# Patient Record
Sex: MALE | Race: WHITE | NOT HISPANIC OR LATINO | Employment: OTHER | URBAN - METROPOLITAN AREA
[De-identification: names, ages, dates, MRNs, and addresses within clinical notes are randomized per-mention and may not be internally consistent; named-entity substitution may affect disease eponyms.]

---

## 2020-02-17 ENCOUNTER — OFFICE VISIT - HEALTHEAST (OUTPATIENT)
Dept: CARDIOLOGY | Facility: CLINIC | Age: 65
End: 2020-02-17

## 2020-02-17 DIAGNOSIS — R07.9 ACUTE CHEST PAIN: ICD-10-CM

## 2020-02-17 ASSESSMENT — MIFFLIN-ST. JEOR: SCORE: 1693.45

## 2020-02-25 ENCOUNTER — HOSPITAL ENCOUNTER (OUTPATIENT)
Dept: CARDIOLOGY | Facility: CLINIC | Age: 65
Discharge: HOME | End: 2020-02-25
Attending: INTERNAL MEDICINE

## 2020-02-25 DIAGNOSIS — R07.9 ACUTE CHEST PAIN: ICD-10-CM

## 2020-02-25 LAB
CV STRESS CURRENT BP HE: NORMAL
CV STRESS CURRENT HR HE: 102
CV STRESS CURRENT HR HE: 109
CV STRESS CURRENT HR HE: 111
CV STRESS CURRENT HR HE: 113
CV STRESS CURRENT HR HE: 115
CV STRESS CURRENT HR HE: 115
CV STRESS CURRENT HR HE: 117
CV STRESS CURRENT HR HE: 117
CV STRESS CURRENT HR HE: 118
CV STRESS CURRENT HR HE: 119
CV STRESS CURRENT HR HE: 122
CV STRESS CURRENT HR HE: 124
CV STRESS CURRENT HR HE: 125
CV STRESS CURRENT HR HE: 125
CV STRESS CURRENT HR HE: 128
CV STRESS CURRENT HR HE: 130
CV STRESS CURRENT HR HE: 132
CV STRESS CURRENT HR HE: 133
CV STRESS CURRENT HR HE: 138
CV STRESS CURRENT HR HE: 145
CV STRESS CURRENT HR HE: 147
CV STRESS CURRENT HR HE: 157
CV STRESS CURRENT HR HE: 94
CV STRESS DEVIATION TIME HE: NORMAL
CV STRESS ECHO PERCENT HR HE: NORMAL
CV STRESS EXERCISE STAGE HE: NORMAL
CV STRESS FINAL RESTING BP HE: NORMAL
CV STRESS FINAL RESTING HR HE: 113
CV STRESS MAX HR HE: 158
CV STRESS MAX TREADMILL GRADE HE: 14
CV STRESS MAX TREADMILL SPEED HE: 3.4
CV STRESS PEAK DIA BP HE: NORMAL
CV STRESS PEAK SYS BP HE: NORMAL
CV STRESS PHASE HE: NORMAL
CV STRESS PROTOCOL HE: NORMAL
CV STRESS RESTING PT POSITION HE: NORMAL
CV STRESS RESTING PT POSITION HE: NORMAL
CV STRESS ST DEVIATION AMOUNT HE: NORMAL
CV STRESS ST DEVIATION ELEVATION HE: NORMAL
CV STRESS ST EVELATION AMOUNT HE: NORMAL
CV STRESS TEST TYPE HE: NORMAL
CV STRESS TOTAL STAGE TIME MIN 1 HE: NORMAL
RATE PRESSURE PRODUCT: NORMAL
STRESS ECHO BASELINE DIASTOLIC HE: 80
STRESS ECHO BASELINE HR: 88
STRESS ECHO BASELINE SYSTOLIC BP: 126
STRESS ECHO CALCULATED PERCENT HR: 102 %
STRESS ECHO LAST STRESS DIASTOLIC BP: 82
STRESS ECHO LAST STRESS HR: 157
STRESS ECHO LAST STRESS SYSTOLIC BP: 180
STRESS ECHO POST ESTIMATED WORKLOAD: 7.9
STRESS ECHO POST EXERCISE DUR MIN: 6
STRESS ECHO POST EXERCISE DUR SEC: 30
STRESS ECHO TARGET HR: 155

## 2020-02-26 ENCOUNTER — COMMUNICATION - HEALTHEAST (OUTPATIENT)
Dept: CARDIOLOGY | Facility: CLINIC | Age: 65
End: 2020-02-26

## 2020-02-26 DIAGNOSIS — R94.39 ABNORMAL STRESS TEST: ICD-10-CM

## 2020-02-26 DIAGNOSIS — R07.9 ACUTE CHEST PAIN: ICD-10-CM

## 2020-03-03 ENCOUNTER — HOSPITAL ENCOUNTER (OUTPATIENT)
Dept: CT IMAGING | Facility: CLINIC | Age: 65
Discharge: HOME | End: 2020-03-03
Attending: INTERNAL MEDICINE

## 2020-03-03 DIAGNOSIS — R07.9 ACUTE CHEST PAIN: ICD-10-CM

## 2020-03-03 DIAGNOSIS — R94.39 ABNORMAL STRESS TEST: ICD-10-CM

## 2020-03-03 LAB
BSA FOR ECHO PROCEDURE: 0 M2
CV CALCIUM SCORE AGATSTON LM: 0
CV CALCIUM SCORING AGATSON LAD: 0
CV CALCIUM SCORING AGATSTON CX: 66
CV CALCIUM SCORING AGATSTON RCA: 251
CV CALCIUM SCORING AGATSTON TOTAL: 317
LEFT VENTRICLE HEART RATE: 71 BPM

## 2020-03-05 ENCOUNTER — COMMUNICATION - HEALTHEAST (OUTPATIENT)
Dept: CARDIOLOGY | Facility: CLINIC | Age: 65
End: 2020-03-05

## 2020-03-05 ENCOUNTER — SURGERY - HEALTHEAST (OUTPATIENT)
Dept: CARDIOLOGY | Facility: CLINIC | Age: 65
End: 2020-03-05

## 2020-03-05 ENCOUNTER — AMBULATORY - HEALTHEAST (OUTPATIENT)
Dept: CARDIOLOGY | Facility: CLINIC | Age: 65
End: 2020-03-05

## 2020-03-05 DIAGNOSIS — I25.110 CORONARY ARTERY DISEASE INVOLVING NATIVE CORONARY ARTERY OF NATIVE HEART WITH UNSTABLE ANGINA PECTORIS (H): ICD-10-CM

## 2020-03-05 DIAGNOSIS — R93.89 ABNORMAL COMPUTED TOMOGRAPHY ANGIOGRAPHY (CTA): ICD-10-CM

## 2020-03-06 ENCOUNTER — COMMUNICATION - HEALTHEAST (OUTPATIENT)
Dept: CARDIOLOGY | Facility: CLINIC | Age: 65
End: 2020-03-06

## 2020-03-06 ENCOUNTER — SURGERY - HEALTHEAST (OUTPATIENT)
Dept: CARDIOLOGY | Facility: CLINIC | Age: 65
End: 2020-03-06

## 2020-03-06 DIAGNOSIS — I25.110 CORONARY ARTERY DISEASE INVOLVING NATIVE CORONARY ARTERY OF NATIVE HEART WITH UNSTABLE ANGINA PECTORIS (H): ICD-10-CM

## 2020-03-06 ASSESSMENT — MIFFLIN-ST. JEOR: SCORE: 1675.3

## 2020-03-07 ASSESSMENT — MIFFLIN-ST. JEOR: SCORE: 1668.04

## 2020-03-20 ENCOUNTER — OFFICE VISIT - HEALTHEAST (OUTPATIENT)
Dept: CARDIOLOGY | Facility: CLINIC | Age: 65
End: 2020-03-20

## 2020-03-20 DIAGNOSIS — E78.5 DYSLIPIDEMIA: ICD-10-CM

## 2020-03-20 DIAGNOSIS — I10 ESSENTIAL HYPERTENSION, BENIGN: ICD-10-CM

## 2020-03-20 DIAGNOSIS — I25.118 CORONARY ARTERY DISEASE OF NATIVE ARTERY OF NATIVE HEART WITH STABLE ANGINA PECTORIS (H): ICD-10-CM

## 2020-03-20 RX ORDER — ROSUVASTATIN CALCIUM 20 MG/1
20 TABLET, COATED ORAL DAILY
Status: ON HOLD | COMMUNITY
Start: 2020-03-17 | End: 2024-01-25

## 2020-03-20 RX ORDER — GABAPENTIN 100 MG/1
100 CAPSULE ORAL 3 TIMES DAILY PRN
Status: SHIPPED | COMMUNITY
Start: 2020-03-17 | End: 2024-02-16

## 2020-04-21 ENCOUNTER — OFFICE VISIT - HEALTHEAST (OUTPATIENT)
Dept: CARDIOLOGY | Facility: CLINIC | Age: 65
End: 2020-04-21

## 2020-04-21 ENCOUNTER — COMMUNICATION - HEALTHEAST (OUTPATIENT)
Dept: CARDIOLOGY | Facility: CLINIC | Age: 65
End: 2020-04-21

## 2020-04-21 DIAGNOSIS — I25.10 CAD (CORONARY ARTERY DISEASE): ICD-10-CM

## 2020-04-21 RX ORDER — CLOPIDOGREL BISULFATE 75 MG/1
75 TABLET ORAL DAILY
Qty: 90 TABLET | Refills: 3 | Status: ON HOLD | OUTPATIENT
Start: 2020-04-21 | End: 2021-07-16

## 2020-06-30 ENCOUNTER — OFFICE VISIT - HEALTHEAST (OUTPATIENT)
Dept: CARDIOLOGY | Facility: CLINIC | Age: 65
End: 2020-06-30

## 2020-06-30 DIAGNOSIS — I25.10 CAD (CORONARY ARTERY DISEASE): ICD-10-CM

## 2020-07-06 ENCOUNTER — COMMUNICATION - HEALTHEAST (OUTPATIENT)
Dept: CARDIOLOGY | Facility: CLINIC | Age: 65
End: 2020-07-06

## 2020-07-06 DIAGNOSIS — I10 ESSENTIAL HYPERTENSION, BENIGN: ICD-10-CM

## 2020-07-06 DIAGNOSIS — I25.118 CORONARY ARTERY DISEASE OF NATIVE ARTERY OF NATIVE HEART WITH STABLE ANGINA PECTORIS (H): ICD-10-CM

## 2020-07-06 DIAGNOSIS — R60.0 LOWER EXTREMITY EDEMA: ICD-10-CM

## 2020-07-17 ENCOUNTER — COMMUNICATION - HEALTHEAST (OUTPATIENT)
Dept: TELEHEALTH | Facility: CLINIC | Age: 65
End: 2020-07-17

## 2020-07-17 ENCOUNTER — HOSPITAL ENCOUNTER (OUTPATIENT)
Dept: CARDIOLOGY | Facility: HOSPITAL | Age: 65
Discharge: HOME | End: 2020-07-17
Attending: INTERNAL MEDICINE

## 2020-07-17 DIAGNOSIS — I10 ESSENTIAL HYPERTENSION, BENIGN: ICD-10-CM

## 2020-07-17 DIAGNOSIS — I25.118 CORONARY ARTERY DISEASE OF NATIVE ARTERY OF NATIVE HEART WITH STABLE ANGINA PECTORIS (H): ICD-10-CM

## 2020-07-17 DIAGNOSIS — R60.0 LOWER EXTREMITY EDEMA: ICD-10-CM

## 2020-07-17 LAB
AORTIC VALVE MEAN VELOCITY: 106 CM/S
ASCENDING AORTA: 3.1 CM
AV DIMENSIONLESS INDEX VTI: 0.8
AV MEAN GRADIENT: 5 MMHG
AV PEAK GRADIENT: 8.2 MMHG
AV VALVE AREA: 3.2 CM2
AV VELOCITY RATIO: 0.8
BSA FOR ECHO PROCEDURE: 2.09 M2
CV BLOOD PRESSURE: NORMAL MMHG
CV ECHO HEIGHT: 68 IN
CV ECHO WEIGHT: 202 LBS
DOP CALC AO PEAK VEL: 143 CM/S
DOP CALC AO VTI: 30.9 CM
DOP CALC LVOT AREA: 3.8 CM2
DOP CALC LVOT DIAMETER: 2.2 CM
DOP CALC LVOT PEAK VEL: 118 CM/S
DOP CALC LVOT STROKE VOLUME: 99.5 CM3
DOP CALCLVOT PEAK VEL VTI: 26.2 CM
EJECTION FRACTION: 50 % (ref 55–75)
FRACTIONAL SHORTENING: 33.6 % (ref 28–44)
INTERVENTRICULAR SEPTUM IN END DIASTOLE: 0.88 CM (ref 0.6–1)
IVS/PW RATIO: 1
LA AREA 1: 17.7 CM2
LA AREA 2: 20.3 CM2
LEFT ATRIUM LENGTH: 5 CM
LEFT ATRIUM SIZE: 3.1 CM
LEFT ATRIUM VOLUME INDEX: 29.2 ML/M2
LEFT ATRIUM VOLUME: 61.1 ML
LEFT VENTRICLE DIASTOLIC VOLUME INDEX: 46.7 CM3/M2 (ref 34–74)
LEFT VENTRICLE DIASTOLIC VOLUME: 97.7 CM3 (ref 62–150)
LEFT VENTRICLE MASS INDEX: 65 G/M2
LEFT VENTRICLE SYSTOLIC VOLUME INDEX: 23.2 CM3/M2 (ref 11–31)
LEFT VENTRICLE SYSTOLIC VOLUME: 48.5 CM3 (ref 21–61)
LEFT VENTRICULAR INTERNAL DIMENSION IN DIASTOLE: 4.64 CM (ref 4.2–5.8)
LEFT VENTRICULAR INTERNAL DIMENSION IN SYSTOLE: 3.08 CM (ref 2.5–4)
LEFT VENTRICULAR MASS: 135.8 G
LEFT VENTRICULAR OUTFLOW TRACT MEAN GRADIENT: 3 MMHG
LEFT VENTRICULAR OUTFLOW TRACT MEAN VELOCITY: 84.5 CM/S
LEFT VENTRICULAR OUTFLOW TRACT PEAK GRADIENT: 6 MMHG
LEFT VENTRICULAR POSTERIOR WALL IN END DIASTOLE: 0.88 CM (ref 0.6–1)
LV STROKE VOLUME INDEX: 47.6 ML/M2
MITRAL VALVE E/A RATIO: 0.8
MV AVERAGE E/E' RATIO: 6.3 CM/S
MV DECELERATION TIME: 261 MS
MV E'TISSUE VEL-LAT: 9.28 CM/S
MV E'TISSUE VEL-MED: 7.83 CM/S
MV LATERAL E/E' RATIO: 5.8
MV MEDIAL E/E' RATIO: 6.8
MV PEAK A VELOCITY: 65.5 CM/S
MV PEAK E VELOCITY: 53.5 CM/S
NUC REST DIASTOLIC VOLUME INDEX: 3232 LBS
NUC REST SYSTOLIC VOLUME INDEX: 68 IN
TRICUSPID VALVE ANULAR PLANE SYSTOLIC EXCURSION: 2.3 CM

## 2020-07-17 ASSESSMENT — MIFFLIN-ST. JEOR: SCORE: 1670.77

## 2020-09-23 ENCOUNTER — COMMUNICATION - HEALTHEAST (OUTPATIENT)
Dept: CARDIOLOGY | Facility: CLINIC | Age: 65
End: 2020-09-23

## 2020-09-23 DIAGNOSIS — I25.110 CORONARY ARTERY DISEASE INVOLVING NATIVE CORONARY ARTERY OF NATIVE HEART WITH UNSTABLE ANGINA PECTORIS (H): ICD-10-CM

## 2020-09-23 RX ORDER — METOPROLOL SUCCINATE 50 MG/1
50 TABLET, EXTENDED RELEASE ORAL DAILY
Qty: 90 TABLET | Refills: 1 | Status: SHIPPED | OUTPATIENT
Start: 2020-09-23

## 2020-10-26 ENCOUNTER — COMMUNICATION - HEALTHEAST (OUTPATIENT)
Dept: CARDIOLOGY | Facility: CLINIC | Age: 65
End: 2020-10-26

## 2020-10-26 DIAGNOSIS — I25.10 CAD (CORONARY ARTERY DISEASE): ICD-10-CM

## 2021-02-19 ENCOUNTER — COMMUNICATION - HEALTHEAST (OUTPATIENT)
Dept: CARDIOLOGY | Facility: CLINIC | Age: 66
End: 2021-02-19

## 2021-02-19 DIAGNOSIS — R07.9 ACUTE CHEST PAIN: ICD-10-CM

## 2021-02-22 RX ORDER — NITROGLYCERIN 0.4 MG/1
TABLET SUBLINGUAL
Qty: 2 BOTTLE | Refills: 1 | Status: SHIPPED | OUTPATIENT
Start: 2021-02-22 | End: 2024-03-12

## 2021-04-23 ENCOUNTER — COMMUNICATION - HEALTHEAST (OUTPATIENT)
Dept: CARDIOLOGY | Facility: CLINIC | Age: 66
End: 2021-04-23

## 2021-04-23 DIAGNOSIS — I25.10 CAD (CORONARY ARTERY DISEASE): ICD-10-CM

## 2021-04-30 ENCOUNTER — COMMUNICATION - HEALTHEAST (OUTPATIENT)
Dept: CARDIOLOGY | Facility: CLINIC | Age: 66
End: 2021-04-30

## 2021-05-03 RX ORDER — NITROGLYCERIN 40 MG/1
PATCH TRANSDERMAL
Qty: 90 PATCH | Refills: 0 | Status: SHIPPED | OUTPATIENT
Start: 2021-05-03 | End: 2022-02-01

## 2021-05-07 ENCOUNTER — COMMUNICATION - HEALTHEAST (OUTPATIENT)
Dept: ADMINISTRATIVE | Facility: CLINIC | Age: 66
End: 2021-05-07

## 2021-05-30 ENCOUNTER — RECORDS - HEALTHEAST (OUTPATIENT)
Dept: ADMINISTRATIVE | Facility: CLINIC | Age: 66
End: 2021-05-30

## 2021-06-04 VITALS — HEIGHT: 68 IN | BODY MASS INDEX: 30.52 KG/M2 | WEIGHT: 201.4 LBS

## 2021-06-04 VITALS
HEIGHT: 68 IN | DIASTOLIC BLOOD PRESSURE: 76 MMHG | RESPIRATION RATE: 16 BRPM | HEART RATE: 78 BPM | BODY MASS INDEX: 31.37 KG/M2 | SYSTOLIC BLOOD PRESSURE: 128 MMHG | WEIGHT: 207 LBS

## 2021-06-04 VITALS
WEIGHT: 202 LBS | BODY MASS INDEX: 30.71 KG/M2 | SYSTOLIC BLOOD PRESSURE: 120 MMHG | HEART RATE: 74 BPM | OXYGEN SATURATION: 98 % | DIASTOLIC BLOOD PRESSURE: 71 MMHG

## 2021-06-04 VITALS — WEIGHT: 202 LBS | HEIGHT: 68 IN | BODY MASS INDEX: 30.62 KG/M2

## 2021-06-06 NOTE — TELEPHONE ENCOUNTER
Bertrand was contacted today and advised of his stress echo results, and the recommended CTA w/ Calcium score test. He is agreement to proceed. Orders are placed. sk/RN

## 2021-06-06 NOTE — TELEPHONE ENCOUNTER
----- Message from Ana Choi MD sent at 3/4/2020  9:49 AM CST -----  Baljit Cheatham;  Can you contact Kitty Philip and update him regarding the test results? Can you also have him scheduled for a coronary angiogram +/- PCI?    Thanks;  ~ Ana

## 2021-06-06 NOTE — TELEPHONE ENCOUNTER
----- Message from Ana Choi MD sent at 2/25/2020  3:11 PM CST -----  Baljit Cheatham;  Can you contact Kitty Philip and update him regarding the test results? Can you please have him get a CTA coronary with calcium score?    Thanks;  ~ Ana

## 2021-06-06 NOTE — PROGRESS NOTES
Bertrand Palacios  [address]  [place] [address]  [phone] (home)     Procedure cardiologist:  Dr. Tony Juárez or Dr. Leanne Chong  PCP:  Mayela Rayo MD  H&P completed by:  Dr. Ana Choi  Admit date 3/6/2020  Arrival time:  0930  Anticoagulation: None  CPAP: No  Previous PCI: yes LAD  Bypass Grafts: No  Renal Issues: No  Diabetic?: No  Device?: No  Type:  n/a  Angiogram Teaching    Reason for Visit:   Telephone call to discuss pre-procedure education in preparation for: Coronary Angiogram with possible Percutaneous Coronary Intervention    Procedure Prep:  Primary Cardiologist note dated: Ana Choi MD 2/17/2020  EKG results obtained, dated: 2/28/2020  Hemogram results obtained: 2/28/2020  Basic Metabolic Panel results obtained: 2/28/2020  Lipid Profile results obtained: 11/19/2019    Patient Education  Explained indications for diagnostic evaluation, including abnormal CTA w/ calcium score, which correlates with his symptoms. He was provided with education including preparation instructions, pre-medication recommendations and expectations for the day of procedure. Patient states understanding of procedure and agrees to proceed.    Pre-procedure instructions  Patient instructed to be NPO after midnight.  Patient instructed to shower the evening before or the morning of the procedure.  Patient instructed to arrange for transportation home following procedure from a responsible family member of friend. No driving for at least 24 hours.  Patient instructed to have a responsible adult with them for 24 hours post-procedure.  Post-procedure follow up process.  Conscious sedation discussed.    Pre-procedure medication instructions  Patient instructed on antiplatelet medication.  Continue medications as scheduled, with a small amount of water on the day of the procedure unless indicated.  Patient instructed to take 324 mg of Aspirin am of procedure: Yes  Other medication: instructed to only take the  Aspirin, Lisinopril and Omeprazole the  a.m. of the procedure.  No other medications, vitamins of supplements the day of the procedure.     *PATIENTS RECORDS AVAILABLE IN [facility] UNLESS OTHERWISE INDICATED*      Patient Active Problem List   Diagnosis     Acute chest pain     Coronary artery disease involving native coronary artery of native heart with unstable angina pectoris (H)     Essential hypertension, benign       Current Outpatient Medications   Medication Sig Dispense Refill     aspirin 81 MG EC tablet Take 81 mg by mouth daily.       lisinopril (PRINIVIL,ZESTRIL) 2.5 MG tablet Take 2.5 mg by mouth daily.       nitroglycerin (NITROSTAT) 0.4 MG SL tablet Place 0.4 mg under the tongue every 5 (five) minutes as needed for chest pain.       omeprazole (PRILOSEC) 20 MG capsule Take 20 mg by mouth daily before breakfast.       simvastatin (ZOCOR) 20 MG tablet Take 20 mg by mouth at bedtime.       No current facility-administered medications for this visit.        No Known Allergies     Bertrand has a history of STEMI w/ PCI intervention to LAD ~ 5 years ago. He recently underwent CTA w/ Calcium scoring for recent return of symptoms. This now shows imaging of Right Ostial lesion suspicious for high grade lesion prompting angiography.  He is a , and his daughter Stephanie will be here with him for the procedure. He was provided education and expectations for the day of procedure over the phone and emailed written documents for review. They are aware that he will be required to stay overnight should he undergo intervention.     Chaparrita Mathur RN, BSN, CACP

## 2021-06-06 NOTE — TELEPHONE ENCOUNTER
RN cannot approve Refill Request    RN can NOT refill this medication med is not covered by policy/route to provider. Last office visit: Visit date not found Last Physical: Visit date not found Last MTM visit: Visit date not found Last visit same specialty: 2/17/2020 Ana Choi MD.  Next visit within 3 mo: Visit date not found  Next physical within 3 mo: Visit date not found      Lisa Jeffery, [facility] Triage/Med Refill 3/7/2020    Requested Prescriptions   Pending Prescriptions Disp Refills     metoprolol succinate (TOPROL-XL) 50 MG 24 hr tablet [Pharmacy Med Name: METOPROLOL ER SUCCINATE 50MG TABS] 90 tablet 0     Sig: TAKE 1 TABLET(50 MG) BY MOUTH DAILY       There is no refill protocol information for this order        nitroglycerin (NITRODUR) 0.2 mg/hr [Pharmacy Med Name: NITROGLYCERIN 0.2 MG/HR PATCH 30'S] 90 patch 0     Sig: APPLY 1 PATCH EXTERNALLY TO THE SKIN DAILY       There is no refill protocol information for this order

## 2021-06-06 NOTE — TELEPHONE ENCOUNTER
Bertrand was contacted with test results and recommendations for Coronary Angiogram w/ possible PCI Case Request placed. sk/RN

## 2021-06-07 NOTE — TELEPHONE ENCOUNTER
----- Message from Ana Choi MD sent at 4/21/2020  8:52 AM CDT -----  Regarding: Plavix loading  Hi Chaparrita;  I'm switching Mr. Palacios from ticagrelor to clopidogrel.due to dyspnea. I have sent in the new clopidogrel Rx for 75 mg daily, but he will also need a loading dose of 600 mg prior to starting the maintenance dose of 75 mg. Can you please contact him and let him know that I would like him to take 600 mg of clopidogrel (one time only) tomorrow (4/22/20) and that he should start taking 75 mg daily the next day (4/23/20).    Thanks;  ~ Ana

## 2021-06-07 NOTE — PROGRESS NOTES
Review of Systems - History obtained from chart review  General ROS: negative  Psychological ROS: negative  Ophthalmic ROS: negative  ENT ROS: negative  Allergy and Immunology ROS: negative  Hematological and Lymphatic ROS: negative  Endocrine ROS: negative  Respiratory ROS: positive for - cough and shortness of breath  Cardiovascular ROS: positive for - chest pain, dyspnea on exertion, shortness of breath and chest pain, above lt breast, under armpit, by shoulder blade  Gastrointestinal ROS: negative  Genito-Urinary ROS: negative  Musculoskeletal ROS: positive for - joint pain, rt hip   Neurological ROS: positive for- daytime sleepiness  Dermatological ROS: negative

## 2021-06-07 NOTE — TELEPHONE ENCOUNTER
"Bertrand was contacted today with instructions for the \"one time loading dose\" of Clopidogrel 600mg (8tabs) to begin tomorrow with this dose, then follow with 75mg (onetab) daily.   He verbalized understanding and denies questions or concerns at this time. sk/RN  "

## 2021-06-08 ENCOUNTER — OFFICE VISIT - HEALTHEAST (OUTPATIENT)
Dept: CARDIOLOGY | Facility: CLINIC | Age: 66
End: 2021-06-08

## 2021-06-08 DIAGNOSIS — R06.09 DYSPNEA ON EXERTION: ICD-10-CM

## 2021-06-08 ASSESSMENT — MIFFLIN-ST. JEOR: SCORE: 1711.13

## 2021-06-09 NOTE — TELEPHONE ENCOUNTER
"Dr. Choi,     Please review update from patient below> Any new recommendations? -ejb    --------------------------------------------------------  Return call to patient. He is calling to report onset of bilateral LE edema over \"the last couple of days\". Patient notes that the swelling is worse in the right leg than the left. Swelling worsens throughout the day. He does not weigh himself daily as he does not have a scale. Patient denies shortness of breath or other concerning symptoms. Legs are not red, but does report they feel \"tight\". Denies injury.  Patient does admit to some dietary indiscretion over the holiday and may have \"over indulged\" in higher sodium foods.    ----- Message from Katarzyna Meier sent at 7/6/2020  8:54 AM CDT -----      Caller: Patient    Primary cardiologist: Dr. Choi    Detailed reason for call: Patient stated that he has sudden swelling in his ankles and calfs. Please advise    Best phone number: [phone]  Best time to contact: today  Ok to leave a detailed message? yes  Device? No        "

## 2021-06-09 NOTE — TELEPHONE ENCOUNTER
----- Message -----  From: Ana Choi MD  Sent: 7/6/2020  12:07 PM CDT  To: Latrice Herbert RN, LADY Menjivar, can you arrange for Mr. Tabor to have an echo? Also, can you go over HF dietary restrictions with him (less than 2 g sodium and less than 2L fluids in 24 hours )?     Chaparrita can you follow-up with him on Thursday to see how he's doing from a volume standpoint?    Thanks guys;  ~ Ana      Called patient and reviewed response and recommendations per Dr. Choi. Patient verbalized understanding and agreement with plan.   Order for echo placed.   Message sent to scheduling to contact patient to arrange. -michelle

## 2021-06-09 NOTE — TELEPHONE ENCOUNTER
Bertrand was contacted today to check in with him today regarding his fluid status.   He reports that he has purchased a new bathroom scale, but has not instituted daily morning weight tracking at this time. He was encouraged to begin with this, and instructed to do this each morning after using the bathroom, and before dressing, so that we have a baseline measurement.   A weight tracking booklet will be mailed to him for his use.   He states that he has been drinking too much fluid with the heat, so will try to monitor this better with the goal of 2 liters daily.   He states his ankle swelling is improved and currently are back to normal.   He feels overall well.   Encouraged to monitor his sodium intake daily, to make better choices by eliminating the salt shaker, and by also eliminating cured and processed deli meats. Navigate towards fresh and items that are not canned or containing a lot of sodium in the processing.  Become a label reader to best achieve this goal.  He will work on this, but to date has not been good at looking closely at these things.     FYI to Dr. Jimbo moore/RN

## 2021-06-09 NOTE — PROGRESS NOTES
Vitals - Patient Reported  Systolic (Patient Reported): 109  Diastolic (Patient Reported): 57  Weight (Patient Reported): 200 lb (90.7 kg)  Pulse (Patient Reported): 77    Review of Systems - History obtained from the patient  General ROS: negative  Psychological ROS: negative  Ophthalmic ROS: negative  ENT ROS: negative  Hematological and Lymphatic ROS: positive for - easy bleeding and easy bruising  Respiratory ROS: negative  Cardiovascular ROS: negative   Gastrointestinal ROS: positive for - heartburn  Genito-Urinary ROS: negative  Musculoskeletal ROS: negative  Neurological ROS: positive for - daytime sleepiness  Dermatological ROS: negative

## 2021-06-16 PROBLEM — I25.118 CORONARY ARTERY DISEASE OF NATIVE ARTERY OF NATIVE HEART WITH STABLE ANGINA PECTORIS (H): Status: ACTIVE | Noted: 2020-02-29

## 2021-06-16 PROBLEM — I10 ESSENTIAL HYPERTENSION, BENIGN: Status: ACTIVE | Noted: 2020-02-29

## 2021-06-16 PROBLEM — E78.5 DYSLIPIDEMIA: Status: ACTIVE | Noted: 2020-03-20

## 2021-06-21 NOTE — LETTER
Letter by Ana Choi MD at      Author: Ana Choi MD Service: -- Author Type: --    Filed:  Encounter Date: 4/30/2021 Status: (Other)         Bertrand LISBETH Palacios  [address] [place] [address]      April 30, 2021      Dear Bertrand,    This letter is to remind you that you are due for your follow up appointment with Dr. Ana Choi  . To help ensure you are in the best health possible, a regular follow-up with your cardiologist is essential.     Please call our Patient Scheduling Line at [phone] to schedule your appointment at your earliest convenience.  If you have recently scheduled an appointment, please disregard this letter.    We look forward to seeing you again. As always, we are available at the number  above for any questions or concerns you may have.      Sincerely,     The Physicians and Staff of [facility]

## 2021-06-21 NOTE — LETTER
Letter by Ana Choi MD at      Author: Ana Choi MD Service: -- Author Type: --    Filed:  Encounter Date: 5/7/2021 Status: (Other)         Bertrand Palacios  [address] [place] [address]      May 7, 2021      Dear Bertrand,    This letter is to remind you that you will be due for your follow up appointment with Dr. Ana Choi in May, 2021 . To help ensure you are in the best health possible, a regular follow-up with your cardiologist is essential.     Please call our Patient Scheduling Line at [phone] to schedule your appointment at your earliest convenience.  If you have recently scheduled an appointment, please disregard this letter.    We look forward to seeing you again. As always, we are available at the number  above for any questions or concerns you may have.      Sincerely,     The Physicians and Staff of [facility]

## 2021-06-28 NOTE — PROGRESS NOTES
"Progress Notes by Antonella Edmonds CNP at 3/20/2020  9:50 AM     Author: Antonella Edmonds CNP Service: -- Author Type: Nurse Practitioner    Filed: 3/20/2020 10:05 AM Encounter Date: 3/20/2020 Status: Signed    : Antonella Edmonds CNP (Nurse Practitioner)           The patient has been notified of following:     \"This telephone visit will be conducted via a call between you and your physician/provider. We have found that certain health care needs can be provided without the need for a physical exam.  This service lets us provide the care you need with a phone conversation.  If a prescription is necessary we can send it directly to your pharmacy.  If lab work is needed we can place an order for that and you can then stop by our lab to have the test done at a later time. If during the course of the call the physician/provider feels a telephone visit is not appropriate, you will not be charged for this service.\" Verbal consent has been obtained for this service by care team member:         HEART CARE PHONE ENCOUNTER        The patient has chosen to have the visit conducted as a telephone visit, to reduce risk of exposure given the current status of Coronavirus in our community. This telephone visit is being conducted via a call between the patient and physician/provider. Health care needs are being provided without a physical exam.     Assessment/Recommendations   1.  Coronary artery disease: He was hospitalized February 28 to February 29 with chest pain.  Acute coronary syndrome was ruled out.  He had CTA on March 3 which was abnormal.  PCI on March 6, 2020 with drug-eluting stent to proximal LAD and drug-eluting stent to mid LAD.  Dual antiplatelet therapy is being used with aspirin indefinitely and ticagrelor for 1 year.       Risk factor modification and lifestyle management topics were discussed including managing comorbidities, heart healthy diet and exercise.  He is interested in " cardiac rehab but there are no new patients being scheduled until the end of April.  He was encouraged to start walking 5 to 10 minutes daily and increase as tolerated.    2.  Dyslipidemia: Bertrand Palacios is now on high intensity statin therapy with rosuvastatin 20 mg daily.  This was changed from simvastatin.  His LDL is 83.  We discussed a diet low in saturated fat, weight loss, and exercise along with medication for better control of cholesterol.     3.  Hypertension: He states that his blood pressure is typically well controlled.  He does not check blood pressures at home.      Follow Up Plan: Follow up with Dr. Choi on April 21.  I have reviewed the note as documented.  This accurately captures the substance of my conversation with the patient.    Total time of call between patient and provider was 21 minutes   Start Time:9:25   Stop Time:9:46       History of Present Illness/Subjective    Bertrand Palacios is a 64 y.o. male who is being evaluated via a billable telephone visit.  He has a history of coronary artery disease, MI, dyslipidemia, and hypertension.  He was hospitalized February 28 to February 29 with chest pain.  Acute coronary syndrome was ruled out.  He had CTA on March 3 which was abnormal.  PCI on March 6, 2020 with drug-eluting stent to proximal LAD and drug-eluting stent to mid LAD.  Dual antiplatelet therapy is being used with aspirin indefinitely and ticagrelor for 1 year.  Stress echo in February 2020 showed ejection fraction of 55 to 60%.    He initially had some numbness in his fingers and bruising of his right radial puncture site.  He states that both of these symptoms are improving.  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  He has occasional pressure in his chest.  He states that this is less intense than before PCI.  He has a nitro patch which helps.  His primary care provider earlier this week prescribed isosorbide but he had  significant lightheadedness and headache after taking 1 dose.  He stopped taking isosorbide and went back to using Nitropatch.  He notes new, mild shortness of breath since PCI.  This is most likely related to Brilinta.  He states that shortness of breath is not limiting his activity.     He quit smoking about 10 years ago.  He denies any alcohol use for the past 30 years.  He stays active in his woodworking shop at home and is frequently up and down the stairs at home.  He has not started walking.      I have reviewed and updated the patient's Past Medical History, Social History, Family History and Medication List.     Physical Examination not performed given phone encounter Review of Systems                                                Medical History  Surgical History Family History Social History   Past Medical History:   Diagnosis Date   ? Cancer (H)     bladder   ? Coronary artery disease     S/P PCI in 2006 Per H&P   ? Hiatal hernia     Per H&P   ? History of ASCVD     per H&P   ? Hypertension     Past Surgical History:   Procedure Laterality Date   ? BLADDER SURGERY  2008    tumor    ? CARDIAC CATHETERIZATION      2006 Per H&P   ? CV CORONARY ANGIOGRAM N/A 3/6/2020    Procedure: Coronary Angiogram;  Surgeon: Tony Juárez MD;  Location: [facility] Lab;  Service: Cardiology    Family History   Problem Relation Age of Onset   ? Stroke Mother    ? Heart disease Mother    ? Cancer Father    ? No Medical Problems Sister    ? Other Brother 77   ? No Medical Problems Brother    ? No Medical Problems Sister     Social History     Socioeconomic History   ? Marital status:      Spouse name: Not on file   ? Number of children: Not on file   ? Years of education: Not on file   ? Highest education level: Not on file   Occupational History   ? 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 Financial resource strain: Not on file   ? Food insecurity     Worry:  Not on file     Inability: Not on file   ? Transportation needs     Medical: Not on file     Non-medical: Not on file   Tobacco Use   ? Smoking status: Former Smoker     Last attempt to quit: 2008     Years since quittin.3   ? Smokeless tobacco: Never Used   Substance and Sexual Activity   ? Alcohol use: Never     Frequency: Never   ? Drug use: Not Currently   ? Sexual activity: Not Currently   Lifestyle   ? Physical activity     Days per week: Not on file     Minutes per session: Not on file   ? Stress: Not on file   Relationships   ? Social connections     Talks on phone: Not on file     Gets together: Not on file     Attends Baptism service: Not on file     Active member of club or organization: Not on file     Attends meetings of clubs or organizations: Not on file     Relationship status: Not on file   ? Intimate partner violence     Fear of current or ex partner: Not on file     Emotionally abused: Not on file     Physically abused: Not on file     Forced sexual activity: Not on file   Other Topics Concern   ? Not on file   Social History Narrative   ? Not on file          Medications  Allergies   Current Outpatient Medications   Medication Sig Dispense Refill   ? aspirin 81 MG EC tablet Take 81 mg by mouth daily.     ? gabapentin (NEURONTIN) 100 MG capsule TK 1 C PO TID PRN     ? lisinopril (PRINIVIL,ZESTRIL) 2.5 MG tablet Take 2.5 mg by mouth daily.     ? metoprolol succinate (TOPROL-XL) 50 MG 24 hr tablet Take 1 tablet (50 mg total) by mouth daily. 30 tablet 6   ? nitroglycerin (NITRODUR) 0.2 mg/hr SHAJI 1 PA EXT TO THE SKIN D     ? nitroglycerin (NITROSTAT) 0.4 MG SL tablet Place 0.4 mg under the tongue every 5 (five) minutes as needed for chest pain.     ? omeprazole (PRILOSEC) 20 MG capsule Take 20 mg by mouth daily before breakfast.     ? rosuvastatin (CRESTOR) 20 MG tablet TK 1 T PO HS     ? ticagrelor (BRILINTA) 90 mg Tab Take 1 tablet (90 mg total) by mouth 2 (two) times a day. For 12  months 60 tablet 11     No current facility-administered medications for this visit.     Allergies   Allergen Reactions   ? Isosorbide Headache         Lab Results    Chemistry/lipid CBC Cardiac Enzymes/BNP/TSH/INR   Lab Results   Component Value Date    CHOL 149 03/07/2020    HDL 45 03/07/2020    LDLCALC 83 03/07/2020    TRIG 106 03/07/2020    CREATININE 0.77 03/07/2020    BUN 15 03/07/2020    K 4.4 03/07/2020     03/07/2020     (H) 03/07/2020    CO2 19 (L) 03/07/2020    Lab Results   Component Value Date    WBC 8.7 03/06/2020    HGB 17.1 03/06/2020    HCT 51.9 03/06/2020    MCV 87 03/06/2020     03/06/2020    Lab Results   Component Value Date    TROPONINI 0.02 02/29/2020        Antonella Edmonds

## 2021-06-28 NOTE — PROGRESS NOTES
Progress Notes by Ana Choi MD at 2/17/2020  1:50 PM     Author: Ana Choi MD Service: -- Author Type: Physician    Filed: 2/17/2020  2:13 PM Encounter Date: 2/17/2020 Status: Signed    : Ana Choi MD (Physician)             [facility] Note    Thank you, Dr. Vogel, for asking the [facility] Heart Care team to see Bertrand Palacios to evaluate chest pain.    Assessment:    Bertrand Palacios is a 64 y.o. old male with a PMHx significant for HLP (on simvastatin), CAD s/p PCI and multiple stents (on ASA, ACE-I, statin) who presents to Heart Care clinic for follow-up visit s/p initial ED presentation for chest pain. While in the ED, ECG was unremarkable for acute ischemic changes and Troponin I was negative.     ECG: Personally reviewed. normal EKG, normal sinus rhythm, unchanged from previous tracings.        Plan:  - stress echocardiogram    ______________________________________________________________________    Subjective:  CC: Today, denies any current, palpitations, lightheadedness/dizziness, dyspnea. He describes the pain that prompted her ED visit as 2/10, intermittent since Wednesday (2/12/20), with radiation to the left shoulder, arm and neck. He denies any associated symptoms and reports recurrence since his ED visit. He believes that the discomfort is exacerbated by activity, but was not relieved by sublingual nitro. He denies symptoms of orthopnea, PND, fluid retention/edema.       ______________________________________________________________________      Review of Systems:   A complete 10 systems ROS was reviewed  And is negative except what is listed in the HPI.       Problem List:  Chest pain    Medical History:  CAD s/p PCI    Surgical History:  CAD s/p PCI    Social History:  Social History     Socioeconomic History   ? Marital status:      Spouse name: Not on file   ? Number of children: Not on file   ? Years of education: Not  on file   ? Highest education level: Not on file   Occupational History   ? Not on file   Social Needs   ? Financial resource strain: Not on file   ? Food insecurity:     Worry: Not on file     Inability: Not on file   ? Transportation needs:     Medical: Not on file     Non-medical: Not on file   Tobacco Use   ? Smoking status: Not on file   Substance and Sexual Activity   ? Alcohol use: Not on file   ? Drug use: Not on file   ? Sexual activity: Not on file   Lifestyle   ? Physical activity:     Days per week: Not on file     Minutes per session: Not on file   ? Stress: Not on file   Relationships   ? Social connections:     Talks on phone: Not on file     Gets together: Not on file     Attends Mu-ism service: Not on file     Active member of club or organization: Not on file     Attends meetings of clubs or organizations: Not on file     Relationship status: Not on file   ? Intimate partner violence:     Fear of current or ex partner: Not on file     Emotionally abused: Not on file     Physically abused: Not on file     Forced sexual activity: Not on file   Other Topics Concern   ? Not on file   Social History Narrative   ? Not on file           Family History:  No significant family history of cardiovascular disease     Allergies:  No Known Allergies    Medications:  Current Outpatient Medications   Medication Sig Dispense Refill   ? aspirin 81 MG EC tablet Take 81 mg by mouth daily.     ? lisinopril (PRINIVIL,ZESTRIL) 2.5 MG tablet Take 2.5 mg by mouth daily.     ? nitroglycerin (NITROSTAT) 0.4 MG SL tablet Place 0.4 mg under the tongue every 5 (five) minutes as needed for chest pain.     ? omeprazole (PRILOSEC) 20 MG capsule Take 20 mg by mouth daily before breakfast.     ? simvastatin (ZOCOR) 20 MG tablet Take 20 mg by mouth at bedtime.       No current facility-administered medications for this visit.        Objective:   Vital signs:  /76 (Patient Site: Left Arm, Patient Position: Sitting, Cuff Size:  "Adult Regular)   Pulse 78   Resp 16   Ht 5' 8\" (1.727 m)   Wt 207 lb (93.9 kg)   BMI 31.47 kg/m        Physical Exam:    GENERAL APPEARANCE: Alert, cooperative and in no acute distress.   HEENT: No scleral icterus. Oral mucuos membranes pink and moist.   NECK: no JVD. No Hepatojugular reflux. Thyroid not visualized. No lymphadenopathy   CHEST: clear to auscultation   CARDIOVASCULAR: S1, S2 without murmur ,clicks or rubs. Brachial, radial and posterior tibial pulses are intact and symetric. No carotid bruits noted. No edema  ABDOMEN: Nontender. BS+. No bruits.   SKIN: No Xanthelasma   Musculoskeletal: No cyanosis, clubbing or swelling.      Lab Results:  LIPIDS:  No results found for: CHOL  No results found for: HDL  No results found for: LDLCALC  No results found for: TRIG  No components found for: CHOLHDL    BMP:  Lab Results   Component Value Date    CREATININE 0.82 02/13/2020    BUN 15 02/13/2020     02/13/2020    K 4.0 02/13/2020     (H) 02/13/2020    CO2 28 02/13/2020         Mela Choi MD., S  [facility] HE[facility]           "

## 2021-06-29 ENCOUNTER — HOSPITAL ENCOUNTER (OUTPATIENT)
Dept: CARDIOLOGY | Facility: HOSPITAL | Age: 66
Discharge: HOME | End: 2021-06-29
Attending: INTERNAL MEDICINE
Payer: COMMERCIAL

## 2021-06-29 DIAGNOSIS — R06.09 DYSPNEA ON EXERTION: ICD-10-CM

## 2021-06-29 LAB
CV STRESS CURRENT BP HE: NORMAL
CV STRESS CURRENT HR HE: 105
CV STRESS CURRENT HR HE: 109
CV STRESS CURRENT HR HE: 110
CV STRESS CURRENT HR HE: 113
CV STRESS CURRENT HR HE: 118
CV STRESS CURRENT HR HE: 121
CV STRESS CURRENT HR HE: 130
CV STRESS CURRENT HR HE: 77
CV STRESS CURRENT HR HE: 82
CV STRESS CURRENT HR HE: 91
CV STRESS CURRENT HR HE: 92
CV STRESS CURRENT HR HE: 93
CV STRESS CURRENT HR HE: 93
CV STRESS CURRENT HR HE: 94
CV STRESS CURRENT HR HE: 94
CV STRESS CURRENT HR HE: 95
CV STRESS CURRENT HR HE: 98
CV STRESS CURRENT HR HE: 99
CV STRESS DEVIATION TIME HE: NORMAL
CV STRESS ECHO PERCENT HR HE: NORMAL
CV STRESS EXERCISE STAGE HE: NORMAL
CV STRESS FINAL RESTING BP HE: NORMAL
CV STRESS FINAL RESTING HR HE: 94
CV STRESS MAX HR HE: 133
CV STRESS MAX TREADMILL GRADE HE: 12
CV STRESS MAX TREADMILL SPEED HE: 2.5
CV STRESS PEAK DIA BP HE: NORMAL
CV STRESS PEAK SYS BP HE: NORMAL
CV STRESS PHASE HE: NORMAL
CV STRESS PROTOCOL HE: NORMAL
CV STRESS RESTING PT POSITION HE: NORMAL
CV STRESS RESTING PT POSITION HE: NORMAL
CV STRESS ST DEVIATION AMOUNT HE: NORMAL
CV STRESS ST DEVIATION ELEVATION HE: NORMAL
CV STRESS ST EVELATION AMOUNT HE: NORMAL
CV STRESS TEST TYPE HE: NORMAL
CV STRESS TOTAL STAGE TIME MIN 1 HE: NORMAL
RATE PRESSURE PRODUCT: NORMAL
STRESS ECHO BASELINE DIASTOLIC HE: 78
STRESS ECHO BASELINE HR: 75
STRESS ECHO BASELINE SYSTOLIC BP: 118
STRESS ECHO CALCULATED PERCENT HR: 87 %
STRESS ECHO LAST STRESS DIASTOLIC BP: 82
STRESS ECHO LAST STRESS HR: 130
STRESS ECHO LAST STRESS SYSTOLIC BP: 138
STRESS ECHO POST ESTIMATED WORKLOAD: 6.8
STRESS ECHO POST EXERCISE DUR MIN: 5
STRESS ECHO POST EXERCISE DUR SEC: 0
STRESS ECHO TARGET HR: 153

## 2021-06-29 NOTE — PROGRESS NOTES
"Progress Notes by Ana Choi MD at 4/21/2020  8:30 AM     Author: Ana Choi MD Service: -- Author Type: Physician    Filed: 4/21/2020  8:32 AM Encounter Date: 4/21/2020 Status: Signed    : Ana Choi MD (Physician)       The patient has been notified of following:     \"This telephone visit will be conducted via a call between you and your physician/provider. We have found that certain health care needs can be provided without the need for a physical exam.  This service lets us provide the care you need with a phone conversation.  If a prescription is necessary we can send it directly to your pharmacy.  If lab work is needed we can place an order for that and you can then stop by our lab to have the test done at a later time. If during the course of the call the physician/provider feels a telephone visit is not appropriate, you will not be charged for this service.\" Verbal consent has been obtained for this service by care team member:         HEART CARE PHONE ENCOUNTER        The patient has chosen to have the visit conducted as a telephone visit, to reduce risk of exposure given the current status of Coronavirus in our community. This telephone visit is being conducted via a call between the patient and physician/provider. Health care needs are being provided without a physical exam.         Assessment/Recommendations     1. CAD  Assessment / Plan    S/p multiple stents with the most recent being PCI to the LAD on 3/6/20    Patient reports infrequent episodes of shooting chest discomforts that are brief and self-limiting. He describes then as \"light\" and not related to activity or exertion     Continue ASA, lisinopril, metoprolol succinate, rosuvastatin     Today he reports significant dyspnea since starting ticagrelor --> will switch to clopidogrel       2. HLP  Assessment / Plan    Continue rosuvastatin    Follow Up Plan: Follow up in 2 months  I have " reviewed the note as documented.  This accurately captures the substance of my conversation with the patient.    Start Time: 8:15 am  Stop Time: 8:30 am      History of Present Illness/Subjective    Mr. Bertrand Palacios is a 64 y.o. male who is being evaluated via a billable telephone visit.      Mr. Bertrand Palacios is a 64 y.o. male with a PMHx significant for  HLP (on simvastatin), CAD s/p PCI (most recent PCI to the LAD on 3/6/20) and multiple stents (on ASA, ACE-I, statin) who presents to Heart Care clinic for follow-up visit.    Today, Mr. Palacios reports infrequent twinges or light shooting pains in his chest that are brief with no exertional component an does not limit his ADLs. He also reports significant dyspnea (without other HF symptoms such as orthopnea and fluid retention) since starting ticagrelor and requests switching to clopidogrel which he had no issues in the past     I have reviewed and updated the patient's Past Medical History, Social History, Family History and Medication List.    ECG: Personally reviewed. normal EKG, normal sinus rhythm, unchanged from previous tracings.    Coronary angiogram:    Prox LAD lesion is 80% stenosed.    Mid LAD lesion is 75% stenosed.    Prox Cx lesion is 35% stenosed.    RPDA lesion is 80% stenosed.    A drug eluting .    A STENT SYNERGY DRUG ELUTING 3.33V45MH [id number] drug eluting stent was successfully placed into proximal LAD.    A STENT SYNERGY DRUG ELUTING 2.83K19MV [id number] drug eluting stent was successfully placed into mid LAD.       Review of Systems:  A complete 10 systems ROS was reviewed  And is negative except what is listed in the HPI.         Medical History  Surgical History Family History Social History   Past Medical History:   Diagnosis Date   ? Cancer (H)     bladder   ? Coronary artery disease     S/P PCI in 2006 Per H&P   ? Hiatal hernia     Per H&P   ? History of ASCVD     per H&P   ? Hypertension     Past Surgical History:    Procedure Laterality Date   ? BLADDER SURGERY  2008    tumor    ? CARDIAC CATHETERIZATION      2006 Per H&P   ? CV CORONARY ANGIOGRAM N/A 3/6/2020    Procedure: Coronary Angiogram;  Surgeon: Tony Juárez MD;  Location: [facility] Cath Lab;  Service: Cardiology    no family history of premature coronary artery disease Social History     Socioeconomic History   ? Marital status:      Spouse name: Not on file   ? Number of children: Not on file   ? Years of education: Not on file   ? Highest education level: Not on file   Occupational History   ? 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 Financial resource strain: Not on file   ? Food insecurity     Worry: Not on file     Inability: Not on file   ? Transportation needs     Medical: Not on file     Non-medical: Not on file   Tobacco Use   ? Smoking status: Former Smoker     Last attempt to quit: 2008     Years since quittin.4   ? Smokeless tobacco: Never Used   Substance and Sexual Activity   ? Alcohol use: Never     Frequency: Never   ? Drug use: Not Currently   ? Sexual activity: Not Currently   Lifestyle   ? Physical activity     Days per week: Not on file     Minutes per session: Not on file   ? Stress: Not on file   Relationships   ? Social connections     Talks on phone: Not on file     Gets together: Not on file     Attends Restorationist service: Not on file     Active member of club or organization: Not on file     Attends meetings of clubs or organizations: Not on file     Relationship status: Not on file   ? Intimate partner violence     Fear of current or ex partner: Not on file     Emotionally abused: Not on file     Physically abused: Not on file     Forced sexual activity: Not on file   Other Topics Concern   ? Not on file   Social History Narrative   ? Not on file           Lab Results    Chemistry/lipid CBC Cardiac Enzymes/BNP/TSH/INR   Lab Results   Component Value Date    CHOL 149  2020    HDL 45 2020    LDLCALC 83 2020    TRIG 106 2020    CREATININE 0.77 2020    BUN 15 2020    K 4.4 2020     2020     (H) 2020    CO2 19 (L) 2020    Lab Results   Component Value Date    WBC 8.7 2020    HGB 17.1 2020    HCT 51.9 2020    MCV 87 2020     2020    Lab Results   Component Value Date    TROPONINI 0.02 2020     Lab Results   Component Value Date    TROPONINI 0.02 2020          Weight:    Wt Readings from Last 3 Encounters:   20 201 lb 6.4 oz (91.4 kg)   20 203 lb 6.4 oz (92.3 kg)   20 207 lb (93.9 kg)       Allergies  Allergies   Allergen Reactions   ? Isosorbide Headache         Surgical History  Past Surgical History:   Procedure Laterality Date   ? BLADDER SURGERY  2008    tumor    ? CARDIAC CATHETERIZATION      2006 Per H&P   ? CV CORONARY ANGIOGRAM N/A 3/6/2020    Procedure: Coronary Angiogram;  Surgeon: Tony Juárez MD;  Location: [facility] Cath Lab;  Service: Cardiology       Social History  Tobacco:   Social History     Tobacco Use   Smoking Status Former Smoker   ? Last attempt to quit: 2008   ? Years since quittin.4   Smokeless Tobacco Never Used    Alcohol:   Social History     Substance and Sexual Activity   Alcohol Use Never   ? Frequency: Never    Illicit Drugs:   Social History     Substance and Sexual Activity   Drug Use Not Currently       Family History  Family History   Problem Relation Age of Onset   ? Stroke Mother    ? Heart disease Mother    ? Cancer Father    ? No Medical Problems Sister    ? Other Brother 77   ? No Medical Problems Brother    ? No Medical Problems Sister           Mela Choi on 2020      cc: Mayela Rayo MD,

## 2021-06-29 NOTE — PROGRESS NOTES
"Progress Notes by Ana Choi MD at 6/30/2020  2:10 PM     Author: Ana Choi MD Service: -- Author Type: Physician    Filed: 6/30/2020  1:23 PM Encounter Date: 6/30/2020 Status: Signed    : Ana Choi MD (Physician)       The patient has been notified of following:     \"This telephone visit will be conducted via a call between you and your physician/provider. We have found that certain health care needs can be provided without the need for a physical exam.  This service lets us provide the care you need with a phone conversation.  If a prescription is necessary we can send it directly to your pharmacy.  If lab work is needed we can place an order for that and you can then stop by our lab to have the test done at a later time. If during the course of the call the physician/provider feels a telephone visit is not appropriate, you will not be charged for this service.\" Verbal consent has been obtained for this service by care team member:         HEART CARE PHONE ENCOUNTER        The patient has chosen to have the visit conducted as a telephone visit, to reduce risk of exposure given the current status of Coronavirus in our community. This telephone visit is being conducted via a call between the patient and physician/provider. Health care needs are being provided without a physical exam.       Assessment/Recommendations     1. CAD  Assessment / Plan    S/p multiple stents with the most recent being PCI to the LAD on 3/6/20    Patient reports infrequent brief episodes of shooting chest discomforts that are brief and self-limiting. He describes then as \"light\" and not related to activity or exertion that may be related to his hiatal hernia (pateint does not feel that it's related his heart)    Continue ASA, lisinopril, metoprolol succinate, rosuvastatin and clopidogrel     2. HLP  Assessment / Plan    Continue rosuvastatin    Follow Up Plan: Follow up in 6 " months  I have reviewed the note as documented.  This accurately captures the substance of my conversation with the patient.     Start Time: 1:15 pm  Stop Time: 1:22 pm       History of Present Illness/Subjective    Mr. Bertrand Palacios is a 64 y.o. male who is being evaluated via a billable telephone visit.       Mr. Bertrand Palacios is a 64 y.o. male with a PMHx significant for  HLP (on simvastatin), CAD s/p PCI (most recent PCI to the LAD on 3/6/20) and multiple stents (on ASA, ACE-I, statin) who presents to Heart Care clinic for follow-up visit.     Today, Mr. Palacios reports infrequent twinges or light shooting pains in his chest that are brief with no exertional component an does not limit his ADLs. He reports that his dyspnea has resolved since stopping ticagrelor and starting clopidogrel. He denies HF symptoms of orthopnea, PND or fluid retention/edema.      I have reviewed and updated the patient's Past Medical History, Social History, Family History and Medication List.     ECG: Personally reviewed. normal EKG, normal sinus rhythm, unchanged from previous tracings.     Coronary angiogram:    Prox LAD lesion is 80% stenosed.    Mid LAD lesion is 75% stenosed.    Prox Cx lesion is 35% stenosed.    RPDA lesion is 80% stenosed.    A drug eluting .    A STENT SYNERGY DRUG ELUTING 3.87G11MX [id number] drug eluting stent was successfully placed into proximal LAD.    A STENT SYNERGY DRUG ELUTING 2.74W95SG [id number] drug eluting stent was successfully placed into mid LAD.       Review of Systems:  A complete 10 systems ROS was reviewed  And is negative except what is listed in the HPI.         Medical History  Surgical History Family History Social History   Past Medical History:   Diagnosis Date   ? Cancer (H)     bladder   ? Coronary artery disease     S/P PCI in 2006 Per H&P   ? Hiatal hernia     Per H&P   ? History of ASCVD     per H&P   ? Hypertension     Past Surgical History:   Procedure Laterality Date    ? BLADDER SURGERY  2008    tumor    ? CARDIAC CATHETERIZATION      2006 Per H&P   ? CV CORONARY ANGIOGRAM N/A 3/6/2020    Procedure: Coronary Angiogram;  Surgeon: Tony Juárez MD;  Location: [facility] Cath Lab;  Service: Cardiology    no family history of premature coronary artery disease Social History     Socioeconomic History   ? Marital status:      Spouse name: Not on file   ? Number of children: Not on file   ? Years of education: Not on file   ? Highest education level: Not on file   Occupational History   ? 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 Financial resource strain: Not on file   ? Food insecurity     Worry: Not on file     Inability: Not on file   ? Transportation needs     Medical: Not on file     Non-medical: Not on file   Tobacco Use   ? Smoking status: Former Smoker     Last attempt to quit: 2008     Years since quittin.6   ? Smokeless tobacco: Never Used   Substance and Sexual Activity   ? Alcohol use: Never     Frequency: Never   ? Drug use: Not Currently   ? Sexual activity: Not Currently   Lifestyle   ? Physical activity     Days per week: Not on file     Minutes per session: Not on file   ? Stress: Not on file   Relationships   ? Social connections     Talks on phone: Not on file     Gets together: Not on file     Attends Yazdanism service: Not on file     Active member of club or organization: Not on file     Attends meetings of clubs or organizations: Not on file     Relationship status: Not on file   ? Intimate partner violence     Fear of current or ex partner: Not on file     Emotionally abused: Not on file     Physically abused: Not on file     Forced sexual activity: Not on file   Other Topics Concern   ? Not on file   Social History Narrative   ? Not on file           Lab Results    Chemistry/lipid CBC Cardiac Enzymes/BNP/TSH/INR   Lab Results   Component Value Date    CHOL 149 2020    HDL 45  2020    LDLCALC 83 2020    TRIG 106 2020    CREATININE 0.77 2020    BUN 15 2020    K 4.4 2020     2020     (H) 2020    CO2 19 (L) 2020    Lab Results   Component Value Date    WBC 8.7 2020    HGB 17.1 2020    HCT 51.9 2020    MCV 87 2020     2020    Lab Results   Component Value Date    TROPONINI 0.02 2020     Lab Results   Component Value Date    TROPONINI 0.02 2020          Weight:    Wt Readings from Last 3 Encounters:   20 202 lb (91.6 kg)   20 201 lb 6.4 oz (91.4 kg)   20 203 lb 6.4 oz (92.3 kg)       Allergies  Allergies   Allergen Reactions   ? Isosorbide Headache         Surgical History  Past Surgical History:   Procedure Laterality Date   ? BLADDER SURGERY  2008    tumor    ? CARDIAC CATHETERIZATION      2006 Per H&P   ? CV CORONARY ANGIOGRAM N/A 3/6/2020    Procedure: Coronary Angiogram;  Surgeon: Tony Juárez MD;  Location: [facility] Cath Lab;  Service: Cardiology       Social History  Tobacco:   Social History     Tobacco Use   Smoking Status Former Smoker   ? Last attempt to quit: 2008   ? Years since quittin.6   Smokeless Tobacco Never Used    Alcohol:   Social History     Substance and Sexual Activity   Alcohol Use Never   ? Frequency: Never    Illicit Drugs:   Social History     Substance and Sexual Activity   Drug Use Not Currently       Family History  Family History   Problem Relation Age of Onset   ? Stroke Mother    ? Heart disease Mother    ? Cancer Father    ? No Medical Problems Sister    ? Other Brother 77   ? No Medical Problems Brother    ? No Medical Problems Sister           Mela Choi on 2020      cc: Mayela Rayo MD,

## 2021-06-30 ENCOUNTER — TELEPHONE (OUTPATIENT)
Facility: CLINIC | Age: 66
End: 2021-06-30

## 2021-06-30 ENCOUNTER — COMMUNICATION - HEALTHEAST (OUTPATIENT)
Dept: CARDIOLOGY | Facility: CLINIC | Age: 66
End: 2021-06-30

## 2021-06-30 DIAGNOSIS — I25.118 CORONARY ARTERY DISEASE OF NATIVE ARTERY OF NATIVE HEART WITH STABLE ANGINA PECTORIS (H): ICD-10-CM

## 2021-06-30 DIAGNOSIS — I25.118 CORONARY ARTERY DISEASE OF NATIVE ARTERY OF NATIVE HEART WITH STABLE ANGINA PECTORIS (H): Primary | ICD-10-CM

## 2021-07-04 NOTE — PROGRESS NOTES
Progress Notes by Ana Choi MD at 6/8/2021  1:10 PM     Author: Ana Choi MD Service: -- Author Type: Physician    Filed: 6/8/2021  1:54 PM Encounter Date: 6/8/2021 Status: Signed    : Ana Choi MD (Physician)         HEART CARE NOTE          Assessment/Recommendations     1. CAD  Assessment / Plan    S/p multiple stents with the most recent being PCI to the LAD on 3/6/20    Patient endorses dyspnea with moderate exertion (chopping wood; walking up an down stairs) which is new --> given CAD hx will proceed with stress echo    Continue ASA, lisinopril, metoprolol succinate, rosuvastatin and clopidogrel     2. HLP  Assessment / Plan    Continue rosuvastatin    History of Present Illness/Subjective      Mr. Bertrand Palacios is a 64 y.o. male with a PMHx significant for  HLP (on simvastatin), CAD s/p PCI (most recent PCI to the LAD on 3/6/20) and multiple stents (on ASA, ACE-I, statin) who presents to [facility] clinic for follow-up visit.     Today, Mr. Palacios endorses progressive dyspnea in exertion which is new over the last year or so. He denies chest pain, palpitations, orthopnea, PND, presyncope/syncope, lightheadedness, dizziness. Management plan as detailed above     ECG: Personally reviewed. normal EKG, normal sinus rhythm, unchanged from previous tracings.     Coronary angiogram:    Prox LAD lesion is 80% stenosed.    Mid LAD lesion is 75% stenosed.    Prox Cx lesion is 35% stenosed.    RPDA lesion is 80% stenosed.    A drug eluting .    A STENT SYNERGY DRUG ELUTING [id number] drug eluting stent was successfully placed into proximal LAD.    A STENT SYNERGY DRUG ELUTING 2.23X16OF [id number] drug eluting stent was successfully placed into mid LAD.  Echo:  1. The left ventricle is normal in size. Left ventricular systolic performance is at the lower limits of normal. The ejection fraction is estimated to be 50%.    2. There is moderate  distal lateral and distal anterior hypokinesis.  3. No significant valvular heart disease is identified on this study.   4. Normal right ventricular size and systolic performance.         Physical Examination Review of Systems   Vitals:    06/08/21 1319   BP: 110/68   Pulse: 80   Resp: 16     Body mass index is 32.23 kg/m .  Wt Readings from Last 3 Encounters:   06/08/21 212 lb (96.2 kg)   07/17/20 (P) 202 lb (91.6 kg)   04/21/20 202 lb (91.6 kg)     General Appearance:   no distress, normal body habitus   ENT/Mouth: membranes moist, no oral lesions or bleeding gums.      EYES:  no scleral icterus, normal conjunctivae   Neck: no carotid bruits or thyromegaly   Chest/Lungs:   lungs are clear to auscultation, no rales or wheezing, equal chest wall expansion    Cardiovascular:   Regular. Normal first and second heart sounds with no murmurs, rubs, or gallops; the carotid, radial and posterior tibial pulses are intact, no JVD or LE edema bilaterally    Abdomen:  no organomegaly, masses, bruits, or tenderness; bowel sounds are present   Extremities: no cyanosis or clubbing   Skin: no xanthelasma, warm.    Neurologic: normal gait, normal  bilateral, no tremors     Psychiatric: alert and oriented x3, calm     A complete 10 systems ROS was reviewed  And is negative except what is listed in the HPI.          Medical History  Surgical History Family History Social History   Past Medical History:   Diagnosis Date   ? Cancer (H)     bladder   ? Coronary artery disease     S/P PCI in 2006 Per H&P   ? Hiatal hernia     Per H&P   ? History of ASCVD     per H&P   ? Hypertension     Past Surgical History:   Procedure Laterality Date   ? BLADDER SURGERY  2008    tumor    ? CARDIAC CATHETERIZATION      2006 Per H&P   ? CV CORONARY ANGIOGRAM N/A 3/6/2020    Procedure: Coronary Angiogram;  Surgeon: Tony Juárez MD;  Location: [facility];  Service: Cardiology    no family history of premature coronary artery disease  Social History     Socioeconomic History   ? Marital status:      Spouse name: Not on file   ? Number of children: Not on file   ? Years of education: Not on file   ? Highest education level: Not on file   Occupational History   ? 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 Financial resource strain: Not on file   ? Food insecurity     Worry: Not on file     Inability: Not on file   ? Transportation needs     Medical: Not on file     Non-medical: Not on file   Tobacco Use   ? Smoking status: Former Smoker     Quit date: 2008     Years since quittin.5   ? Smokeless tobacco: Never Used   Substance and Sexual Activity   ? Alcohol use: Never     Frequency: Never   ? Drug use: Not Currently   ? Sexual activity: Not Currently   Lifestyle   ? Physical activity     Days per week: Not on file     Minutes per session: Not on file   ? Stress: Not on file   Relationships   ? Social connections     Talks on phone: Not on file     Gets together: Not on file     Attends Taoism service: Not on file     Active member of club or organization: Not on file     Attends meetings of clubs or organizations: Not on file     Relationship status: Not on file   ? Intimate partner violence     Fear of current or ex partner: Not on file     Emotionally abused: Not on file     Physically abused: Not on file     Forced sexual activity: Not on file   Other Topics Concern   ? Not on file   Social History Narrative   ? Not on file           Lab Results    Chemistry/lipid CBC Cardiac Enzymes/BNP/TSH/INR   Lab Results   Component Value Date    CHOL 149 2020    HDL 45 2020    LDLCALC 83 2020    TRIG 106 2020    CREATININE 0.77 2020    BUN 15 2020    K 4.4 2020     2020     (H) 2020    CO2 19 (L) 2020    Lab Results   Component Value Date    WBC 8.7 2020    HGB 17.1 2020    HCT 51.9 2020    MCV 87  2020     2020    Lab Results   Component Value Date    TROPONINI 0.02 2020     Lab Results   Component Value Date    TROPONINI 0.02 2020          Weight:    Wt Readings from Last 3 Encounters:   21 212 lb (96.2 kg)   20 (P) 202 lb (91.6 kg)   20 202 lb (91.6 kg)       Allergies  Allergies   Allergen Reactions   ? Ticagrelor Shortness Of Breath   ? Isosorbide Headache         Surgical History  Past Surgical History:   Procedure Laterality Date   ? BLADDER SURGERY  2008    tumor    ? CARDIAC CATHETERIZATION      2006 Per H&P   ? CV CORONARY ANGIOGRAM N/A 3/6/2020    Procedure: Coronary Angiogram;  Surgeon: Tony Juárez MD;  Location: [facility];  Service: Cardiology       Social History  Tobacco:   Social History     Tobacco Use   Smoking Status Former Smoker   ? Quit date: 2008   ? Years since quittin.5   Smokeless Tobacco Never Used    Alcohol:   Social History     Substance and Sexual Activity   Alcohol Use Never   ? Frequency: Never    Illicit Drugs:   Social History     Substance and Sexual Activity   Drug Use Not Currently       Family History  Family History   Problem Relation Age of Onset   ? Stroke Mother    ? Heart disease Mother    ? Cancer Father    ? No Medical Problems Sister    ? Other Brother 77   ? No Medical Problems Brother    ? No Medical Problems Sister           Mela Choi on 2021      cc: Mayela Rayo MD,

## 2021-07-04 NOTE — TELEPHONE ENCOUNTER
Telephone Encounter by Chaparrita Mathur RN at 6/30/2021  9:16 AM     Author: Chaparrita Mathur RN Service: -- Author Type: Registered Nurse    Filed: 6/30/2021 11:03 AM Encounter Date: 6/30/2021 Status: Addendum    : Chaparrita Mathur RN (Registered Nurse)    Related Notes: Original Note by Chaparrita Mathur RN (Registered Nurse) filed at 6/30/2021  9:24 AM     Summary: Abnormal stress echo, Cor angio w/ poss PCI       Bertrand is contacted with results of his Stress Echo and recommendations as per Dr. TATO Fernandez, to proceed with Cor Angio w/ possible PCI to evaluate further. Case request placed.     Chaparrita Mathur RN BSN, CHFN

## 2021-07-04 NOTE — TELEPHONE ENCOUNTER
Telephone Encounter by Chaparrita Mathur RN at 6/30/2021  9:16 AM     Author: Chaparrita Mathur RN Service: -- Author Type: Registered Nurse    Filed: 6/30/2021  9:16 AM Encounter Date: 6/30/2021 Status: Signed    : Chaparrita Mathur RN (Registered Nurse)       ----- Message from Raffaele Fernandez MD sent at 6/30/2021  8:28 AM CDT -----  He has impaired exertional tolerance with ECG and echo evidence for ischemia. This is intermediate risk. Recommend coronary angiogram with possible PCI.  Please review recommendations for taking sublingual nitroglycerine.    NAI

## 2021-07-05 ENCOUNTER — COMMUNICATION - HEALTHEAST (OUTPATIENT)
Dept: CARDIOLOGY | Facility: CLINIC | Age: 66
End: 2021-07-05

## 2021-07-05 NOTE — TELEPHONE ENCOUNTER
Telephone Encounter by Chaparrita Mathur RN at 7/5/2021 12:48 PM     Author: Chaparrita Mathur RN Service: -- Author Type: Registered Nurse    Filed: 7/5/2021 12:53 PM Encounter Date: 7/5/2021 Status: Signed    : Chaparrita Mathur RN (Registered Nurse)       Mr. Palacios was scheduled for a Cor Angio w/ possible PCI on 7/15/21 with Dr. Juárez, per patient preference, as he performed his last procedure. This is not currently viewable on our snapboard, as the Epic conversion will take place prior to the orders being placed for the procedure.    FYI to Dr. Choi    Thank you,  Chaparrita Mathur RN BSN, CHFN

## 2021-07-05 NOTE — TELEPHONE ENCOUNTER
Telephone Encounter by Chaparrita Mathur RN at 7/5/2021 12:48 PM     Author: Chaparrita Mathur RN Service: -- Author Type: Registered Nurse    Filed: 7/5/2021 12:48 PM Encounter Date: 7/5/2021 Status: Signed    : Chaparrita Mathur RN (Registered Nurse)       ----- Message from Raffaele Fernandez MD sent at 6/30/2021  8:28 AM CDT -----  He has impaired exertional tolerance with ECG and echo evidence for ischemia. This is intermediate risk. Recommend coronary angiogram with possible PCI.  Please review recommendations for taking sublingual nitroglycerine.    NAI

## 2021-07-06 VITALS
RESPIRATION RATE: 16 BRPM | DIASTOLIC BLOOD PRESSURE: 68 MMHG | BODY MASS INDEX: 32.13 KG/M2 | HEART RATE: 80 BPM | SYSTOLIC BLOOD PRESSURE: 110 MMHG | WEIGHT: 212 LBS | HEIGHT: 68 IN

## 2021-07-09 DIAGNOSIS — Z11.59 ENCOUNTER FOR SCREENING FOR OTHER VIRAL DISEASES: Primary | ICD-10-CM

## 2021-07-12 ENCOUNTER — RECORDS - HEALTHEAST (OUTPATIENT)
Dept: CARDIOLOGY | Facility: CLINIC | Age: 66
End: 2021-07-12

## 2021-07-12 DIAGNOSIS — Z11.59 ENCOUNTER FOR SCREENING FOR OTHER VIRAL DISEASES: ICD-10-CM

## 2021-07-12 DIAGNOSIS — I25.118 CORONARY ARTERY DISEASE OF NATIVE ARTERY OF NATIVE HEART WITH STABLE ANGINA PECTORIS (H): Primary | ICD-10-CM

## 2021-07-12 PROCEDURE — U0005 INFEC AGEN DETEC AMPLI PROBE: HCPCS

## 2021-07-12 PROCEDURE — U0003 INFECTIOUS AGENT DETECTION BY NUCLEIC ACID (DNA OR RNA); SEVERE ACUTE RESPIRATORY SYNDROME CORONAVIRUS 2 (SARS-COV-2) (CORONAVIRUS DISEASE [COVID-19]), AMPLIFIED PROBE TECHNIQUE, MAKING USE OF HIGH THROUGHPUT TECHNOLOGIES AS DESCRIBED BY CMS-2020-01-R: HCPCS

## 2021-07-12 RX ORDER — SODIUM CHLORIDE 9 MG/ML
INJECTION, SOLUTION INTRAVENOUS CONTINUOUS
Status: CANCELLED | OUTPATIENT
Start: 2021-07-15

## 2021-07-12 RX ORDER — LIDOCAINE 40 MG/G
CREAM TOPICAL
Status: CANCELLED | OUTPATIENT
Start: 2021-07-12

## 2021-07-12 RX ORDER — ASPIRIN 81 MG/1
325 TABLET ORAL DAILY
Status: CANCELLED | OUTPATIENT
Start: 2021-07-12 | End: 2021-07-14

## 2021-07-13 LAB — SARS-COV-2 RNA RESP QL NAA+PROBE: NEGATIVE

## 2021-07-15 ENCOUNTER — HOSPITAL ENCOUNTER (OUTPATIENT)
Facility: HOSPITAL | Age: 66
Discharge: HOME | End: 2021-07-16
Attending: INTERNAL MEDICINE | Admitting: INTERNAL MEDICINE
Payer: COMMERCIAL

## 2021-07-15 DIAGNOSIS — Z98.890 STATUS POST CORONARY ANGIOGRAM: Primary | ICD-10-CM

## 2021-07-15 DIAGNOSIS — I25.118 CORONARY ARTERY DISEASE OF NATIVE ARTERY OF NATIVE HEART WITH STABLE ANGINA PECTORIS (H): ICD-10-CM

## 2021-07-15 LAB
ACT BLD: 211 SECONDS (ref 74–150)
ACT BLD: 255 SECONDS (ref 74–150)
ANION GAP SERPL CALCULATED.3IONS-SCNC: 9 MMOL/L (ref 5–18)
ATRIAL RATE - MUSE: 57 BPM
ATRIAL RATE - MUSE: 61 BPM
BUN SERPL-MCNC: 18 MG/DL (ref 8–22)
CALCIUM SERPL-MCNC: 9.4 MG/DL (ref 8.5–10.5)
CHLORIDE BLD-SCNC: 111 MMOL/L (ref 98–107)
CHOLEST SERPL-MCNC: 126 MG/DL
CO2 SERPL-SCNC: 23 MMOL/L (ref 22–31)
CREAT SERPL-MCNC: 0.83 MG/DL (ref 0.7–1.3)
DIASTOLIC BLOOD PRESSURE - MUSE: NORMAL MMHG
DIASTOLIC BLOOD PRESSURE - MUSE: NORMAL MMHG
ERYTHROCYTE [DISTWIDTH] IN BLOOD BY AUTOMATED COUNT: 13.6 % (ref 10–15)
FASTING STATUS PATIENT QL REPORTED: YES
GFR SERPL CREATININE-BSD FRML MDRD: >90 ML/MIN/1.73M2
GLUCOSE BLD-MCNC: 110 MG/DL (ref 70–125)
HCT VFR BLD AUTO: 50.6 % (ref 40–53)
HDLC SERPL-MCNC: 42 MG/DL
HGB BLD-MCNC: 16.9 G/DL (ref 13.3–17.7)
INTERPRETATION ECG - MUSE: NORMAL
INTERPRETATION ECG - MUSE: NORMAL
LDLC SERPL CALC-MCNC: 57 MG/DL
MCH RBC QN AUTO: 29.4 PG (ref 26.5–33)
MCHC RBC AUTO-ENTMCNC: 33.4 G/DL (ref 31.5–36.5)
MCV RBC AUTO: 88 FL (ref 78–100)
P AXIS - MUSE: 39 DEGREES
P AXIS - MUSE: 53 DEGREES
PLATELET # BLD AUTO: 201 10E3/UL (ref 150–450)
POTASSIUM BLD-SCNC: 4.5 MMOL/L (ref 3.5–5)
PR INTERVAL - MUSE: 186 MS
PR INTERVAL - MUSE: 190 MS
QRS DURATION - MUSE: 86 MS
QRS DURATION - MUSE: 88 MS
QT - MUSE: 396 MS
QT - MUSE: 416 MS
QTC - MUSE: 398 MS
QTC - MUSE: 404 MS
R AXIS - MUSE: -2 DEGREES
R AXIS - MUSE: 3 DEGREES
RBC # BLD AUTO: 5.75 10E6/UL (ref 4.4–5.9)
SODIUM SERPL-SCNC: 143 MMOL/L (ref 136–145)
SYSTOLIC BLOOD PRESSURE - MUSE: NORMAL MMHG
SYSTOLIC BLOOD PRESSURE - MUSE: NORMAL MMHG
T AXIS - MUSE: 16 DEGREES
T AXIS - MUSE: 32 DEGREES
TRIGL SERPL-MCNC: 136 MG/DL
VENTRICULAR RATE- MUSE: 57 BPM
VENTRICULAR RATE- MUSE: 61 BPM
WBC # BLD AUTO: 7.7 10E3/UL (ref 4–11)

## 2021-07-15 PROCEDURE — 255N000002 HC RX 255 OP 636: Performed by: INTERNAL MEDICINE

## 2021-07-15 PROCEDURE — 80061 LIPID PANEL: CPT | Performed by: INTERNAL MEDICINE

## 2021-07-15 PROCEDURE — C1874 STENT, COATED/COV W/DEL SYS: HCPCS | Performed by: INTERNAL MEDICINE

## 2021-07-15 PROCEDURE — C9601 PERC DRUG-EL COR STENT BRAN: HCPCS | Mod: RC

## 2021-07-15 PROCEDURE — 250N000009 HC RX 250: Performed by: INTERNAL MEDICINE

## 2021-07-15 PROCEDURE — 92929 PR PRQ TRLUML CORONARY BM STENT W/ANGIO ADDL ART/BRNCH: CPT | Mod: RC | Performed by: INTERNAL MEDICINE

## 2021-07-15 PROCEDURE — 250N000011 HC RX IP 250 OP 636: Performed by: INTERNAL MEDICINE

## 2021-07-15 PROCEDURE — 272N000001 HC OR GENERAL SUPPLY STERILE: Performed by: INTERNAL MEDICINE

## 2021-07-15 PROCEDURE — C1894 INTRO/SHEATH, NON-LASER: HCPCS | Performed by: INTERNAL MEDICINE

## 2021-07-15 PROCEDURE — 36592 COLLECT BLOOD FROM PICC: CPT | Performed by: INTERNAL MEDICINE

## 2021-07-15 PROCEDURE — 92928 PRQ TCAT PLMT NTRAC ST 1 LES: CPT | Mod: RC | Performed by: INTERNAL MEDICINE

## 2021-07-15 PROCEDURE — 93454 CORONARY ARTERY ANGIO S&I: CPT | Mod: 26 | Performed by: INTERNAL MEDICINE

## 2021-07-15 PROCEDURE — 250N000013 HC RX MED GY IP 250 OP 250 PS 637: Performed by: INTERNAL MEDICINE

## 2021-07-15 PROCEDURE — 80048 BASIC METABOLIC PNL TOTAL CA: CPT | Performed by: INTERNAL MEDICINE

## 2021-07-15 PROCEDURE — C1769 GUIDE WIRE: HCPCS | Performed by: INTERNAL MEDICINE

## 2021-07-15 PROCEDURE — 93005 ELECTROCARDIOGRAM TRACING: CPT

## 2021-07-15 PROCEDURE — C1887 CATHETER, GUIDING: HCPCS | Performed by: INTERNAL MEDICINE

## 2021-07-15 PROCEDURE — C1760 CLOSURE DEV, VASC: HCPCS | Performed by: INTERNAL MEDICINE

## 2021-07-15 PROCEDURE — 85347 COAGULATION TIME ACTIVATED: CPT | Mod: 91

## 2021-07-15 PROCEDURE — 99152 MOD SED SAME PHYS/QHP 5/>YRS: CPT | Performed by: INTERNAL MEDICINE

## 2021-07-15 PROCEDURE — C9601 PERC DRUG-EL COR STENT BRAN: HCPCS | Mod: RC | Performed by: INTERNAL MEDICINE

## 2021-07-15 PROCEDURE — 93454 CORONARY ARTERY ANGIO S&I: CPT | Performed by: INTERNAL MEDICINE

## 2021-07-15 PROCEDURE — C9600 PERC DRUG-EL COR STENT SING: HCPCS | Mod: RC | Performed by: INTERNAL MEDICINE

## 2021-07-15 PROCEDURE — 93010 ELECTROCARDIOGRAM REPORT: CPT | Performed by: INTERNAL MEDICINE

## 2021-07-15 PROCEDURE — 99153 MOD SED SAME PHYS/QHP EA: CPT | Performed by: INTERNAL MEDICINE

## 2021-07-15 PROCEDURE — 250N000013 HC RX MED GY IP 250 OP 250 PS 637: Performed by: NURSE PRACTITIONER

## 2021-07-15 PROCEDURE — 99213 OFFICE O/P EST LOW 20 MIN: CPT | Mod: 24 | Performed by: NURSE PRACTITIONER

## 2021-07-15 PROCEDURE — 85027 COMPLETE CBC AUTOMATED: CPT | Performed by: INTERNAL MEDICINE

## 2021-07-15 PROCEDURE — C1725 CATH, TRANSLUMIN NON-LASER: HCPCS | Performed by: INTERNAL MEDICINE

## 2021-07-15 DEVICE — STENT CORONARY DES SYNERGY XD MR US 3.00X20MM H7493941820300: Type: IMPLANTABLE DEVICE | Status: FUNCTIONAL

## 2021-07-15 DEVICE — STENT CORONARY DES SYNERGY XD MR US 2.25X12MM H7493941812220: Type: IMPLANTABLE DEVICE | Status: FUNCTIONAL

## 2021-07-15 RX ORDER — HYDRALAZINE HYDROCHLORIDE 20 MG/ML
10 INJECTION INTRAMUSCULAR; INTRAVENOUS EVERY 4 HOURS PRN
Status: DISCONTINUED | OUTPATIENT
Start: 2021-07-15 | End: 2021-07-16 | Stop reason: HOSPADM

## 2021-07-15 RX ORDER — NALOXONE HYDROCHLORIDE 0.4 MG/ML
0.4 INJECTION, SOLUTION INTRAMUSCULAR; INTRAVENOUS; SUBCUTANEOUS
Status: ACTIVE | OUTPATIENT
Start: 2021-07-15 | End: 2021-07-15

## 2021-07-15 RX ORDER — NALOXONE HYDROCHLORIDE 0.4 MG/ML
0.4 INJECTION, SOLUTION INTRAMUSCULAR; INTRAVENOUS; SUBCUTANEOUS
Status: DISCONTINUED | OUTPATIENT
Start: 2021-07-15 | End: 2021-07-16 | Stop reason: HOSPADM

## 2021-07-15 RX ORDER — NITROGLYCERIN 0.4 MG/1
0.4 TABLET SUBLINGUAL EVERY 5 MIN PRN
Status: DISCONTINUED | OUTPATIENT
Start: 2021-07-15 | End: 2021-07-16 | Stop reason: HOSPADM

## 2021-07-15 RX ORDER — NALOXONE HYDROCHLORIDE 0.4 MG/ML
0.2 INJECTION, SOLUTION INTRAMUSCULAR; INTRAVENOUS; SUBCUTANEOUS
Status: DISCONTINUED | OUTPATIENT
Start: 2021-07-15 | End: 2021-07-16 | Stop reason: HOSPADM

## 2021-07-15 RX ORDER — ONDANSETRON 2 MG/ML
4 INJECTION INTRAMUSCULAR; INTRAVENOUS EVERY 6 HOURS PRN
Status: DISCONTINUED | OUTPATIENT
Start: 2021-07-15 | End: 2021-07-16 | Stop reason: HOSPADM

## 2021-07-15 RX ORDER — ATROPINE SULFATE 0.1 MG/ML
0.5 INJECTION INTRAVENOUS
Status: ACTIVE | OUTPATIENT
Start: 2021-07-15 | End: 2021-07-15

## 2021-07-15 RX ORDER — DIAZEPAM 5 MG
5 TABLET ORAL ONCE
Status: COMPLETED | OUTPATIENT
Start: 2021-07-15 | End: 2021-07-15

## 2021-07-15 RX ORDER — NALOXONE HYDROCHLORIDE 0.4 MG/ML
0.2 INJECTION, SOLUTION INTRAMUSCULAR; INTRAVENOUS; SUBCUTANEOUS
Status: ACTIVE | OUTPATIENT
Start: 2021-07-15 | End: 2021-07-15

## 2021-07-15 RX ORDER — OXYCODONE HYDROCHLORIDE 5 MG/1
5 TABLET ORAL EVERY 4 HOURS PRN
Status: DISCONTINUED | OUTPATIENT
Start: 2021-07-15 | End: 2021-07-16 | Stop reason: HOSPADM

## 2021-07-15 RX ORDER — OXYCODONE HYDROCHLORIDE 5 MG/1
10 TABLET ORAL EVERY 4 HOURS PRN
Status: DISCONTINUED | OUTPATIENT
Start: 2021-07-15 | End: 2021-07-16 | Stop reason: HOSPADM

## 2021-07-15 RX ORDER — FENTANYL CITRATE 50 UG/ML
25 INJECTION, SOLUTION INTRAMUSCULAR; INTRAVENOUS
Status: DISCONTINUED | OUTPATIENT
Start: 2021-07-15 | End: 2021-07-16 | Stop reason: HOSPADM

## 2021-07-15 RX ORDER — FLUMAZENIL 0.1 MG/ML
0.2 INJECTION, SOLUTION INTRAVENOUS
Status: ACTIVE | OUTPATIENT
Start: 2021-07-15 | End: 2021-07-15

## 2021-07-15 RX ORDER — HEPARIN SODIUM 1000 [USP'U]/ML
INJECTION, SOLUTION INTRAVENOUS; SUBCUTANEOUS
Status: DISCONTINUED | OUTPATIENT
Start: 2021-07-15 | End: 2021-07-15 | Stop reason: HOSPADM

## 2021-07-15 RX ORDER — SODIUM CHLORIDE 9 MG/ML
INJECTION, SOLUTION INTRAVENOUS CONTINUOUS
Status: ACTIVE | OUTPATIENT
Start: 2021-07-15 | End: 2021-07-15

## 2021-07-15 RX ORDER — LIDOCAINE 40 MG/G
CREAM TOPICAL
Status: DISCONTINUED | OUTPATIENT
Start: 2021-07-15 | End: 2021-07-15 | Stop reason: HOSPADM

## 2021-07-15 RX ORDER — CLOPIDOGREL BISULFATE 75 MG/1
75 TABLET ORAL DAILY
Qty: 90 TABLET | Refills: 3 | Status: SHIPPED | OUTPATIENT
Start: 2021-07-16 | End: 2023-03-03

## 2021-07-15 RX ORDER — ASPIRIN 81 MG/1
81 TABLET ORAL DAILY
Status: DISCONTINUED | OUTPATIENT
Start: 2021-07-16 | End: 2021-07-16 | Stop reason: HOSPADM

## 2021-07-15 RX ORDER — SODIUM CHLORIDE 9 MG/ML
25 INJECTION, SOLUTION INTRAVENOUS CONTINUOUS
Status: DISCONTINUED | OUTPATIENT
Start: 2021-07-15 | End: 2021-07-15 | Stop reason: CLARIF

## 2021-07-15 RX ORDER — FENTANYL CITRATE 50 UG/ML
INJECTION, SOLUTION INTRAMUSCULAR; INTRAVENOUS
Status: DISCONTINUED | OUTPATIENT
Start: 2021-07-15 | End: 2021-07-15 | Stop reason: HOSPADM

## 2021-07-15 RX ORDER — SODIUM CHLORIDE 9 MG/ML
INJECTION, SOLUTION INTRAVENOUS CONTINUOUS
Status: DISCONTINUED | OUTPATIENT
Start: 2021-07-15 | End: 2021-07-15 | Stop reason: HOSPADM

## 2021-07-15 RX ORDER — ASPIRIN 81 MG/1
81 TABLET, CHEWABLE ORAL ONCE
Status: DISCONTINUED | OUTPATIENT
Start: 2021-07-15 | End: 2021-07-16 | Stop reason: HOSPADM

## 2021-07-15 RX ORDER — ONDANSETRON 4 MG/1
4 TABLET, ORALLY DISINTEGRATING ORAL EVERY 6 HOURS PRN
Status: DISCONTINUED | OUTPATIENT
Start: 2021-07-15 | End: 2021-07-16 | Stop reason: HOSPADM

## 2021-07-15 RX ORDER — CLOPIDOGREL BISULFATE 75 MG/1
75 TABLET ORAL DAILY
Status: DISCONTINUED | OUTPATIENT
Start: 2021-07-16 | End: 2021-07-16 | Stop reason: HOSPADM

## 2021-07-15 RX ORDER — ACETAMINOPHEN 325 MG/1
650 TABLET ORAL EVERY 4 HOURS PRN
Status: DISCONTINUED | OUTPATIENT
Start: 2021-07-15 | End: 2021-07-16 | Stop reason: HOSPADM

## 2021-07-15 RX ORDER — IODIXANOL 320 MG/ML
INJECTION, SOLUTION INTRAVASCULAR
Status: DISCONTINUED | OUTPATIENT
Start: 2021-07-15 | End: 2021-07-15 | Stop reason: HOSPADM

## 2021-07-15 RX ADMIN — ACETAMINOPHEN 650 MG: 325 TABLET ORAL at 20:20

## 2021-07-15 RX ADMIN — DIAZEPAM 5 MG: 5 TABLET ORAL at 08:26

## 2021-07-15 ASSESSMENT — MIFFLIN-ST. JEOR
SCORE: 1713.39
SCORE: 1720.2

## 2021-07-15 NOTE — Clinical Note
Stent deployed in the middle right coronary artery. Max pressure = 16 pernell. Total duration = 19 seconds. Balloon reinflated a second time: Max pressure = 22 pernell. Total duration = 22 seconds.

## 2021-07-15 NOTE — Clinical Note
Prepped: groin and right radial. Prepped with: ChloraPrep. The patient was draped. .Pre-procedure site marking:Insertion site not predetermined

## 2021-07-15 NOTE — PRE-PROCEDURE
GENERAL PRE-PROCEDURE:   Procedure:  Coronary angiogram with possible PCI  Date/Time:  7/15/2021 6:57 AM    Written consent obtained?: Yes    Risks and benefits: Risks, benefits and alternatives were discussed    Consent given by:  Patient  Patient states understanding of procedure being performed: Yes    Patient's understanding of procedure matches consent: Yes    Procedure consent matches procedure scheduled: Yes    Expected level of sedation:  Moderate  Appropriately NPO:  Yes  ASA Class:  Class 3- Severe systemic disease, definite functional limitations (CAD s/p PCI, HTN, Dyslipidemia)  Mallampati  :  Grade 1- soft palate, uvula, tonsillar pillars, and posterior pharyngeal wall visible  Lungs:  Lungs clear with good breath sounds bilaterally  Heart:  Normal heart sounds and rate  History & Physical reviewed:  Abbreviated history and physical done prior to moderate sedation  Statement of review:  I have reviewed the lab findings, diagnostic data, medications, and the plan for sedation

## 2021-07-15 NOTE — PROGRESS NOTES
Received patient from previous RN around 1430. Patient was kept comfortable during post-procedure stay.VSS. Denies pain. Femoral groin/Radial site remains dry & free from signs of bleeding. Appointments made & included in AVS. Post-op instructions regarding site care given to patient & daughter. Able to ask questions. Verbalized no concerns. Belongings sent with patient. Transferred to  in stable condition.

## 2021-07-15 NOTE — Clinical Note
Stent deployed in the posterior descending artery. Max pressure = 11 pernell. Total duration = 19 seconds. Balloon reinflated a second time: Max pressure = 12 pernell. Total duration = 20 seconds.

## 2021-07-15 NOTE — PLAN OF CARE
Problem: Bleeding (Cardiac Catheterization)  Goal: Absence of Bleeding  7/15/2021 1835 by Rozina Montoya RN  Outcome: Improving  7/15/2021 1832 by Rozina Montoya RN  Outcome: Improving  7/15/2021 1830 by Rozina Montoya RN  Outcome: Improving   Vs stable. Pt denies any chest  pain and SOB.   Left femoral angiogram site is dry, intact and no hematoma noted. Pt has been weakling in the room.

## 2021-07-15 NOTE — PHARMACY-ADMISSION MEDICATION HISTORY
Pharmacy Note - Admission Medication History   ______________________________________________________________________    Prior To Admission (PTA) med list completed and updated in EMR.       Prior to Admission Medications   Prescriptions Last Dose Informant Patient Reported? Taking?   aspirin 81 MG EC tablet 7/15/2021(4 tablets)  Yes Yes   Sig: [ASPIRIN 81 MG EC TABLET] Take 81 mg by mouth daily.   clopidogreL (PLAVIX) 75 mg tablet 7/15/2021  No Yes   Sig: [CLOPIDOGREL (PLAVIX) 75 MG TABLET] Take 1 tablet (75 mg total) by mouth daily.   gabapentin (NEURONTIN) 100 MG capsule More than a month  Yes No   Sig: [GABAPENTIN (NEURONTIN) 100 MG CAPSULE] Take 100 mg by mouth 3 (three) times a day as needed.    lisinopril (PRINIVIL,ZESTRIL) 2.5 MG tablet 7/15/2021  Yes Yes   Sig: [LISINOPRIL (PRINIVIL,ZESTRIL) 2.5 MG TABLET] Take 2.5 mg by mouth daily.   metoprolol succinate (TOPROL-XL) 50 MG 24 hr tablet 7/15/2021  No Yes   Sig: [METOPROLOL SUCCINATE (TOPROL-XL) 50 MG 24 HR TABLET] Take 1 tablet (50 mg total) by mouth daily.   nitroglycerin (NITRODUR) 0.2 mg/hr 7/15/2021  No Yes   Sig: [NITROGLYCERIN (NITRODUR) 0.2 MG/HR] PLACE 1 PATCH ON THE SKIN EVERY DAY AT 8AM   nitroglycerin (NITROSTAT) 0.4 MG SL tablet   No Yes   Sig: [NITROGLYCERIN (NITROSTAT) 0.4 MG SL TABLET] PLACE 1 TABLET UNDER THE TONGUE EVERY 5MIN AS NEEDED FOR CHEST PAIN. IF SYMPTOMS DO NOT RESOLVE IN 5MIN, REPEAT DOSE AND CALL 911   omeprazole (PRILOSEC) 20 MG capsule 7/15/2021  Yes Yes   Sig: Take 20 mg by mouth 2 times daily (before meals)    rosuvastatin (CRESTOR) 20 MG tablet 7/15/2021  Yes Yes   Sig: [ROSUVASTATIN (CRESTOR) 20 MG TABLET] TK 1 T PO HS      Facility-Administered Medications: None       Information source(s): Patient, Patient's pharmacy and Clinic records    Method of interview communication: in-person    Patient was asked about OTC/herbal products specifically.  PTA med list reflects this.    Based on the pharmacist's assessment, the PTA  med list information appears reliable    Allergies were reviewed, assessed, and updated with the patient.      Patient does not use any multi-dose medications prior to admission.     Thank you for the opportunity to participate in the care of this patient.      Francis Styles [facility]     7/15/2021     1:32 PM

## 2021-07-15 NOTE — DISCHARGE INSTRUCTIONS
Interventional Cardiology  Coronary Angiogram/Angioplasty/Stent/Atherectomy Discharge Instructions -   Femoral Approach    The instructions below are to help you understand how to take care of yourself. There is also information about when to call the doctor or emergency services    **Do not stop your aspirin or platelet inhibitor unless directed by your Cardiologist.  These medications help to prevent platelets in your blood from sticking together and forming a clot.  Examples of these medications are:  Ticagrelor (Brilinta), Clopidigrel (Plavix), Prasugrel (Effient)          For the first 72 hours after your procedure:    No driving for 24 hours    Do not lift more than 15 pounds.  If you usually lift 50 pounds or more daily, please contact your cardiologist    Avoid any hard work or tiring activities, this includes, yard work, jogging, biking, sexual activity    During the day get up and walk around every 2 hours    You may return to work after 72 hours if you are feeling well and your job does not involve heavy lifting          Groin Site / Wound Care / Bleeding      You may take off the dressing on your groin the day after your procedure    Keep the area dry and clean    It is ok to shower with regular soap.  Pat dry, do not rub    You do not need to use a bandage    No tub/pool or hot tub for 1 week    If your groin starts to bleed or begins to swell suddenly after leaving the hospital, lie flat and apply firm pressure above the site for 15 minutes.  If bleeding continues, call 9-1-1    Bruising around the groin area is normal.  It may take 2-3 weeks for this to go away.  It is normal for the bruised area to turn green and/or yellow as it is healing.  A small lump may also be present and may last 2-3 months.    Your leg may be sore or stiff for a few days.  You may take Tylenol or a pain medicine recommended by your doctor    If you have a fever over 100.4, that lasts more than one day - call your  cardiologist.      Our Cardiac Rehab staff may visit briefly with you while your in the hospital.  If they miss you, someone will contact you after you are home.  You are encouraged to enroll in an Outpatient Cardiac Rehab Program     ? No elective dental work for 6 weeks after having a stent.     ? No driving for 24 hours      Your Procedural Physician was: Dr. Tony Juárez  the phone number is: [phone]      [facility] [place] [facility]:  [phone]  If you are calling after hours, please listen to the entire voicemail, a live  will answer at the end of the message

## 2021-07-15 NOTE — Clinical Note
Stent deployed in the posterior descending artery. Max pressure = 10 pernell. Total duration = 23 seconds.

## 2021-07-15 NOTE — DISCHARGE SUMMARY
Physician Preliminary Discharge Summary    Primary Care Physician:  No primary care provider on file.    Discharge Provider: Tony Juárez MD     Admission Date: 7/15/2021.   Admission Diagnoses: Past Medical History:  No date: Cancer (H)      Comment:  bladder  No date: Coronary artery disease      Comment:  S/P PCI in 2006 Per H&P  No date: Hiatal hernia      Comment:  Per H&P  No date: History of ASCVD      Comment:  per H&P  No date: Hypertension  Discharge Date and Time: No discharge date for patient encounter.   Disposition: Home  Condition at Discharge: Stable  Code Status: Full Code      Discharge Diagnoses:  CAD    Consult/s: None  Significant Diagnostic Studies: Angiogram: see report.  Surgery: None  Treatments: See medication discharge sheet.    Discharge Medications: See discharge med list    Follow up appointment with Primary Care Physician: No primary care provider on file.  Follow up appointment with Specialist: Jimbo SANDOVAL    Diet: cardiac  Activity: Restricted as per instructions on sheath site care policy.  Restrictions: As per post heart cath protocol.  Wound / drain care: Routine wound care instructions.  Hospital Summary:   Outpatient diagnostic coronary angiogram assessment of patient with previous known history of coronary disease with recent stress echo study with evidence for ischemia.  Study performed through right femoral artery and subsequently closed with VIP Angio-Seal.  Attempt was made from right radial artery access with the radial artery seems somewhat contracted and access was unsuccessful.  Diagnostic angiography revealed to widely patent stents in the LAD.  No other significant left coronary lesion noted.  Right coronary had a long moderate lesion of the proximal segment severe lesion of the ostium of the PDA and a moderate severe mid PDA lesion.  All 3 sites were stented successfully.  Given the multiple stents involved patient will be observed overnight and anticipated  discharge Friday, July 16.  Patient is already on Plavix and this to be maintained for 1 year.    Recommend the patient be arrange for stress nuclear study in 6 months.    Vital Signs in last 24 hours:    Temp:  [98.6  F (37  C)] 98.6  F (37  C)  Pulse:  [63] 63  Resp:  [16] 16  BP: (140)/(75) 140/75  FiO2 (%):  [2 %] 2 %  SpO2:  [95 %] 95 %    Physical Exam:    Pertinent exam findings on day of discharge include sheath site stable at discharge.

## 2021-07-15 NOTE — Clinical Note
Stent deployed in the posterior descending artery. Max pressure = 10 pernell. Total duration = 35 seconds. Balloon reinflated a second time: Max pressure = 14 pernell. Total duration = 35 seconds.

## 2021-07-15 NOTE — H&P
H/P    Assessment/Recommendations   Assessment:      CAD- s/p multiple PCIs; most recent to pLAD and mLAD on 3/6/2020    HTN- controlled/stable    HLP- on statin therapy    Plan:    Coronary angiogram with possible PCI    Risks/benefits/expected outcomes were discussed with the patient who verbalizes understanding and wishes to proceed    Further recommendations per Dr. Juárez       History of Present Illness/Subjective    Mr. Bertrand Palacios is a 66 year old male with history of CAD with multiple PCIs; last to prox and mLAD in 3/2020, Hypertension and Hyperlipidemia who presents for coronary angiogram with possible PCI. He notes symptoms of decreased exercise tolerance and dyspnea on exertion which has been progressive over the past year. Recent stress test demonstrates abnormal findings which are listed below.     He denies chest pain, pressure, palpitations, dizziness, lightheadedness, syncope or near syncope. No cough, orthopnea, PND or significant LE edema.     ECHOCARDIOGRAM EXERCISE STRESS TEST:    Narrative & Impression    When compared to the previous study dated 2/25/2020,    The stress electrocardiogram was abnormal with 1 mm of horizontal ST segment depression in the II, V3, V4 and V5 leads, beginning at 4.26 minutes of stress, and returning to baseline after 1 minutes into the recovery phase.    The patient's exercise tolerance was moderately impaired.    Stress echocardiogram is positive for inducible ischemia.        Physical Examination Review of Systems   There were no vitals taken for this visit.  There is no height or weight on file to calculate BMI.  Wt Readings from Last 3 Encounters:   06/08/21 96.2 kg (212 lb)   04/21/20 91.6 kg (202 lb)   03/07/20 91.4 kg (201 lb 6.4 oz)     No intake or output data in the 24 hours ending 07/15/21 0706  General Appearance:   no distress, normal body habitus   ENT/Mouth: membranes moist, no oral lesions or bleeding gums.      EYES:  no scleral icterus, normal  conjunctivae   Neck: no carotid bruits or thyromegaly   Chest/Lungs:   lungs are clear to auscultation, no rales or wheezing, equal chest wall expansion    Cardiovascular:   Regular. Normal first and second heart sounds with no murmurs, rubs, or gallops; the carotid, radial and posterior tibial pulses are intact, Jugular venous pressure not elevated, no edema     Abdomen:  no organomegaly, masses, bruits, or tenderness; bowel sounds are present   Extremities: no cyanosis or clubbing   Skin: no xanthelasma, warm.    Neurologic: normal  bilateral, no tremors     Psychiatric: alert and oriented x3, calm     General: WNL  Eyes: WNL  Ears/Nose/Throat: WNL  Lungs: See HPI  Heart: See HPI  Stomach: WNL  Bladder: WNL  Muscle/Joints: WNL  Skin: Positive for easy bruising  Nervous System: WNL  Mental Health: WNL  Blood: WNL     Medical History  Surgical History Family History Social History   Past Medical History:   Diagnosis Date     Cancer (H)     bladder     Coronary artery disease     S/P PCI in 2006 Per H&P     Hiatal hernia     Per H&P     History of ASCVD     per H&P     Hypertension     Past Surgical History:   Procedure Laterality Date     BLADDER SURGERY  2008    tumor      CARDIAC CATHETERIZATION      2006 Per H&P     CV CORONARY ANGIOGRAM N/A 3/6/2020    Procedure: Coronary Angiogram;  Surgeon: Tony Juárez MD;  Location: [facility];  Service: Cardiology     IR MISCELLANEOUS PROCEDURE  11/9/2006    Family History   Problem Relation Age of Onset     Cerebrovascular Disease Mother      Heart Disease Mother      Cancer Father      No Known Problems Sister      Other - See Comments Brother 77.00     No Known Problems Brother      No Known Problems Sister     Social History     Socioeconomic History     Marital status:      Spouse name: Not on file     Number of children: Not on file     Years of education: Not on file     Highest education level: Not on file   Occupational History     Not on  file   Tobacco Use     Smoking status: Former Smoker     Quit date: 2008     Years since quittin.6     Smokeless tobacco: Never Used   Substance and Sexual Activity     Alcohol use: Never     Drug use: Not Currently     Sexual activity: Not Currently   Other Topics Concern     Not on file   Social History Narrative     Not on file     Social Determinants of Health     Financial Resource Strain:      Difficulty of Paying Living Expenses:    Food Insecurity:      Worried About Running Out of Food in the Last Year:      Ran Out of Food in the Last Year:    Transportation Needs:      Lack of Transportation (Medical):      Lack of Transportation (Non-Medical):    Physical Activity:      Days of Exercise per Week:      Minutes of Exercise per Session:    Stress:      Feeling of Stress :    Social Connections:      Frequency of Communication with Friends and Family:      Frequency of Social Gatherings with Friends and Family:      Attends Zoroastrianism Services:      Active Member of Clubs or Organizations:      Attends Club or Organization Meetings:      Marital Status:    Intimate Partner Violence:      Fear of Current or Ex-Partner:      Emotionally Abused:      Physically Abused:      Sexually Abused:           Medications  Allergies   No current outpatient medications on file.    Allergies   Allergen Reactions     Ticagrelor Shortness Of Breath     Isosorbide Headache         Lab Results    Chemistry/lipid CBC Cardiac Enzymes/BNP/TSH/INR   Lab Results   Component Value Date    CHOL 149 2020    HDL 45 2020    TRIG 106 2020    BUN 15 2020     2020    CO2 19 (L) 2020    Lab Results   Component Value Date    WBC 8.7 2020    HGB 17.1 2020    HCT 51.9 2020    MCV 87 2020     2020    Lab Results   Component Value Date    TROPONINI 0.02 2020

## 2021-07-16 VITALS
HEIGHT: 68 IN | OXYGEN SATURATION: 95 % | HEART RATE: 71 BPM | WEIGHT: 211.8 LBS | RESPIRATION RATE: 20 BRPM | BODY MASS INDEX: 32.1 KG/M2 | TEMPERATURE: 98.5 F | SYSTOLIC BLOOD PRESSURE: 136 MMHG | DIASTOLIC BLOOD PRESSURE: 70 MMHG

## 2021-07-16 LAB
ANION GAP SERPL CALCULATED.3IONS-SCNC: 6 MMOL/L (ref 5–18)
ATRIAL RATE - MUSE: 81 BPM
BUN SERPL-MCNC: 18 MG/DL (ref 8–22)
CALCIUM SERPL-MCNC: 8.7 MG/DL (ref 8.5–10.5)
CHLORIDE BLD-SCNC: 109 MMOL/L (ref 98–107)
CO2 SERPL-SCNC: 25 MMOL/L (ref 22–31)
CREAT SERPL-MCNC: 0.88 MG/DL (ref 0.7–1.3)
DIASTOLIC BLOOD PRESSURE - MUSE: NORMAL MMHG
GFR SERPL CREATININE-BSD FRML MDRD: 89 ML/MIN/1.73M2
GLUCOSE BLD-MCNC: 111 MG/DL (ref 70–125)
HGB BLD-MCNC: 15.5 G/DL (ref 13.3–17.7)
INTERPRETATION ECG - MUSE: NORMAL
P AXIS - MUSE: 65 DEGREES
POTASSIUM BLD-SCNC: 4.2 MMOL/L (ref 3.5–5)
PR INTERVAL - MUSE: 184 MS
QRS DURATION - MUSE: 86 MS
QT - MUSE: 360 MS
QTC - MUSE: 418 MS
R AXIS - MUSE: -10 DEGREES
SODIUM SERPL-SCNC: 140 MMOL/L (ref 136–145)
SYSTOLIC BLOOD PRESSURE - MUSE: NORMAL MMHG
T AXIS - MUSE: 46 DEGREES
TROPONIN I SERPL-MCNC: 0.05 NG/ML (ref 0–0.29)
VENTRICULAR RATE- MUSE: 81 BPM

## 2021-07-16 PROCEDURE — 99214 OFFICE O/P EST MOD 30 MIN: CPT | Performed by: INTERNAL MEDICINE

## 2021-07-16 PROCEDURE — 84484 ASSAY OF TROPONIN QUANT: CPT | Performed by: INTERNAL MEDICINE

## 2021-07-16 PROCEDURE — 36415 COLL VENOUS BLD VENIPUNCTURE: CPT | Performed by: INTERNAL MEDICINE

## 2021-07-16 PROCEDURE — 85018 HEMOGLOBIN: CPT | Performed by: INTERNAL MEDICINE

## 2021-07-16 PROCEDURE — 93010 ELECTROCARDIOGRAM REPORT: CPT | Performed by: INTERNAL MEDICINE

## 2021-07-16 PROCEDURE — 250N000013 HC RX MED GY IP 250 OP 250 PS 637: Performed by: INTERNAL MEDICINE

## 2021-07-16 PROCEDURE — 80048 BASIC METABOLIC PNL TOTAL CA: CPT | Performed by: INTERNAL MEDICINE

## 2021-07-16 RX ADMIN — ASPIRIN 81 MG: 81 TABLET, COATED ORAL at 08:50

## 2021-07-16 RX ADMIN — CLOPIDOGREL BISULFATE 75 MG: 75 TABLET ORAL at 08:50

## 2021-07-16 ASSESSMENT — MIFFLIN-ST. JEOR: SCORE: 1710.22

## 2021-07-16 NOTE — PROGRESS NOTES
ASSESSMENT:   1. CAD - s/p PCI with DESx1 to prox/mid RCA, DESx1 to PDA-1 and DESx1 to PDA-2.  Previous stents to prox/mid LAD are patent    2. Hypertension  3. Hyperlipidemia       PLAN:   1. Discharge home today - follow up with Villa Carver NP on  and then with Dr. Choi on   2. Aspirin 81 mg daily, indefinitely  3. Plavix 75 mg daily x 12 months  4. Continue statin with Crestor 20 mg daily  5. Continue nitro patch x 30 days, then stop and use prn NTG SL  6. Continue lisinopril and metoprolol for BP control  7. Cardia rehab scheduled for   8. Repeat exercise nuc stress in 6 months per Dr. Juárez      HISTORY OF PRESENT ILLNESS  Mr. Bertrand Palacios is a 66 year old male with history of CAD, HTN and HLD with multiple coronary interventions in the past, with the most recent being PCI to the LAD in 2020.  Patient was having new LE edema and had an exercise stress echo showing impaired exercise tolerance with evidence of ischemia.  He was thus set up for repeat cor angio +/- intervention    PAST 24H EVENTS:  He underwent left heart catheterization resulting in PCI to the prox/mid RCA, PDA-1 and PDA-2 with DESx3.     SUBJECTIVE:   Post-procedure the patient denies chest pain, shortness of breath, or palpitations. Right radial vascular access site is without hematoma, ecchymosis or drainage.  There is mild bruising.  Right femoral site has some moderate bruising and is quite tender to touch or movement.  No hematoma is appreciated.  Reportable signs and symptoms and activity restrictions were discussed with the patient verbalizes understanding. Post PCI teaching was performed which included lifestyle change and risk factor modification. The patient was also educated on Aspirin and Plavix and their role in the treatment of coronary artery disease. Patient was receptive to teaching and denies needs at time of discharge.    TELE:   NSR    EC/15 before and after EKGs were  "personally reviewed and show normal EKG, normal sinus rhythm, unchanged from previous tracings.    PHYSICAL EXAMINATION:   Vitals:    07/15/21 1949 07/16/21 0010 07/16/21 0420 07/16/21 0726   BP: 127/62 113/54 132/62 136/70   BP Location: Right arm Left arm Left arm Right arm   Pulse: 67 64 69 71   Resp: 18 18 18 20   Temp: 98.1  F (36.7  C) 98.2  F (36.8  C) 98  F (36.7  C) 98.5  F (36.9  C)   TempSrc: Oral Oral Oral Oral   SpO2: 93% 92% 91% 95%   Weight: 96.4 kg (212 lb 8 oz)  96.1 kg (211 lb 12.8 oz)    Height: 1.727 m (5' 8\")         General Appearance:   Awake, alert and in no acute distress, normal body habitus   Chest/Lungs:   Normal respiratory effort. Respirations are even and unlabored. Lungs are clear to auscultation, no rales or wheezing. No chest wall tenderness.    Cardiovascular:   Regular. Normal S1, S2 with no murmurs, rubs, or gallops; the carotid, radial, dorsalis pedis and posterior tibial pulses are intact   Abdomen:  soft, non-tender to palpation, non-distended. + bowel sounds.   Extremities: No edema    Skin: Right radial site WNL right femoral site WNL   Neuro/Psych: Alert and oriented x3, calm      MEDICATIONS:   Current Outpatient Medications   Medication Sig Dispense Refill     clopidogrel (PLAVIX) 75 MG tablet Take 1 tablet (75 mg) by mouth daily 90 tablet 3       PAST MEDICAL HISTORY:   Past Medical History:   Diagnosis Date     Cancer (H)     bladder     Coronary artery disease     S/P PCI in 2006 Per H&P     Hiatal hernia     Per H&P     History of ASCVD     per H&P     Hypertension           ALLERGIES:   Allergies   Allergen Reactions     Ticagrelor Shortness Of Breath     Isosorbide Headache       LABS:   Lab Results   Component Value Date    WBC 7.7 07/15/2021    HGB 15.5 07/16/2021    HCT 50.6 07/15/2021    MCV 88 07/15/2021     07/15/2021     Lab Results   Component Value Date     07/16/2021    CO2 25 07/16/2021    BUN 18 07/16/2021     No results found for: BNP  Lab " Results   Component Value Date    TROPONINI 0.05 07/16/2021     Lab Results   Component Value Date    CHOL 126 07/15/2021    CHOL 149 03/07/2020    CHOL 162 03/06/2020     Lab Results   Component Value Date    HDL 42 07/15/2021    HDL 45 03/07/2020    HDL 46 03/06/2020     No components found for: LDLCALC  Lab Results   Component Value Date    TRIG 136 07/15/2021    TRIG 106 03/07/2020    TRIG 133 03/06/2020     No components found for: CHOLHDL

## 2021-07-16 NOTE — PLAN OF CARE
PRIMARY DIAGNOSIS: Femoral Yadi with stent placement  OUTPATIENT/OBSERVATION GOALS TO BE MET BEFORE DISCHARGE:  Femoral Angio status: No Concern  Radial pulse and CMS (circulation, motion, sensation) are WDL: Yes  Bleeding or hematoma present at site: No      Activity restriction education reviewed with patient: Yes. No  Stable vital signs:  Yes  ADLs back to baseline:  Yes  Activity and level of assistance: Ambulating independently.  Interpretation of rhythm per telemetry tech: Normal sinus      Discharge Planner Nurse   Safe discharge environment identified: Yes  Barriers to discharge: No       Entered by: Mireya Blount 07/16/2021 5:42 AM     Please review provider order for any additional goals.   Nurse to notify provider when observation goals have been met and patient is ready for discharge.

## 2021-07-16 NOTE — PLAN OF CARE
PRIMARY DIAGNOSIS: Femoral Angiogram with stent placement  OUTPATIENT/OBSERVATION GOALS TO BE MET BEFORE DISCHARGE:  1. TR band status: NA  2. Radial pulse and CMS (circulation, motion, sensation) are WDL: Yes  3. Bleeding or hematoma present at site:   Pt had femoral access for angio and no bleeding or hematoma at site.  4. Activity restriction education reviewed with patient: Yes. No  5. Stable vital signs:  Yes  6. ADLs back to baseline:  Yes  7. Activity and level of assistance: Ambulating independently.  8. Interpretation of rhythm per telemetry tech: Normal sinus      Discharge Planner Nurse   Safe discharge environment identified: Yes  Barriers to discharge:        NO barrier to discharge.               Entered by: Mireya Blount 07/16/2021 3:17 AM     Please review provider order for any additional goals.   Nurse to notify provider when observation goals have been met and patient is ready for discharge.

## 2021-07-16 NOTE — PLAN OF CARE
PRIMARY DIAGNOSIS: FEMORAL ANGIOGRAM   OUTPATIENT/OBSERVATION GOALS TO BE MET BEFORE DISCHARGE:  Femoral sheath: removed  Radial pulse and CMS (circulation, motion, sensation) are WDL: Yes  Bleeding or hematoma present at site: No      Activity restriction education reviewed with patient: Yes.     Stable vital signs:  Yes  ADLs back to baseline:  Yes  Activity and level of assistance: Ambulating independently.  Interpretation of rhythm per telemetry tech: Normal sinus      Discharge Planner Nurse   Safe discharge environment identified: Yes  Barriers to discharge: No       Entered by: Mireya Blount 07/15/2021 10:52 PM     Please review provider order for any additional goals.   Nurse to notify provider when observation goals have been met and patient is ready for discharge.

## 2021-07-17 ENCOUNTER — HOSPITAL ENCOUNTER (EMERGENCY)
Facility: HOSPITAL | Age: 66
Discharge: HOME | End: 2021-07-17
Attending: EMERGENCY MEDICINE | Admitting: EMERGENCY MEDICINE
Payer: COMMERCIAL

## 2021-07-17 ENCOUNTER — HOSPITAL ENCOUNTER (EMERGENCY)
Dept: RADIOLOGY | Facility: HOSPITAL | Age: 66
End: 2021-07-17
Attending: EMERGENCY MEDICINE
Payer: COMMERCIAL

## 2021-07-17 VITALS
BODY MASS INDEX: 32.61 KG/M2 | RESPIRATION RATE: 17 BRPM | WEIGHT: 215.2 LBS | SYSTOLIC BLOOD PRESSURE: 103 MMHG | DIASTOLIC BLOOD PRESSURE: 56 MMHG | HEIGHT: 68 IN | TEMPERATURE: 97.3 F | HEART RATE: 63 BPM | OXYGEN SATURATION: 93 %

## 2021-07-17 DIAGNOSIS — R07.2 PRECORDIAL PAIN: ICD-10-CM

## 2021-07-17 DIAGNOSIS — R11.2 NAUSEA AND VOMITING, INTRACTABILITY OF VOMITING NOT SPECIFIED, UNSPECIFIED VOMITING TYPE: ICD-10-CM

## 2021-07-17 LAB
ANION GAP SERPL CALCULATED.3IONS-SCNC: 7 MMOL/L (ref 5–18)
BASOPHILS # BLD AUTO: 0 10E3/UL (ref 0–0.2)
BASOPHILS NFR BLD AUTO: 0 %
BUN SERPL-MCNC: 15 MG/DL (ref 8–22)
CALCIUM SERPL-MCNC: 8.7 MG/DL (ref 8.5–10.5)
CHLORIDE BLD-SCNC: 111 MMOL/L (ref 98–107)
CO2 SERPL-SCNC: 22 MMOL/L (ref 22–31)
CREAT SERPL-MCNC: 0.82 MG/DL (ref 0.7–1.3)
D DIMER PPP FEU-MCNC: 0.33 UG/ML FEU (ref 0–0.5)
EOSINOPHIL # BLD AUTO: 0.1 10E3/UL (ref 0–0.7)
EOSINOPHIL NFR BLD AUTO: 1 %
ERYTHROCYTE [DISTWIDTH] IN BLOOD BY AUTOMATED COUNT: 13.4 % (ref 10–15)
GFR SERPL CREATININE-BSD FRML MDRD: >90 ML/MIN/1.73M2
GLUCOSE BLD-MCNC: 137 MG/DL (ref 70–125)
HCT VFR BLD AUTO: 47.1 % (ref 40–53)
HGB BLD-MCNC: 15.7 G/DL (ref 13.3–17.7)
HOLD SPECIMEN: NORMAL
HOLD SPECIMEN: NORMAL
IMM GRANULOCYTES # BLD: 0.1 10E3/UL
IMM GRANULOCYTES NFR BLD: 0 %
LYMPHOCYTES # BLD AUTO: 1.5 10E3/UL (ref 0.8–5.3)
LYMPHOCYTES NFR BLD AUTO: 11 %
MCH RBC QN AUTO: 29.2 PG (ref 26.5–33)
MCHC RBC AUTO-ENTMCNC: 33.3 G/DL (ref 31.5–36.5)
MCV RBC AUTO: 88 FL (ref 78–100)
MONOCYTES # BLD AUTO: 1 10E3/UL (ref 0–1.3)
MONOCYTES NFR BLD AUTO: 7 %
NEUTROPHILS # BLD AUTO: 11.3 10E3/UL (ref 1.6–8.3)
NEUTROPHILS NFR BLD AUTO: 81 %
NRBC # BLD AUTO: 0 10E3/UL
NRBC BLD AUTO-RTO: 0 /100
PLATELET # BLD AUTO: 185 10E3/UL (ref 150–450)
POTASSIUM BLD-SCNC: 4.2 MMOL/L (ref 3.5–5)
RBC # BLD AUTO: 5.37 10E6/UL (ref 4.4–5.9)
SODIUM SERPL-SCNC: 140 MMOL/L (ref 136–145)
TROPONIN I SERPL-MCNC: 0.08 NG/ML (ref 0–0.29)
WBC # BLD AUTO: 13.9 10E3/UL (ref 4–11)

## 2021-07-17 PROCEDURE — 93005 ELECTROCARDIOGRAM TRACING: CPT

## 2021-07-17 PROCEDURE — 258N000003 HC RX IP 258 OP 636: Performed by: EMERGENCY MEDICINE

## 2021-07-17 PROCEDURE — 96374 THER/PROPH/DIAG INJ IV PUSH: CPT

## 2021-07-17 PROCEDURE — 96375 TX/PRO/DX INJ NEW DRUG ADDON: CPT

## 2021-07-17 PROCEDURE — 36415 COLL VENOUS BLD VENIPUNCTURE: CPT | Performed by: EMERGENCY MEDICINE

## 2021-07-17 PROCEDURE — 71045 X-RAY EXAM CHEST 1 VIEW: CPT

## 2021-07-17 PROCEDURE — 99285 EMERGENCY DEPT VISIT HI MDM: CPT | Mod: 25

## 2021-07-17 PROCEDURE — 96361 HYDRATE IV INFUSION ADD-ON: CPT

## 2021-07-17 PROCEDURE — 80048 BASIC METABOLIC PNL TOTAL CA: CPT | Performed by: EMERGENCY MEDICINE

## 2021-07-17 PROCEDURE — 84484 ASSAY OF TROPONIN QUANT: CPT | Performed by: EMERGENCY MEDICINE

## 2021-07-17 PROCEDURE — 85379 FIBRIN DEGRADATION QUANT: CPT | Performed by: EMERGENCY MEDICINE

## 2021-07-17 PROCEDURE — 250N000011 HC RX IP 250 OP 636: Performed by: EMERGENCY MEDICINE

## 2021-07-17 PROCEDURE — 85025 COMPLETE CBC W/AUTO DIFF WBC: CPT | Performed by: EMERGENCY MEDICINE

## 2021-07-17 PROCEDURE — 36592 COLLECT BLOOD FROM PICC: CPT | Performed by: EMERGENCY MEDICINE

## 2021-07-17 PROCEDURE — 93005 ELECTROCARDIOGRAM TRACING: CPT | Performed by: EMERGENCY MEDICINE

## 2021-07-17 RX ORDER — KETOROLAC TROMETHAMINE 15 MG/ML
15 INJECTION, SOLUTION INTRAMUSCULAR; INTRAVENOUS ONCE
Status: COMPLETED | OUTPATIENT
Start: 2021-07-17 | End: 2021-07-17

## 2021-07-17 RX ORDER — ONDANSETRON 2 MG/ML
4 INJECTION INTRAMUSCULAR; INTRAVENOUS ONCE
Status: COMPLETED | OUTPATIENT
Start: 2021-07-17 | End: 2021-07-17

## 2021-07-17 RX ADMIN — ONDANSETRON 4 MG: 2 INJECTION INTRAMUSCULAR; INTRAVENOUS at 01:28

## 2021-07-17 RX ADMIN — SODIUM CHLORIDE 500 ML: 9 INJECTION, SOLUTION INTRAVENOUS at 01:27

## 2021-07-17 RX ADMIN — KETOROLAC TROMETHAMINE 15 MG: 15 INJECTION, SOLUTION INTRAMUSCULAR; INTRAVENOUS at 01:32

## 2021-07-17 ASSESSMENT — ENCOUNTER SYMPTOMS
LIGHT-HEADEDNESS: 1
DIAPHORESIS: 0
NAUSEA: 1
BACK PAIN: 1
VOMITING: 1

## 2021-07-17 ASSESSMENT — MIFFLIN-ST. JEOR: SCORE: 1725.64

## 2021-07-17 ASSESSMENT — HEART SCORE
AGE: 65+
HEART SCORE: 5
TROPONIN: LESS THAN OR EQUAL TO NORMAL LIMIT
RISK FACTORS: >2 RISK FACTORS OR HX OF ATHEROSCLEROTIC DISEASE
HISTORY: MODERATELY SUSPICIOUS
ECG: NORMAL

## 2021-07-17 NOTE — ED TRIAGE NOTES
Pt states he was just released yesterday after having 3 stents placed by dr. Juárez, returns now with cp, n/v, pain is on the right side radiating to right shoulder

## 2021-07-17 NOTE — DISCHARGE INSTRUCTIONS
You may use Maalox or Pepto-Bismol for stomach upset as needed.      The chest pain returns, please return to the emergency department or contact your cardiologist if you have other concerns.

## 2021-07-17 NOTE — ED PROVIDER NOTES
EMERGENCY DEPARTMENT ENCOUNTER      NAME: Bertrand Palacios  AGE: 66 year old male  YOB: 1955  MRN: [id number]  EVALUATION DATE & TIME: 2021  1:00 AM    PCP: Mayela Rayo    ED PROVIDER: Griselda Villasenor M.D.      Chief Complaint   Patient presents with     Chest Pain         FINAL IMPRESSION:  1. Precordial pain    2. Nausea and vomiting, intractability of vomiting not specified, unspecified vomiting type      MEDICAL DECISION MAKIN:12 AM I met with the patient, obtained history, performed an initial exam, and discussed options and plan for diagnostics and treatment here in the ED.   2:45 AM We discussed the plan for discharge and the patient is agreeable. Reviewed supportive cares, symptomatic treatment, outpatient follow up, and reasons to return to the Emergency Department. Patient to be discharged by ED RN.    Pertinent Labs & Imaging studies reviewed. (See chart for details)         Bertrand Palacios is a 66 year oldmale who presents with chest pain.  The pain is pleuritic in rating through the back.  Is located on the right side.  It is associated with nausea and vomiting but no shortness of breath.  The patient vital signs are within normal limits.  His exam is also normal with the exception of the bruising at the right groin from his heart catheterization.  His initial ECG does not show any ST changes or concerning arrhythmias, pericarditis or abnormal intervals.  His lung exam is normal.  His chest wall pain is not reproducible.  He did state that he had a meal at [employer] a small shop and perhaps overindulged.  Differential diagnosis does include pleural effusion, pericarditis, ACS, PE, GERD.  I will do a cardiac work-up and include a D-dimer to evaluate for PE.  Additionally he will get a chest x-ray to evaluate for pleural effusion or other intrathoracic pathology.  No fever or other infectious signs.symptoms.    All of his labs are within normal limits.  His D-dimer was normal.  Chest  x-ray does not show any significant effusion.  Possibly some atelectasis but no pneumonia or other process.  On reevaluation, the patient states that his symptoms are entirely resolved.  After additional discussion with his friend, he does feel that his symptoms are related to dinner tonight.  Given that he is a high risk cardiac patient, I suggested that he stay for a delta troponin and or observation admission.  The patient has declined these options and would instead like to be discharged home.  I did explain specific risk of possible missed cardiac event given his HEART score of 5.     He states he has follow-up with his primary care doctor and cardiology this week and is comfortable assuming this risk. We discussed warning signs and indications to return to the emergency department.  He understands he is warning signs and will return with any concerns.     MEDICATIONS GIVEN IN THE EMERGENCY:  Medications   ketorolac (TORADOL) injection 15 mg (15 mg Intravenous Given 7/17/21 0132)   ondansetron (ZOFRAN) injection 4 mg (4 mg Intravenous Given 7/17/21 0128)   0.9% sodium chloride BOLUS (0 mLs Intravenous Stopped 7/17/21 0215)     =================================================================    HPI    Patient information was obtained from: Patient    Use of : N/A         Bertrand Palacios is a 66 year old male with a history of CAD, bladder cancer, HTN, and cardiac stent placement, who presents for evaluation of chest pain.    Per chart review, the patient was admitted at this hospital from 7/15-7/16/21 for a coronary angiogram.  Angiogram showed the right coronary having a long moderate lesion of the proximal segment severe lesion of the ostium of the PDA and a moderate severe mid PDA lesion.  All 3 sites were stented successfully.  Patient was discharged yesterday still on his Plavix.    The patient reports developing chest pain (R>L) about six hours ago.  He reports the pain radiates into his right  shoulder and back, and intermittently goes down into his right leg.  He does not describe the pain as a ripping or tearing pain, but he does note some sharp pains.  He states the pain is at a 2/10 currently, the mildest it has been since onset.  He does report associated nausea and emesis.  He was discharged earlier in the morning yesterday, 7/16/21 after having three stents placed.  He denies any complications during this, and he was able to eat multiple meals following the procedure without any pain.  He has been taking Tylenol for his post-op pain.  He is on Plavix and a Asprin daily.  His only other complaint is lightheadedness.  Of note, he still does use a Nitroglycerin patch daily.  No diaphoresis or other complaints at this time.       REVIEW OF SYSTEMS   Review of Systems   Constitutional: Negative for diaphoresis.   Cardiovascular: Positive for chest pain.   Gastrointestinal: Positive for nausea and vomiting.   Musculoskeletal: Positive for back pain (from radiating CP).        Positive for right shoulder pain from radiating chest pain    Neurological: Positive for light-headedness.   All other systems reviewed and are negative.     PAST MEDICAL HISTORY:  Past Medical History:   Diagnosis Date     Cancer (H)     bladder     Coronary artery disease     S/P PCI in 2006 Per H&P     Hiatal hernia     Per H&P     History of ASCVD     per H&P     Hypertension        PAST SURGICAL HISTORY:  Past Surgical History:   Procedure Laterality Date     BLADDER SURGERY  2008    tumor      CARDIAC CATHETERIZATION      2006 Per [facility]&P     CV CORONARY ANGIOGRAM N/A 3/6/2020    Procedure: Coronary Angiogram;  Surgeon: Tony Juárez MD;  Location: [facility] Cath Lab;  Service: Cardiology     IR MISCELLANEOUS PROCEDURE  11/9/2006       CURRENT MEDICATIONS:    No current facility-administered medications for this encounter.    Current Outpatient Medications:      aspirin 81 MG EC tablet, [ASPIRIN 81 MG EC TABLET] Take 81 mg  by mouth daily., Disp: , Rfl:      clopidogrel (PLAVIX) 75 MG tablet, Take 1 tablet (75 mg) by mouth daily, Disp: 90 tablet, Rfl: 3     gabapentin (NEURONTIN) 100 MG capsule, [GABAPENTIN (NEURONTIN) 100 MG CAPSULE] Take 100 mg by mouth 3 (three) times a day as needed. , Disp: , Rfl:      lisinopril (PRINIVIL,ZESTRIL) 2.5 MG tablet, [LISINOPRIL (PRINIVIL,ZESTRIL) 2.5 MG TABLET] Take 2.5 mg by mouth daily., Disp: , Rfl:      metoprolol succinate (TOPROL-XL) 50 MG 24 hr tablet, [METOPROLOL SUCCINATE (TOPROL-XL) 50 MG 24 HR TABLET] Take 1 tablet (50 mg total) by mouth daily., Disp: 90 tablet, Rfl: 1     nitroglycerin (NITRODUR) 0.2 mg/hr, [NITROGLYCERIN (NITRODUR) 0.2 MG/HR] PLACE 1 PATCH ON THE SKIN EVERY DAY AT 8AM, Disp: 90 patch, Rfl: 0     nitroglycerin (NITROSTAT) 0.4 MG SL tablet, [NITROGLYCERIN (NITROSTAT) 0.4 MG SL TABLET] PLACE 1 TABLET UNDER THE TONGUE EVERY 5MIN AS NEEDED FOR CHEST PAIN. IF SYMPTOMS DO NOT RESOLVE IN 5MIN, REPEAT DOSE AND CALL 911, Disp: 2 Bottle, Rfl: 1     omeprazole (PRILOSEC) 20 MG capsule, Take 20 mg by mouth 2 times daily (before meals) , Disp: , Rfl:      rosuvastatin (CRESTOR) 20 MG tablet, [ROSUVASTATIN (CRESTOR) 20 MG TABLET] TK 1 T PO HS, Disp: , Rfl:     ALLERGIES:  Allergies   Allergen Reactions     Ticagrelor Shortness Of Breath     Isosorbide Headache       FAMILY HISTORY:  Family History   Problem Relation Age of Onset     Cerebrovascular Disease Mother      Heart Disease Mother      Cancer Father      No Known Problems Sister      Other - See Comments Brother 77.00     No Known Problems Brother      No Known Problems Sister        SOCIAL HISTORY:   Social History     Socioeconomic History     Marital status:      Spouse name: Not on file     Number of children: Not on file     Years of education: Not on file     Highest education level: Not on file   Occupational History     Not on file   Tobacco Use     Smoking status: Former Smoker     Quit date: 11/17/2008      "Years since quittin.6     Smokeless tobacco: Never Used   Substance and Sexual Activity     Alcohol use: Never     Drug use: Not Currently     Sexual activity: Not Currently   Other Topics Concern     Not on file   Social History Narrative     Not on file     Social Determinants of Health     Financial Resource Strain:      Difficulty of Paying Living Expenses:    Food Insecurity:      Worried About Running Out of Food in the Last Year:      Ran Out of Food in the Last Year:    Transportation Needs:      Lack of Transportation (Medical):      Lack of Transportation (Non-Medical):    Physical Activity:      Days of Exercise per Week:      Minutes of Exercise per Session:    Stress:      Feeling of Stress :    Social Connections:      Frequency of Communication with Friends and Family:      Frequency of Social Gatherings with Friends and Family:      Attends Religion Services:      Active Member of Clubs or Organizations:      Attends Club or Organization Meetings:      Marital Status:    Intimate Partner Violence:      Fear of Current or Ex-Partner:      Emotionally Abused:      Physically Abused:      Sexually Abused:            PHYSICAL EXAM:    Vitals: /56   Pulse 63   Temp 97.3  F (36.3  C)   Resp 17   Ht 1.727 m (5' 8\")   Wt 97.6 kg (215 lb 3.2 oz)   SpO2 93%   BMI 32.72 kg/m     General:. Alert and interactive, comfortable appearing.  HENT: Oropharynx without erythema or exudates. MMM.  TMs clear bilaterally.  Eyes: Pupils mid-sized and equally reactive.   Neck: Full AROM.  No midline tenderness to palpation.  Cardiovascular: Regular rate and rhythm. Peripheral pulses 2+ bilaterally.   Chest/Pulmonary: Normal work of breathing. Lung sounds clear and equal throughout, no wheezes or crackles. No chest wall tenderness or deformities. No reproducible chest wall tenderness.  Abdomen: Soft, obese, nondistended. Nontender without guarding or rebound.  Back/Spine: No CVA or midline " tenderness.  Extremities: Normal ROM of all major joints. No lower extremity edema.   Skin: Warm and dry. Normal skin color. Bruising in right groin, no significant hematoma.  Neuro: Speech clear. CNs grossly intact. Moves all extremities appropriately. Strength and sensation grossly intact to all extremities.   Psych: Normal affect/mood, cooperative, memory appropriate.     LAB:  All pertinent labs reviewed and interpreted.  Labs Ordered and Resulted from Time of ED Arrival Up to the Time of Departure from the ED   BASIC METABOLIC PANEL - Abnormal; Notable for the following components:       Result Value    Chloride 111 (*)     Glucose 137 (*)     All other components within normal limits   CBC WITH PLATELETS AND DIFFERENTIAL - Abnormal; Notable for the following components:    WBC Count 13.9 (*)     Absolute Neutrophils 11.3 (*)     Absolute Immature Granulocytes 0.1 (*)     All other components within normal limits   TROPONIN I - Normal   D DIMER QUANTITATIVE - Normal    Narrative:     This D-dimer assay is intended for use in conjunction with a clinical pretest probability assessment model to exclude pulmonary embolism (PE) and deep venous thrombosis (DVT) in outpatients suspected of PE or DVT. The cut-off value is 0.50 ug/mL FEU.   EXTRA BLUE TOP TUBE   EXTRA RED TOP TUBE   CBC WITH PLATELETS & DIFFERENTIAL    Narrative:     The following orders were created for panel order CBC with platelets + differential.  Procedure                               Abnormality         Status                     ---------                               -----------         ------                     CBC with platelets and d...[[id number]]  Abnormal            Final result                 Please view results for these tests on the individual orders.   EXTRA TUBE    Narrative:     The following orders were created for panel order [place] Draw.  Procedure                               Abnormality         Status                      ---------                               -----------         ------                     Extra Blue Top Tube[[id number]]                              Final result               Extra Red Top Tube[[id number]]                               Final result               Extra Green Top (Lithium...[[id number]]                                                 Extra Purple Top Tube[[id number]]                                                         Please view results for these tests on the individual orders.       RADIOLOGY:  XR Chest Port 1 View   Final Result   IMPRESSION: Normal heart size and pulmonary vascularity. No pulmonary infiltrates or significant pleural fluid. Minimal linear atelectasis left lung base. Tortuous and calcified thoracic aorta. Esophageal hiatal hernia. No significant change since prior.          EKG:   Performed at: 1:01 on 17-Jul-2021  Impression: Normal  Rate: 73  Rhythm: NSR  QRS Interval: 84  QTc Interval: 366/403  Comparison: When compared with EKG from 16-Jul-2021, no significant changes found.  I have independently reviewed and interpreted the EKG(s) documented above.     I, Francis Reich, am serving as a scribe to document services personally performed by Dr. Griselda Villasenor  based on my observation and the provider's statements to me. I, Griselda Villasenor MD attest that Francis Reich is acting in a scribe capacity, has observed my performance of the services and has documented them in accordance with my direction.      Griselda Villasenor M.D.  Emergency Medicine  [facility] EMERGENCY DEPARTMENT  [facility] [facility] [address]  [phone]  Dept: [phone]          Griselda Villasenor MD  07/17/21 0312

## 2021-07-20 LAB
ATRIAL RATE - MUSE: 73 BPM
DIASTOLIC BLOOD PRESSURE - MUSE: NORMAL MMHG
INTERPRETATION ECG - MUSE: NORMAL
P AXIS - MUSE: 72 DEGREES
PR INTERVAL - MUSE: 192 MS
QRS DURATION - MUSE: 84 MS
QT - MUSE: 366 MS
QTC - MUSE: 403 MS
R AXIS - MUSE: 44 DEGREES
SYSTOLIC BLOOD PRESSURE - MUSE: NORMAL MMHG
T AXIS - MUSE: 65 DEGREES
VENTRICULAR RATE- MUSE: 73 BPM

## 2021-07-23 ENCOUNTER — HOSPITAL ENCOUNTER (OUTPATIENT)
Dept: CARDIAC REHAB | Facility: HOSPITAL | Age: 66
End: 2021-07-23
Attending: INTERNAL MEDICINE
Payer: COMMERCIAL

## 2021-07-23 DIAGNOSIS — I25.118 CORONARY ARTERY DISEASE OF NATIVE ARTERY OF NATIVE HEART WITH STABLE ANGINA PECTORIS (H): ICD-10-CM

## 2021-07-23 PROCEDURE — 93797 PHYS/QHP OP CAR RHAB WO ECG: CPT | Mod: 59

## 2021-07-23 PROCEDURE — 93798 PHYS/QHP OP CAR RHAB W/ECG: CPT

## 2021-07-28 ENCOUNTER — HOSPITAL ENCOUNTER (OUTPATIENT)
Dept: CARDIAC REHAB | Facility: HOSPITAL | Age: 66
End: 2021-07-28
Attending: INTERNAL MEDICINE
Payer: COMMERCIAL

## 2021-07-28 PROBLEM — R06.09 DYSPNEA ON EXERTION: Status: ACTIVE | Noted: 2021-07-28

## 2021-07-28 PROCEDURE — 93798 PHYS/QHP OP CAR RHAB W/ECG: CPT

## 2021-07-28 NOTE — PROGRESS NOTES
Assessment/Recommendations   Assessment:    1.  Coronary artery disease with s/p PCI to LAD on 3/6/2020:Coronary angiogram on 7/15/2021 showed 60% stenosis in proximal to mid RCA, 80% stenosis in RPDA-1, 70% stenosis in RPDA-2.  Lesions and proximal RCA to mid RCA was successfully treated with a drug-eluting stents with 0% residual post intervention and no complications.  Previous stents in proximal and mid LAD were found patent.    Dyspnea on exertion resolved and energy level improved since recent stent placement.  Cardiac rehab has been on 7/28/2021    Reviewed most recent BMP, Hgb, platelet- stable.  On dual antiplatelet therapy with aspirin 81 indefinitely and clopidogrel 75 mg daily for 12 months.  On as needed nitroglycerin. Also on Nitropatch 0.2 mg/h daily x 30 days.    2.  Dyslipidemia with LDL goal <70/Obesity with a BMI of: Bertrand Palacios is on high intensity statin therapy with rosuvastatin 20 mg daily. Most recent LDL is 57 on 7/15/2021.  Most recent AST/ALT are stable on 2/20/2020.    3.  Hypertension: His blood pressure is 102/66-asymptomatic. Currently on lisinopril and metoprolol succinate.    Plan/Recommendation:  1.We discussed the importance of antiplatelet therapy and talking with his cardiologist prior to stopping these medications for any reason.  We discussed about utilization of as needed nitroglycerin.  Encouraged to seek medical attention if recurrent chest pain or shortness of breath.  Continue DAPT for 12 months and then aspirin 81 mg indefinitely.  Nitro patch for 1 month and then stop.    2.Risk factor modification and lifestyle management topics were discussed including managing comorbidities, weight loss, heart healthy diet, exercise, smoking cessation and stress reduction.  Continue cardiac rehab as scheduled    3. We discussed a diet low in saturated fat, weight loss, and exercise along with medication for better control of cholesterol.  Highly encouraged to participate in  nutrition class in cardiac rehab.  Repeat fasting lipid in 2 months.  Patient prefers walk-in to the main lab.  Order placed.  Instruction given.    4. Continue current hypertension regimen    5. Repeat exercise nuclear stress test in 6 months per Dr. Juárez    Follow up with Dr. Choi as scheduled in August.      History of Present Illness/Subjective    Mr. Bertrand Palacios is a 66 year old male with a past medical history of coronary artery disease with remote history of and most recently PCI in 2006, obesity PCI to LAD in March 2020, dyslipidemia, and hypertension who is seen at [facility] for post coronary intervention follow up.     Patient saw Dr. Choi on June 8 with new onset of dyspnea with moderate  Exertion.  Echocardiogram exercise stress was abnormal with 1 mm of horizontal ST segment depression in lead II, V3, V4, and V5 at 4.26 minutes of stress and returning to baseline after 1 minutes into recovery phase.  Patient's exercise tolerance was moderately impaired.  Stress echocardiogram was positive for inducible myocardial ischemia.    Coronary angiogram on 7/15/2021 showed 60% stenosis in proximal to mid RCA, 80% stenosis in RPDA-1, 70% stenosis in RPDA-2.  Lesions and proximal RCA to mid RCA was successfully treated with a drug-eluting stents with 0% residual post intervention and no complications.  Previous stents in proximal and mid LAD were found patent.    Patient visited ER the day after he got discharged from the hospital due to right-sided chest discomfort.  His EKG was unremarkable.  Troponin was negative.  D-dimer was negative.  Chest x-ray was negative except minimal atelectasis.    Today, Bertrand reports feeling significant improvement in his shortness of breath and energy level since recent stent placement.  He has initiated cardiac rehab.  He has been active and mowing the lawn and has been tolerating with no cardiac symptoms.  Patient is no longer  experiencing right-sided chest discomfort.  Apparently he went out to eat with his daughter and thinks he had food poisoning.  He felt better after he got the antinausea medication in the hospital.  He does not report any bleeding complications.     He denies further fatigue, lightheadedness, shortness of breath, dyspnea on exertion, orthopnea, PND, palpitations, chest pain, abdominal fullness/bloating and lower extremity edema.     Coronary Angiogram on 7/6/21- reviewed:  Conclusion    Prox RCA to Mid RCA lesion is 60% stenosed. Lesion successfully treated with 3.0 drug-eluting stent with wide patency    RPDA-1 lesion is 80% stenosed. Lesion successfully treated with 2.25 drug-eluting stent with wide patency    RPDA-2 lesion is 70% stenosed. Lesion successfully treated with 2.25 drug-eluting stent with wide patency    Previously stents in proximal and mid LAD are widely patent    Attempt to access right radial artery unsuccessful no complication    Right femoral access successful with closure using VIP Angio-Seal device without complication         Recommendations General Recommendations:  - Patient given specific instructions regarding care of arteriotomy site, activity restrictions, signs and symptoms of cardiac or vascular complications and to seek immediate medical evaluation should they occur.   - Arterial sheath removed from the femoral artery with closure device.   - Femoral angiogram identifies arterial sheath placement is suitable for closure device.     Exercise stress echo pm 6/29/21: Reviewed  Narrative & Impression    When compared to the previous study dated 2/25/2020,    The stress electrocardiogram was abnormal with 1 mm of horizontal ST segment depression in the II, V3, V4 and V5 leads, beginning at 4.26 minutes of stress, and returning to baseline after 1 minutes into the recovery phase.    The patient's exercise tolerance was moderately impaired.    Stress echocardiogram is positive for inducible  "ischemia.       Physical Examination Review of Systems   /66 (BP Location: Left arm, Patient Position: Sitting, Cuff Size: Adult Large)   Pulse 72   Resp 16   Ht 1.727 m (5' 8\")   Wt 96.2 kg (212 lb)   BMI 32.23 kg/m    Body mass index is 32.23 kg/m .  Wt Readings from Last 3 Encounters:   07/29/21 96.2 kg (212 lb)   07/17/21 97.6 kg (215 lb 3.2 oz)   07/16/21 96.1 kg (211 lb 12.8 oz)     General Appearance:   no distress, normal body habitus   ENT/Mouth: membranes moist, no oral lesions or bleeding gums.      EYES:  no scleral icterus, normal conjunctivae   Neck: no carotid bruits or thyromegaly   Chest/Lungs:   lungs are clear to auscultation, no rales or wheezing, equal chest wall expansion    Cardiovascular:    Heart rate regular. Normal first and second heart sounds with no murmurs, rubs, or gallops; the carotid, radial and posterior tibial pulses are intact, Jugular venous pressure flat, no edema bilaterally    Abdomen:  no organomegaly, masses, bruits, or tenderness; bowel sounds are present   Extremities  Puncture Site: no cyanosis or clubbing  Right femoral site is soft.  Radial pulses and Pedal pulses intact and symmetrical.  CMS intact.   Skin: no xanthelasma, warm.    Neurologic: normal  bilateral, no tremors     Psychiatric: alert and oriented x3, calm               Negative unless noted in HPI                                         Medical History  Surgical History Family History Social History   Past Medical History:   Diagnosis Date     Cancer (H)     bladder     Coronary artery disease     S/P PCI in 2006 Per H&P     Hiatal hernia     Per H&P     History of ASCVD     per H&P     Hypertension     Past Surgical History:   Procedure Laterality Date     BLADDER SURGERY  2008    tumor      CARDIAC CATHETERIZATION      2006 Per [facility]&P     CV CORONARY ANGIOGRAM N/A 3/6/2020    Procedure: Coronary Angiogram;  Surgeon: Tony Juárez MD;  Location: [facility];  Service: Cardiology "     CV HEART CATHETERIZATION WITH POSSIBLE INTERVENTION N/A 7/15/2021    Procedure: Coronary Angiogram;  Surgeon: Tony Juárez MD;  Location: [facility] CATH LAB CV     CV PCI N/A 7/15/2021    Procedure: PERCUTANEOUS CORONARY INTERVENTION;  Surgeon: Tony Juárez MD;  Location: [facility] CATH LAB CV     IR MISCELLANEOUS PROCEDURE  2006    Family History   Problem Relation Age of Onset     Cerebrovascular Disease Mother      Heart Disease Mother      Cancer Father      No Known Problems Sister      Other - See Comments Brother 77.00     No Known Problems Brother      No Known Problems Sister     Social History     Socioeconomic History     Marital status:      Spouse name: Not on file     Number of children: Not on file     Years of education: Not on file     Highest education level: Not on file   Occupational History     Not on file   Tobacco Use     Smoking status: Former Smoker     Quit date: 2008     Years since quittin.7     Smokeless tobacco: Never Used   Substance and Sexual Activity     Alcohol use: Never     Drug use: Not Currently     Sexual activity: Not Currently   Other Topics Concern     Not on file   Social History Narrative     Not on file     Social Determinants of Health     Financial Resource Strain:      Difficulty of Paying Living Expenses:    Food Insecurity:      Worried About Running Out of Food in the Last Year:      Ran Out of Food in the Last Year:    Transportation Needs:      Lack of Transportation (Medical):      Lack of Transportation (Non-Medical):    Physical Activity:      Days of Exercise per Week:      Minutes of Exercise per Session:    Stress:      Feeling of Stress :    Social Connections:      Frequency of Communication with Friends and Family:      Frequency of Social Gatherings with Friends and Family:      Attends Nondenominational Services:      Active Member of Clubs or Organizations:      Attends Club or Organization Meetings:      Marital Status:     Intimate Partner Violence:      Fear of Current or Ex-Partner:      Emotionally Abused:      Physically Abused:      Sexually Abused:           Medications  Allergies   Current Outpatient Medications   Medication Sig Dispense Refill     aspirin 81 MG EC tablet [ASPIRIN 81 MG EC TABLET] Take 81 mg by mouth daily.       clopidogrel (PLAVIX) 75 MG tablet Take 1 tablet (75 mg) by mouth daily 90 tablet 3     gabapentin (NEURONTIN) 100 MG capsule [GABAPENTIN (NEURONTIN) 100 MG CAPSULE] Take 100 mg by mouth 3 (three) times a day as needed.        lisinopril (PRINIVIL,ZESTRIL) 2.5 MG tablet [LISINOPRIL (PRINIVIL,ZESTRIL) 2.5 MG TABLET] Take 2.5 mg by mouth daily.       metoprolol succinate (TOPROL-XL) 50 MG 24 hr tablet [METOPROLOL SUCCINATE (TOPROL-XL) 50 MG 24 HR TABLET] Take 1 tablet (50 mg total) by mouth daily. 90 tablet 1     nitroglycerin (NITRODUR) 0.2 mg/hr [NITROGLYCERIN (NITRODUR) 0.2 MG/HR] PLACE 1 PATCH ON THE SKIN EVERY DAY AT 8AM 90 patch 0     omeprazole (PRILOSEC) 20 MG capsule Take 20 mg by mouth 2 times daily (before meals)        rosuvastatin (CRESTOR) 20 MG tablet [ROSUVASTATIN (CRESTOR) 20 MG TABLET] TK 1 T PO HS       nitroglycerin (NITROSTAT) 0.4 MG SL tablet [NITROGLYCERIN (NITROSTAT) 0.4 MG SL TABLET] PLACE 1 TABLET UNDER THE TONGUE EVERY 5MIN AS NEEDED FOR CHEST PAIN. IF SYMPTOMS DO NOT RESOLVE IN 5MIN, REPEAT DOSE AND CALL 911 (Patient not taking: Reported on 7/29/2021) 2 Bottle 1    Allergies   Allergen Reactions     Ticagrelor Shortness Of Breath     Isosorbide Headache         Lab Results    Chemistry/lipid CBC Cardiac Enzymes/BNP/TSH/INR   Lab Results   Component Value Date    CHOL 126 07/15/2021    HDL 42 07/15/2021    TRIG 136 07/15/2021    BUN 15 07/17/2021     07/17/2021    CO2 22 07/17/2021    Lab Results   Component Value Date    WBC 13.9 (H) 07/17/2021    HGB 15.7 07/17/2021    HCT 47.1 07/17/2021    MCV 88 07/17/2021     07/17/2021    Lab Results   Component Value Date     TROPONINI 0.08 07/17/2021        40  minutes spent on the date of encounter doing chart review, review of test results, interpretation with above tests, patient visit and documentation.      This note has been dictated using voice recognition software. Any grammatical, typographical, or context distortions are unintentional and inherent to the software

## 2021-07-29 ENCOUNTER — OFFICE VISIT (OUTPATIENT)
Dept: CARDIOLOGY | Facility: CLINIC | Age: 66
End: 2021-07-29
Payer: COMMERCIAL

## 2021-07-29 VITALS
HEART RATE: 72 BPM | HEIGHT: 68 IN | DIASTOLIC BLOOD PRESSURE: 66 MMHG | RESPIRATION RATE: 16 BRPM | SYSTOLIC BLOOD PRESSURE: 102 MMHG | WEIGHT: 212 LBS | BODY MASS INDEX: 32.13 KG/M2

## 2021-07-29 DIAGNOSIS — I10 ESSENTIAL HYPERTENSION, BENIGN: ICD-10-CM

## 2021-07-29 DIAGNOSIS — I25.118 CORONARY ARTERY DISEASE OF NATIVE ARTERY OF NATIVE HEART WITH STABLE ANGINA PECTORIS (H): Primary | ICD-10-CM

## 2021-07-29 DIAGNOSIS — Z98.890 STATUS POST CORONARY ANGIOGRAM: ICD-10-CM

## 2021-07-29 DIAGNOSIS — E78.5 DYSLIPIDEMIA, GOAL LDL BELOW 70: ICD-10-CM

## 2021-07-29 DIAGNOSIS — R06.09 DYSPNEA ON EXERTION: ICD-10-CM

## 2021-07-29 PROCEDURE — 99215 OFFICE O/P EST HI 40 MIN: CPT | Performed by: NURSE PRACTITIONER

## 2021-07-29 ASSESSMENT — MIFFLIN-ST. JEOR: SCORE: 1711.13

## 2021-07-29 NOTE — PATIENT INSTRUCTIONS
Bertrand Palacios,    It was a pleasure to see you today at the [facility].     My recommendations after this visit include:    - No medications changes made today    - Please seek medical attention if you develop recurrent chest pain or shortness of breath or similar symptoms you experienced prior to recent cardiac event    -Fasting lipid check in 8 weeks. You will need to fast for 8-12 hrs.Water, black coffee or tea is okay without creamer or sugar    - Dr. Juárez recommended repeat exercise stress ECHO in 6 months     - Follow up with Dr. Choi as scheduled on 8/26    - Please call [phone], if you have any questions or concerns    Villa Carver CNP    Medication     o Take all your medications as prescribed  o Do not stop any medications without talking with a healthcare provider    Exercise      o Physical activity is important for overall health  o Set a goal of 150 minutes of exercise each week  o For example, 30 minutes of exercise 5 days each week.    o These 30 minutes can be broken into shorter periods of 15 minutes twice daily or 10 minutes three times daily  o Start any exercise program slowly and work towards the goal of 150 minutes each week  o For example, you may start with 10 minutes and plan to add a few minutes each week as you get stronger   o Examples of exercise include walking, swimming, or biking  o Remember to stretch and stay hydrated with exercise    Diet     o A heart healthy diet includes:  o A variety of fruits and vegetables  o Whole grains  o Low-fat dairy (fat-free, 1% fat, and low-fat)  o Lean meats and poultry without skin   o Fish (eat fish 2 times each week)  o Nuts  o Limit saturated fat to about 13 grams each day (based on a 2000 calorie diet)  o Limit red meat  o Limit sugars (sweets and sugary beverages)  o Limit your portion sizes  o Do not add salt to your food when cooking or at the table  o Limit alcohol intake (no more than 1 drink each day  for women or 2 drinks each day for men)    Weight Loss     o Work on losing weight with diet and exercise  o You BMI (body mass index) should be between 18.5-24.9  o This is a calculation of your weight and height  o Please ask your healthcare provider for your BMI    Manage Other Chronic Health Conditions     o Control cholesterol  o Eat a diet low in saturated fat  o Exercise   o Take a statin medication as prescribed  o Manage blood pressure  o Eat a diet low in sodium  o Exercise  o Reduce stress  o Lose weight   o Take blood pressure medications as prescribed  o Control blood sugars if diabetic  o Monitor sugars and carbohydrates in your diet  o Lose weight   o Take diabetes medications as prescribed  o Follow-up with your primary care provider to make sure your blood sugars are well controlled    Stress Reduction     o Find time each day to relax  o Reading, listening to music, yoga, meditation, exercise, spending time with friends and family, volunteering   o Get 6-8 hours of sleep each night    Smoking Cessation     o Smoking causes numerous health problems including coronary artery disease  o It is never too late to quit  o Set realistic goals for quitting  o Decrease the number of cigarettes used each week  o Use nicotine gum or patches to help you quit    Information from the American Heart Association.  Please visit their website at www.heart.org    Patient Education     Eating Heart-Healthy Foods  Eating has a big impact on your heart health. In fact, eating healthier can improve several of your heart risks at once. For instance, it helps you manage weight, cholesterol, and blood pressure. Here are ideas to help you make heart-healthy changes without giving up all the foods and flavors you love.   Getting started    Talk with your healthcare provider about eating plans, such as the DASH or Mediterranean diet. You may also be referred to a dietitian.    Change a few things at a time. Give yourself time  to get used to a few eating changes before adding more.    Work to create a tasty, healthy eating plan that you can stick to for the rest of your life.    Goals for healthy eating  Below are some tips to improve your eating habits:     Limit saturated fats and trans fats. Saturated fats raise your levels of cholesterol, so keep these fats to a minimum. They are found in foods such as fatty meats, whole milk, cheese, and palm and coconut oils. Avoid trans fats because they lower good cholesterol as well as raise bad cholesterol. Trans fats are most often found in processed foods, such as pastries, cookies, pies, muffins, fried foods, stick margarines, and shortening.    Reduce how much sodium (salt) you have. Eating too much salt may increase your blood pressure. Limit your sodium intake to 2,300 milligrams (mg) per day (the amount in 1 teaspoon of salt), or less if your healthcare provider recommends it. Dining out less often and eating fewer processed foods are two great ways to decrease the amount of salt you consume. At home, flavor your foods with other spices and herbs instead of salt.    Managing calories. A calorie is a unit of energy. Your body burns calories for fuel, but if you eat more calories than your body burns, the extras are stored as fat. Your healthcare provider can help you create a diet plan to manage your calories. This will likely include eating healthier foods and getting regular exercise. To help you track your progress, keep a diary to record what you eat and how often you exercise.  Choose the right foods  Aim to make these foods staples of your diet. If you have diabetes, you may have different recommendations than what is listed here:     Fruits and vegetables provide plenty of nutrients without a lot of calories. At meals, fill half your plate with these foods. Choose between fresh, frozen, canned, or dried without added sauces, salt, or sugars. Split the other half of your plate  between whole grains and lean protein.    Whole grains are high in fiber and rich in vitamins and nutrients. Good choices include whole wheat bread, pasta, oats, and brown rice. Make at least half of your grains whole grains.    Lean proteins give you nutrition with less fat. Good choices include fish, skinless chicken and turkey, and beans. Draining the fat from cooked ground meat is another way to reduce the amount of fat you eat.    Low-fat and nonfat dairy provide nutrients without a lot of fat. Try low-fat or nonfat milk, cheese, or yogurt.    Healthy fats can be good for you in small amounts. These are unsaturated fats, such as olive oil, nuts, and fish. Try to have at least 2 servings per week of fatty fish, such as salmon, sardines, mackerel, rainbow trout, and albacore tuna. These contain omega-3 fatty acids, which are good for your heart. Flaxseed and walnuts are other sources of heart-healthy fats.  More on heart-healthy eating  Read food labels  Healthy eating starts at the grocery store. Be sure to pay attention to food labels on packaged foods. Look for products that are high in fiber and protein, and low in saturated fat, added sugars, and sodium. Avoid products that contain trans fat. And pay close attention to serving size. For instance, if you plan to eat two servings, double all the numbers on the label.   Prepare food right  A key part of healthy cooking is cutting down on added fat, sugar and salt. Look on the internet for lower-fat, lower-sodium recipes without a lot of added sugars. Also try these tips:     Remove fat from meat and skin from poultry before cooking.    Skim fat from the surface of soups and sauces.    Broil, roast, boil, bake, steam, grill, or microwave food without added fats.    Choose ingredients that spice up your food without adding calories, fat, sugar, or sodium. Try these items: horseradish, hot sauce, lemon, mustard, nonfat salad dressings, and vinegar. Small amounts  of olive oil-based vinaigrettes are OK, too. For salt-free herbs and spices, try basil, cilantro, cinnamon, cumin, paprika, pepper, and rosemary.  [employer] last reviewed this educational content on 7/1/2020 2000-2021 [employer]. All rights reserved. This information is not intended as a substitute for professional medical care. Always follow your healthcare professional's instructions.

## 2021-07-29 NOTE — LETTER
7/29/2021    Mayela Rayo MD  [facility] [address]  [place] [address]    RE: Bertrnad Palacios       Dear Colleague,    I had the pleasure of seeing Bertrand Palacios in the [facility].            Assessment/Recommendations   Assessment:    1.  Coronary artery disease with s/p PCI to LAD on 3/6/2020:Coronary angiogram on 7/15/2021 showed 60% stenosis in proximal to mid RCA, 80% stenosis in RPDA-1, 70% stenosis in RPDA-2.  Lesions and proximal RCA to mid RCA was successfully treated with a drug-eluting stents with 0% residual post intervention and no complications.  Previous stents in proximal and mid LAD were found patent.    Dyspnea on exertion resolved and energy level improved since recent stent placement.  Cardiac rehab has been on 7/28/2021    Reviewed most recent BMP, Hgb, platelet- stable.  On dual antiplatelet therapy with aspirin 81 indefinitely and clopidogrel 75 mg daily for 12 months.  On as needed nitroglycerin. Also on Nitropatch 0.2 mg/h daily x 30 days.    2.  Dyslipidemia with LDL goal <70/Obesity with a BMI of: Bertrand Palacios is on high intensity statin therapy with rosuvastatin 20 mg daily. Most recent LDL is 57 on 7/15/2021.  Most recent AST/ALT are stable on 2/20/2020.    3.  Hypertension: His blood pressure is 102/66-asymptomatic. Currently on lisinopril and metoprolol succinate.    Plan/Recommendation:  1.We discussed the importance of antiplatelet therapy and talking with his cardiologist prior to stopping these medications for any reason.  We discussed about utilization of as needed nitroglycerin.  Encouraged to seek medical attention if recurrent chest pain or shortness of breath.  Continue DAPT for 12 months and then aspirin 81 mg indefinitely.  Nitro patch for 1 month and then stop.    2.Risk factor modification and lifestyle management topics were discussed including managing comorbidities, weight  loss, heart healthy diet, exercise, smoking cessation and stress reduction.  Continue cardiac rehab as scheduled    3. We discussed a diet low in saturated fat, weight loss, and exercise along with medication for better control of cholesterol.  Highly encouraged to participate in nutrition class in cardiac rehab.  Repeat fasting lipid in 2 months.  Patient prefers walk-in to the main lab.  Order placed.  Instruction given.    4. Continue current hypertension regimen    5. Repeat exercise nuclear stress test in 6 months per Dr. Juárez    Follow up with Dr. Choi as scheduled in August.      History of Present Illness/Subjective    Mr. Bertrand Palacios is a 66 year old male with a past medical history of coronary artery disease with remote history of and most recently PCI in 2006, obesity PCI to LAD in March 2020, dyslipidemia, and hypertension who is seen at [facility] for post coronary intervention follow up.     Patient saw Dr. Choi on June 8 with new onset of dyspnea with moderate  Exertion.  Echocardiogram exercise stress was abnormal with 1 mm of horizontal ST segment depression in lead II, V3, V4, and V5 at 4.26 minutes of stress and returning to baseline after 1 minutes into recovery phase.  Patient's exercise tolerance was moderately impaired.  Stress echocardiogram was positive for inducible myocardial ischemia.    Coronary angiogram on 7/15/2021 showed 60% stenosis in proximal to mid RCA, 80% stenosis in RPDA-1, 70% stenosis in RPDA-2.  Lesions and proximal RCA to mid RCA was successfully treated with a drug-eluting stents with 0% residual post intervention and no complications.  Previous stents in proximal and mid LAD were found patent.    Patient visited ER the day after he got discharged from the hospital due to right-sided chest discomfort.  His EKG was unremarkable.  Troponin was negative.  D-dimer was negative.  Chest x-ray was negative except minimal  atelectasis.    Today, Bertrand reports feeling significant improvement in his shortness of breath and energy level since recent stent placement.  He has initiated cardiac rehab.  He has been active and mowing the lawn and has been tolerating with no cardiac symptoms.  Patient is no longer experiencing right-sided chest discomfort.  Apparently he went out to eat with his daughter and thinks he had food poisoning.  He felt better after he got the antinausea medication in the hospital.  He does not report any bleeding complications.     He denies further fatigue, lightheadedness, shortness of breath, dyspnea on exertion, orthopnea, PND, palpitations, chest pain, abdominal fullness/bloating and lower extremity edema.     Coronary Angiogram on 7/6/21- reviewed:  Conclusion    Prox RCA to Mid RCA lesion is 60% stenosed. Lesion successfully treated with 3.0 drug-eluting stent with wide patency    RPDA-1 lesion is 80% stenosed. Lesion successfully treated with 2.25 drug-eluting stent with wide patency    RPDA-2 lesion is 70% stenosed. Lesion successfully treated with 2.25 drug-eluting stent with wide patency    Previously stents in proximal and mid LAD are widely patent    Attempt to access right radial artery unsuccessful no complication    Right femoral access successful with closure using VIP Angio-Seal device without complication         Recommendations General Recommendations:  - Patient given specific instructions regarding care of arteriotomy site, activity restrictions, signs and symptoms of cardiac or vascular complications and to seek immediate medical evaluation should they occur.   - Arterial sheath removed from the femoral artery with closure device.   - Femoral angiogram identifies arterial sheath placement is suitable for closure device.     Exercise stress echo pm 6/29/21: Reviewed  Narrative & Impression    When compared to the previous study dated 2/25/2020,    The stress electrocardiogram was abnormal with 1  "mm of horizontal ST segment depression in the II, V3, V4 and V5 leads, beginning at 4.26 minutes of stress, and returning to baseline after 1 minutes into the recovery phase.    The patient's exercise tolerance was moderately impaired.    Stress echocardiogram is positive for inducible ischemia.       Physical Examination Review of Systems   /66 (BP Location: Left arm, Patient Position: Sitting, Cuff Size: Adult Large)   Pulse 72   Resp 16   Ht 1.727 m (5' 8\")   Wt 96.2 kg (212 lb)   BMI 32.23 kg/m    Body mass index is 32.23 kg/m .  Wt Readings from Last 3 Encounters:   07/29/21 96.2 kg (212 lb)   07/17/21 97.6 kg (215 lb 3.2 oz)   07/16/21 96.1 kg (211 lb 12.8 oz)     General Appearance:   no distress, normal body habitus   ENT/Mouth: membranes moist, no oral lesions or bleeding gums.      EYES:  no scleral icterus, normal conjunctivae   Neck: no carotid bruits or thyromegaly   Chest/Lungs:   lungs are clear to auscultation, no rales or wheezing, equal chest wall expansion    Cardiovascular:    Heart rate regular. Normal first and second heart sounds with no murmurs, rubs, or gallops; the carotid, radial and posterior tibial pulses are intact, Jugular venous pressure flat, no edema bilaterally    Abdomen:  no organomegaly, masses, bruits, or tenderness; bowel sounds are present   Extremities  Puncture Site: no cyanosis or clubbing  Right femoral site is soft.  Radial pulses and Pedal pulses intact and symmetrical.  CMS intact.   Skin: no xanthelasma, warm.    Neurologic: normal  bilateral, no tremors     Psychiatric: alert and oriented x3, calm               Negative unless noted in HPI                                         Medical History  Surgical History Family History Social History   Past Medical History:   Diagnosis Date     Cancer (H)     bladder     Coronary artery disease     S/P PCI in 2006 Per H&P     Hiatal hernia     Per H&P     History of ASCVD     per H&P     Hypertension     Past " Surgical History:   Procedure Laterality Date     BLADDER SURGERY  2008    tumor      CARDIAC CATHETERIZATION      2006 Per H&P     CV CORONARY ANGIOGRAM N/A 3/6/2020    Procedure: Coronary Angiogram;  Surgeon: Tony Juárez MD;  Location: [facility] Cath Lab;  Service: Cardiology     CV HEART CATHETERIZATION WITH POSSIBLE INTERVENTION N/A 7/15/2021    Procedure: Coronary Angiogram;  Surgeon: Tony Juárez MD;  Location: [facility] CATH LAB CV     CV PCI N/A 7/15/2021    Procedure: PERCUTANEOUS CORONARY INTERVENTION;  Surgeon: Tony Juárez MD;  Location: [facility] CATH LAB CV     IR MISCELLANEOUS PROCEDURE  2006    Family History   Problem Relation Age of Onset     Cerebrovascular Disease Mother      Heart Disease Mother      Cancer Father      No Known Problems Sister      Other - See Comments Brother 77.00     No Known Problems Brother      No Known Problems Sister     Social History     Socioeconomic History     Marital status:      Spouse name: Not on file     Number of children: Not on file     Years of education: Not on file     Highest education level: Not on file   Occupational History     Not on file   Tobacco Use     Smoking status: Former Smoker     Quit date: 2008     Years since quittin.7     Smokeless tobacco: Never Used   Substance and Sexual Activity     Alcohol use: Never     Drug use: Not Currently     Sexual activity: Not Currently   Other Topics Concern     Not on file   Social History Narrative     Not on file     Social Determinants of Health     Financial Resource Strain:      Difficulty of Paying Living Expenses:    Food Insecurity:      Worried About Running Out of Food in the Last Year:      Ran Out of Food in the Last Year:    Transportation Needs:      Lack of Transportation (Medical):      Lack of Transportation (Non-Medical):    Physical Activity:      Days of Exercise per Week:      Minutes of Exercise per Session:    Stress:      Feeling of Stress  :    Social Connections:      Frequency of Communication with Friends and Family:      Frequency of Social Gatherings with Friends and Family:      Attends Gnosticist Services:      Active Member of Clubs or Organizations:      Attends Club or Organization Meetings:      Marital Status:    Intimate Partner Violence:      Fear of Current or Ex-Partner:      Emotionally Abused:      Physically Abused:      Sexually Abused:           Medications  Allergies   Current Outpatient Medications   Medication Sig Dispense Refill     aspirin 81 MG EC tablet [ASPIRIN 81 MG EC TABLET] Take 81 mg by mouth daily.       clopidogrel (PLAVIX) 75 MG tablet Take 1 tablet (75 mg) by mouth daily 90 tablet 3     gabapentin (NEURONTIN) 100 MG capsule [GABAPENTIN (NEURONTIN) 100 MG CAPSULE] Take 100 mg by mouth 3 (three) times a day as needed.        lisinopril (PRINIVIL,ZESTRIL) 2.5 MG tablet [LISINOPRIL (PRINIVIL,ZESTRIL) 2.5 MG TABLET] Take 2.5 mg by mouth daily.       metoprolol succinate (TOPROL-XL) 50 MG 24 hr tablet [METOPROLOL SUCCINATE (TOPROL-XL) 50 MG 24 HR TABLET] Take 1 tablet (50 mg total) by mouth daily. 90 tablet 1     nitroglycerin (NITRODUR) 0.2 mg/hr [NITROGLYCERIN (NITRODUR) 0.2 MG/HR] PLACE 1 PATCH ON THE SKIN EVERY DAY AT 8AM 90 patch 0     omeprazole (PRILOSEC) 20 MG capsule Take 20 mg by mouth 2 times daily (before meals)        rosuvastatin (CRESTOR) 20 MG tablet [ROSUVASTATIN (CRESTOR) 20 MG TABLET] TK 1 T PO HS       nitroglycerin (NITROSTAT) 0.4 MG SL tablet [NITROGLYCERIN (NITROSTAT) 0.4 MG SL TABLET] PLACE 1 TABLET UNDER THE TONGUE EVERY 5MIN AS NEEDED FOR CHEST PAIN. IF SYMPTOMS DO NOT RESOLVE IN 5MIN, REPEAT DOSE AND CALL 911 (Patient not taking: Reported on 7/29/2021) 2 Bottle 1    Allergies   Allergen Reactions     Ticagrelor Shortness Of Breath     Isosorbide Headache         Lab Results    Chemistry/lipid CBC Cardiac Enzymes/BNP/TSH/INR   Lab Results   Component Value Date    CHOL 126 07/15/2021    HDL 42  07/15/2021    TRIG 136 07/15/2021    BUN 15 07/17/2021     07/17/2021    CO2 22 07/17/2021    Lab Results   Component Value Date    WBC 13.9 (H) 07/17/2021    HGB 15.7 07/17/2021    HCT 47.1 07/17/2021    MCV 88 07/17/2021     07/17/2021    Lab Results   Component Value Date    TROPONINI 0.08 07/17/2021        40  minutes spent on the date of encounter doing chart review, review of test results, interpretation with above tests, patient visit and documentation.      This note has been dictated using voice recognition software. Any grammatical, typographical, or context distortions are unintentional and inherent to the software          Thank you for allowing me to participate in the care of your patient.      Sincerely,     CHARLES Martel [facility]  cc:   Mayela Rayo MD  [facility]  [address]  [place] [address]

## 2021-07-30 ENCOUNTER — HOSPITAL ENCOUNTER (OUTPATIENT)
Dept: CARDIAC REHAB | Facility: HOSPITAL | Age: 66
End: 2021-07-30
Attending: INTERNAL MEDICINE
Payer: COMMERCIAL

## 2021-07-30 PROCEDURE — 93798 PHYS/QHP OP CAR RHAB W/ECG: CPT

## 2021-08-02 ENCOUNTER — HOSPITAL ENCOUNTER (OUTPATIENT)
Dept: CARDIAC REHAB | Facility: HOSPITAL | Age: 66
End: 2021-08-02
Attending: INTERNAL MEDICINE
Payer: COMMERCIAL

## 2021-08-02 PROCEDURE — 93798 PHYS/QHP OP CAR RHAB W/ECG: CPT

## 2021-08-04 ENCOUNTER — HOSPITAL ENCOUNTER (OUTPATIENT)
Dept: CARDIAC REHAB | Facility: HOSPITAL | Age: 66
End: 2021-08-04
Attending: INTERNAL MEDICINE
Payer: COMMERCIAL

## 2021-08-04 PROCEDURE — 93798 PHYS/QHP OP CAR RHAB W/ECG: CPT

## 2021-08-09 ENCOUNTER — HOSPITAL ENCOUNTER (OUTPATIENT)
Dept: CARDIAC REHAB | Facility: HOSPITAL | Age: 66
End: 2021-08-09
Attending: INTERNAL MEDICINE
Payer: COMMERCIAL

## 2021-08-09 PROCEDURE — 93798 PHYS/QHP OP CAR RHAB W/ECG: CPT

## 2021-08-11 ENCOUNTER — HOSPITAL ENCOUNTER (OUTPATIENT)
Dept: CARDIAC REHAB | Facility: HOSPITAL | Age: 66
End: 2021-08-11
Attending: INTERNAL MEDICINE
Payer: COMMERCIAL

## 2021-08-11 PROCEDURE — 93798 PHYS/QHP OP CAR RHAB W/ECG: CPT

## 2021-08-13 ENCOUNTER — HOSPITAL ENCOUNTER (OUTPATIENT)
Dept: CARDIAC REHAB | Facility: HOSPITAL | Age: 66
End: 2021-08-13
Attending: INTERNAL MEDICINE
Payer: COMMERCIAL

## 2021-08-13 PROCEDURE — 93798 PHYS/QHP OP CAR RHAB W/ECG: CPT

## 2021-08-16 ENCOUNTER — HOSPITAL ENCOUNTER (OUTPATIENT)
Dept: CARDIAC REHAB | Facility: HOSPITAL | Age: 66
End: 2021-08-16
Attending: INTERNAL MEDICINE
Payer: COMMERCIAL

## 2021-08-16 PROCEDURE — 93798 PHYS/QHP OP CAR RHAB W/ECG: CPT

## 2021-08-18 ENCOUNTER — HOSPITAL ENCOUNTER (OUTPATIENT)
Dept: CARDIAC REHAB | Facility: HOSPITAL | Age: 66
End: 2021-08-18
Attending: INTERNAL MEDICINE
Payer: COMMERCIAL

## 2021-08-18 PROCEDURE — 93798 PHYS/QHP OP CAR RHAB W/ECG: CPT

## 2021-08-20 ENCOUNTER — HOSPITAL ENCOUNTER (OUTPATIENT)
Dept: CARDIAC REHAB | Facility: HOSPITAL | Age: 66
End: 2021-08-20
Attending: INTERNAL MEDICINE
Payer: COMMERCIAL

## 2021-08-20 PROCEDURE — 93798 PHYS/QHP OP CAR RHAB W/ECG: CPT

## 2021-08-23 ENCOUNTER — HOSPITAL ENCOUNTER (OUTPATIENT)
Dept: CARDIAC REHAB | Facility: HOSPITAL | Age: 66
End: 2021-08-23
Attending: INTERNAL MEDICINE
Payer: COMMERCIAL

## 2021-08-23 PROCEDURE — 93798 PHYS/QHP OP CAR RHAB W/ECG: CPT

## 2021-08-25 ENCOUNTER — HOSPITAL ENCOUNTER (OUTPATIENT)
Dept: CARDIAC REHAB | Facility: HOSPITAL | Age: 66
End: 2021-08-25
Attending: INTERNAL MEDICINE
Payer: COMMERCIAL

## 2021-08-25 PROCEDURE — 93798 PHYS/QHP OP CAR RHAB W/ECG: CPT

## 2021-08-26 ENCOUNTER — OFFICE VISIT (OUTPATIENT)
Dept: CARDIOLOGY | Facility: CLINIC | Age: 66
End: 2021-08-26
Payer: COMMERCIAL

## 2021-08-26 VITALS
HEIGHT: 68 IN | BODY MASS INDEX: 31.98 KG/M2 | SYSTOLIC BLOOD PRESSURE: 118 MMHG | DIASTOLIC BLOOD PRESSURE: 62 MMHG | RESPIRATION RATE: 16 BRPM | HEART RATE: 62 BPM | WEIGHT: 211 LBS

## 2021-08-26 DIAGNOSIS — I25.10 CORONARY ARTERY DISEASE INVOLVING NATIVE CORONARY ARTERY OF NATIVE HEART WITHOUT ANGINA PECTORIS: Primary | ICD-10-CM

## 2021-08-26 PROCEDURE — 99213 OFFICE O/P EST LOW 20 MIN: CPT | Performed by: INTERNAL MEDICINE

## 2021-08-26 ASSESSMENT — MIFFLIN-ST. JEOR: SCORE: 1706.59

## 2021-08-26 NOTE — LETTER
8/26/2021    Mayela Rayo MD  [facility] [address]  [place] [address]    RE: Bertrand Palacios       Dear Colleague,    I had the pleasure of seeing Bertrand Palacios in the [facility].      HEART CARE NOTE          Assessment/Recommendations     1. CAD  Assessment / Plan    S/p multiple stents with the most recent being PCI to the prox RCA, rPDA x2 with EDNA on 7/15/21    Patient denies any chest pain or anginal equivalents since intervention    Continue ASA, lisinopril, metoprolol succinate, rosuvastatin and clopidogrel     2. HLP  Assessment / Plan    Continue rosuvastatin    History of Present Illness/Subjective      Mr. Bertrand Palacios is a 64 y.o. male with a PMHx significant for  HLP (on simvastatin), CAD s/p PCI (most recent PCI to the LAD on 3/6/20) and multiple stents (on ASA, ACE-I, statin) who presents to Heart Care clinic for follow-up visit.     Today, Mr. Palacios endorses significantly improved functional capacity s/p recent stenting. He denies chest pain or anginal equivalents. He also denies chest pain, palpitations, orthopnea, PND, presyncope/syncope, lightheadedness, dizziness.       ECG: Personally reviewed. normal EKG, normal sinus rhythm, unchanged from previous tracings.     Coronary angiogram:    Prox RCA to Mid RCA lesion is 60% stenosed. Lesion successfully treated with 3.0 drug-eluting stent with wide patency    RPDA-1 lesion is 80% stenosed. Lesion successfully treated with 2.25 drug-eluting stent with wide patency    RPDA-2 lesion is 70% stenosed. Lesion successfully treated with 2.25 drug-eluting stent with wide patency    Previously stents in proximal and mid LAD are widely patent    Attempt to access right radial artery unsuccessful no complication    Right femoral access successful with closure using VIP Angio-Seal device without complication     Echo:  1. The left ventricle is normal in size. Left  "ventricular systolic performance is at the lower limits of normal. The ejection fraction is estimated to be 50%.    2. There is moderate distal lateral and distal anterior hypokinesis.  3. No significant valvular heart disease is identified on this study.   4. Normal right ventricular size and systolic performance.           Physical Examination Review of Systems   /62 (BP Location: Right arm, Patient Position: Sitting, Cuff Size: Adult Regular)   Pulse 62   Resp 16   Ht 1.727 m (5' 8\")   Wt 95.7 kg (211 lb)   BMI 32.08 kg/m    Body mass index is 32.08 kg/m .  Wt Readings from Last 3 Encounters:   08/26/21 95.7 kg (211 lb)   07/29/21 96.2 kg (212 lb)   07/17/21 97.6 kg (215 lb 3.2 oz)     General Appearance:   no distress, normal body habitus   ENT/Mouth: membranes moist, no oral lesions or bleeding gums.      EYES:  no scleral icterus, normal conjunctivae   Neck: no carotid bruits or thyromegaly   Chest/Lungs:   lungs are clear to auscultation, no rales or wheezing, equal chest wall expansion    Cardiovascular:   Regular. Normal first and second heart sounds with no murmurs, rubs, or gallops; the carotid, radial and posterior tibial pulses are intact, no JVD or LE edema bilaterally    Abdomen:  no organomegaly, masses, bruits, or tenderness; bowel sounds are present   Extremities: no cyanosis or clubbing   Skin: no xanthelasma, warm.    Neurologic: normal gait, normal  bilateral, no tremors     Psychiatric: alert and oriented x3, calm     A complete 10 systems ROS was reviewed  And is negative except what is listed in the HPI.          Medical History  Surgical History Family History Social History   Past Medical History:   Diagnosis Date     Cancer (H)     bladder     Coronary artery disease     S/P PCI in 2006 Per H&P     Hiatal hernia     Per H&P     History of ASCVD     per H&P     Hypertension     Past Surgical History:   Procedure Laterality Date     BLADDER SURGERY  2008    tumor      CARDIAC " CATHETERIZATION      2006 Per H&P     CV CORONARY ANGIOGRAM N/A 3/6/2020    Procedure: Coronary Angiogram;  Surgeon: Tony Juárez MD;  Location: [facility] Cath Lab;  Service: Cardiology     CV HEART CATHETERIZATION WITH POSSIBLE INTERVENTION N/A 7/15/2021    Procedure: Coronary Angiogram;  Surgeon: Tony Juárez MD;  Location: [facility] CATH LAB CV     CV PCI N/A 7/15/2021    Procedure: PERCUTANEOUS CORONARY INTERVENTION;  Surgeon: Tony Juárez MD;  Location: [facility] CATH LAB CV     IR MISCELLANEOUS PROCEDURE  2006    no family history of premature coronary artery disease Social History     Socioeconomic History     Marital status:      Spouse name: Not on file     Number of children: Not on file     Years of education: Not on file     Highest education level: Not on file   Occupational History     Not on file   Tobacco Use     Smoking status: Former Smoker     Quit date: 2008     Years since quittin.7     Smokeless tobacco: Never Used   Substance and Sexual Activity     Alcohol use: Never     Drug use: Not Currently     Sexual activity: Not Currently   Other Topics Concern     Not on file   Social History Narrative     Not on file     Social Determinants of Health     Financial Resource Strain:      Difficulty of Paying Living Expenses:    Food Insecurity:      Worried About Running Out of Food in the Last Year:      Ran Out of Food in the Last Year:    Transportation Needs:      Lack of Transportation (Medical):      Lack of Transportation (Non-Medical):    Physical Activity:      Days of Exercise per Week:      Minutes of Exercise per Session:    Stress:      Feeling of Stress :    Social Connections:      Frequency of Communication with Friends and Family:      Frequency of Social Gatherings with Friends and Family:      Attends Evangelical Services:      Active Member of Clubs or Organizations:      Attends Club or Organization Meetings:      Marital Status:    Intimate  Partner Violence:      Fear of Current or Ex-Partner:      Emotionally Abused:      Physically Abused:      Sexually Abused:            Lab Results    Chemistry/lipid CBC Cardiac Enzymes/BNP/TSH/INR   Lab Results   Component Value Date    CHOL 126 07/15/2021    HDL 42 07/15/2021    TRIG 136 07/15/2021    BUN 15 07/17/2021     07/17/2021    CO2 22 07/17/2021    Lab Results   Component Value Date    WBC 13.9 (H) 07/17/2021    HGB 15.7 07/17/2021    HCT 47.1 07/17/2021    MCV 88 07/17/2021     07/17/2021    Lab Results   Component Value Date    TROPONINI 0.08 07/17/2021     Lab Results   Component Value Date    TROPONINI 0.08 07/17/2021          Weight:    Wt Readings from Last 3 Encounters:   07/29/21 96.2 kg (212 lb)   07/17/21 97.6 kg (215 lb 3.2 oz)   07/16/21 96.1 kg (211 lb 12.8 oz)       Allergies  Allergies   Allergen Reactions     Ticagrelor Shortness Of Breath     Isosorbide Headache         Surgical History  Past Surgical History:   Procedure Laterality Date     BLADDER SURGERY  2008    tumor      CARDIAC CATHETERIZATION      2006 Per H&P     CV CORONARY ANGIOGRAM N/A 3/6/2020    Procedure: Coronary Angiogram;  Surgeon: Tony Juárez MD;  Location: [facility] Cath Lab;  Service: Cardiology     CV HEART CATHETERIZATION WITH POSSIBLE INTERVENTION N/A 7/15/2021    Procedure: Coronary Angiogram;  Surgeon: Tony Juárez MD;  Location: [facility] CATH LAB CV     CV PCI N/A 7/15/2021    Procedure: PERCUTANEOUS CORONARY INTERVENTION;  Surgeon: Tony Juárez MD;  Location: [facility] CATH LAB CV     IR MISCELLANEOUS PROCEDURE  11/9/2006       Social History  Tobacco:   History   Smoking Status     Former Smoker     Quit date: 11/17/2008   Smokeless Tobacco     Never Used    Alcohol:   Social History    Substance and Sexual Activity      Alcohol use: Never   Illicit Drugs:   History   Drug Use Unknown       Family History  Family History   Problem Relation Age of Onset     Cerebrovascular  Disease Mother      Heart Disease Mother      Cancer Father      No Known Problems Sister      Other - See Comments Brother 77.00     No Known Problems Brother      No Known Problems Sister           Mela Choi MD on 8/26/2021      cc: Mayela Rayo        Thank you for allowing me to participate in the care of your patient.      Sincerely,     Mela Choi MD     [facility]  cc:   Mayela Rayo MD  [facility]  [address]  [place] [address]

## 2021-08-26 NOTE — PROGRESS NOTES
HEART CARE NOTE          Assessment/Recommendations     1. CAD  Assessment / Plan    S/p multiple stents with the most recent being PCI to the prox RCA, rPDA x2 with EDNA on 7/15/21    Patient denies any chest pain or anginal equivalents since intervention    Continue ASA, lisinopril, metoprolol succinate, rosuvastatin and clopidogrel     2. HLP  Assessment / Plan    Continue rosuvastatin    History of Present Illness/Subjective      Mr. Bertrand Palacios is a 64 y.o. male with a PMHx significant for  HLP (on simvastatin), CAD s/p PCI (most recent PCI to the LAD on 3/6/20) and multiple stents (on ASA, ACE-I, statin) who presents to [facility] Care clinic for follow-up visit.     Today, Mr. Palacios endorses significantly improved functional capacity s/p recent stenting. He denies chest pain or anginal equivalents. He also denies chest pain, palpitations, orthopnea, PND, presyncope/syncope, lightheadedness, dizziness.       ECG: Personally reviewed. normal EKG, normal sinus rhythm, unchanged from previous tracings.     Coronary angiogram:    Prox RCA to Mid RCA lesion is 60% stenosed. Lesion successfully treated with 3.0 drug-eluting stent with wide patency    RPDA-1 lesion is 80% stenosed. Lesion successfully treated with 2.25 drug-eluting stent with wide patency    RPDA-2 lesion is 70% stenosed. Lesion successfully treated with 2.25 drug-eluting stent with wide patency    Previously stents in proximal and mid LAD are widely patent    Attempt to access right radial artery unsuccessful no complication    Right femoral access successful with closure using VIP Angio-Seal device without complication     Echo:  1. The left ventricle is normal in size. Left ventricular systolic performance is at the lower limits of normal. The ejection fraction is estimated to be 50%.    2. There is moderate distal lateral and distal anterior hypokinesis.  3. No significant valvular heart disease is identified on this study.   4. Normal right  "ventricular size and systolic performance.           Physical Examination Review of Systems   /62 (BP Location: Right arm, Patient Position: Sitting, Cuff Size: Adult Regular)   Pulse 62   Resp 16   Ht 1.727 m (5' 8\")   Wt 95.7 kg (211 lb)   BMI 32.08 kg/m    Body mass index is 32.08 kg/m .  Wt Readings from Last 3 Encounters:   08/26/21 95.7 kg (211 lb)   07/29/21 96.2 kg (212 lb)   07/17/21 97.6 kg (215 lb 3.2 oz)     General Appearance:   no distress, normal body habitus   ENT/Mouth: membranes moist, no oral lesions or bleeding gums.      EYES:  no scleral icterus, normal conjunctivae   Neck: no carotid bruits or thyromegaly   Chest/Lungs:   lungs are clear to auscultation, no rales or wheezing, equal chest wall expansion    Cardiovascular:   Regular. Normal first and second heart sounds with no murmurs, rubs, or gallops; the carotid, radial and posterior tibial pulses are intact, no JVD or LE edema bilaterally    Abdomen:  no organomegaly, masses, bruits, or tenderness; bowel sounds are present   Extremities: no cyanosis or clubbing   Skin: no xanthelasma, warm.    Neurologic: normal gait, normal  bilateral, no tremors     Psychiatric: alert and oriented x3, calm     A complete 10 systems ROS was reviewed  And is negative except what is listed in the HPI.          Medical History  Surgical History Family History Social History   Past Medical History:   Diagnosis Date     Cancer (H)     bladder     Coronary artery disease     S/P PCI in 2006 Per H&P     Hiatal hernia     Per H&P     History of ASCVD     per H&P     Hypertension     Past Surgical History:   Procedure Laterality Date     BLADDER SURGERY  2008    tumor      CARDIAC CATHETERIZATION      2006 Per H&P     CV CORONARY ANGIOGRAM N/A 3/6/2020    Procedure: Coronary Angiogram;  Surgeon: Tony Juárez MD;  Location: [facility] Cath Lab;  Service: Cardiology     CV HEART CATHETERIZATION WITH POSSIBLE INTERVENTION N/A 7/15/2021    " Procedure: Coronary Angiogram;  Surgeon: Tony Juárez MD;  Location: [facility] CATH LAB CV     CV PCI N/A 7/15/2021    Procedure: PERCUTANEOUS CORONARY INTERVENTION;  Surgeon: Tony Juárez MD;  Location: [facility] CATH LAB CV     IR MISCELLANEOUS PROCEDURE  2006    no family history of premature coronary artery disease Social History     Socioeconomic History     Marital status:      Spouse name: Not on file     Number of children: Not on file     Years of education: Not on file     Highest education level: Not on file   Occupational History     Not on file   Tobacco Use     Smoking status: Former Smoker     Quit date: 2008     Years since quittin.7     Smokeless tobacco: Never Used   Substance and Sexual Activity     Alcohol use: Never     Drug use: Not Currently     Sexual activity: Not Currently   Other Topics Concern     Not on file   Social History Narrative     Not on file     Social Determinants of Health     Financial Resource Strain:      Difficulty of Paying Living Expenses:    Food Insecurity:      Worried About Running Out of Food in the Last Year:      Ran Out of Food in the Last Year:    Transportation Needs:      Lack of Transportation (Medical):      Lack of Transportation (Non-Medical):    Physical Activity:      Days of Exercise per Week:      Minutes of Exercise per Session:    Stress:      Feeling of Stress :    Social Connections:      Frequency of Communication with Friends and Family:      Frequency of Social Gatherings with Friends and Family:      Attends Denominational Services:      Active Member of Clubs or Organizations:      Attends Club or Organization Meetings:      Marital Status:    Intimate Partner Violence:      Fear of Current or Ex-Partner:      Emotionally Abused:      Physically Abused:      Sexually Abused:            Lab Results    Chemistry/lipid CBC Cardiac Enzymes/BNP/TSH/INR   Lab Results   Component Value Date    CHOL 126 07/15/2021    HDL 42  07/15/2021    TRIG 136 07/15/2021    BUN 15 07/17/2021     07/17/2021    CO2 22 07/17/2021    Lab Results   Component Value Date    WBC 13.9 (H) 07/17/2021    HGB 15.7 07/17/2021    HCT 47.1 07/17/2021    MCV 88 07/17/2021     07/17/2021    Lab Results   Component Value Date    TROPONINI 0.08 07/17/2021     Lab Results   Component Value Date    TROPONINI 0.08 07/17/2021          Weight:    Wt Readings from Last 3 Encounters:   07/29/21 96.2 kg (212 lb)   07/17/21 97.6 kg (215 lb 3.2 oz)   07/16/21 96.1 kg (211 lb 12.8 oz)       Allergies  Allergies   Allergen Reactions     Ticagrelor Shortness Of Breath     Isosorbide Headache         Surgical History  Past Surgical History:   Procedure Laterality Date     BLADDER SURGERY  2008    tumor      CARDIAC CATHETERIZATION      2006 Per H&P     CV CORONARY ANGIOGRAM N/A 3/6/2020    Procedure: Coronary Angiogram;  Surgeon: Tony Juárez MD;  Location: [facility] Cath Lab;  Service: Cardiology     CV HEART CATHETERIZATION WITH POSSIBLE INTERVENTION N/A 7/15/2021    Procedure: Coronary Angiogram;  Surgeon: Tony Juárez MD;  Location: [facility] CATH LAB CV     CV PCI N/A 7/15/2021    Procedure: PERCUTANEOUS CORONARY INTERVENTION;  Surgeon: Tony Juárez MD;  Location: [facility] CATH LAB CV     IR MISCELLANEOUS PROCEDURE  11/9/2006       Social History  Tobacco:   History   Smoking Status     Former Smoker     Quit date: 11/17/2008   Smokeless Tobacco     Never Used    Alcohol:   Social History    Substance and Sexual Activity      Alcohol use: Never   Illicit Drugs:   History   Drug Use Unknown       Family History  Family History   Problem Relation Age of Onset     Cerebrovascular Disease Mother      Heart Disease Mother      Cancer Father      No Known Problems Sister      Other - See Comments Brother 77.00     No Known Problems Brother      No Known Problems Sister           Mela Choi MD on 8/26/2021      cc: Mayela Rayo

## 2021-08-27 ENCOUNTER — HOSPITAL ENCOUNTER (OUTPATIENT)
Dept: CARDIAC REHAB | Facility: HOSPITAL | Age: 66
End: 2021-08-27
Attending: INTERNAL MEDICINE
Payer: COMMERCIAL

## 2021-08-27 PROCEDURE — 93798 PHYS/QHP OP CAR RHAB W/ECG: CPT

## 2021-09-01 ENCOUNTER — HOSPITAL ENCOUNTER (OUTPATIENT)
Dept: CARDIAC REHAB | Facility: HOSPITAL | Age: 66
End: 2021-09-01
Attending: INTERNAL MEDICINE
Payer: COMMERCIAL

## 2021-09-01 PROCEDURE — 93798 PHYS/QHP OP CAR RHAB W/ECG: CPT

## 2021-09-03 ENCOUNTER — HOSPITAL ENCOUNTER (OUTPATIENT)
Dept: CARDIAC REHAB | Facility: HOSPITAL | Age: 66
End: 2021-09-03
Attending: INTERNAL MEDICINE
Payer: COMMERCIAL

## 2021-09-03 PROCEDURE — 93798 PHYS/QHP OP CAR RHAB W/ECG: CPT

## 2021-09-13 ENCOUNTER — HOSPITAL ENCOUNTER (OUTPATIENT)
Dept: CARDIAC REHAB | Facility: HOSPITAL | Age: 66
End: 2021-09-13
Attending: INTERNAL MEDICINE
Payer: COMMERCIAL

## 2021-09-13 PROCEDURE — 93798 PHYS/QHP OP CAR RHAB W/ECG: CPT

## 2021-09-15 ENCOUNTER — HOSPITAL ENCOUNTER (OUTPATIENT)
Dept: CARDIAC REHAB | Facility: HOSPITAL | Age: 66
End: 2021-09-15
Attending: INTERNAL MEDICINE
Payer: COMMERCIAL

## 2021-09-15 PROCEDURE — 93798 PHYS/QHP OP CAR RHAB W/ECG: CPT

## 2021-09-17 ENCOUNTER — HOSPITAL ENCOUNTER (OUTPATIENT)
Dept: CARDIAC REHAB | Facility: HOSPITAL | Age: 66
End: 2021-09-17
Attending: INTERNAL MEDICINE
Payer: COMMERCIAL

## 2021-09-17 PROCEDURE — 93798 PHYS/QHP OP CAR RHAB W/ECG: CPT

## 2021-09-22 ENCOUNTER — HOSPITAL ENCOUNTER (OUTPATIENT)
Dept: CARDIAC REHAB | Facility: HOSPITAL | Age: 66
End: 2021-09-22
Attending: INTERNAL MEDICINE
Payer: COMMERCIAL

## 2021-09-22 PROCEDURE — 93798 PHYS/QHP OP CAR RHAB W/ECG: CPT

## 2021-09-24 ENCOUNTER — HOSPITAL ENCOUNTER (OUTPATIENT)
Dept: CARDIAC REHAB | Facility: HOSPITAL | Age: 66
End: 2021-09-24
Attending: INTERNAL MEDICINE
Payer: COMMERCIAL

## 2021-09-24 PROCEDURE — 93798 PHYS/QHP OP CAR RHAB W/ECG: CPT

## 2021-09-27 ENCOUNTER — HOSPITAL ENCOUNTER (OUTPATIENT)
Dept: CARDIAC REHAB | Facility: HOSPITAL | Age: 66
End: 2021-09-27
Attending: INTERNAL MEDICINE
Payer: COMMERCIAL

## 2021-09-27 PROCEDURE — 93798 PHYS/QHP OP CAR RHAB W/ECG: CPT

## 2021-09-29 ENCOUNTER — HOSPITAL ENCOUNTER (OUTPATIENT)
Dept: CARDIAC REHAB | Facility: HOSPITAL | Age: 66
End: 2021-09-29
Attending: INTERNAL MEDICINE
Payer: COMMERCIAL

## 2021-09-29 PROCEDURE — 93798 PHYS/QHP OP CAR RHAB W/ECG: CPT

## 2021-10-01 ENCOUNTER — HOSPITAL ENCOUNTER (OUTPATIENT)
Dept: CARDIAC REHAB | Facility: HOSPITAL | Age: 66
End: 2021-10-01
Attending: INTERNAL MEDICINE
Payer: COMMERCIAL

## 2021-10-01 PROCEDURE — 93798 PHYS/QHP OP CAR RHAB W/ECG: CPT

## 2021-10-13 ENCOUNTER — HOSPITAL ENCOUNTER (OUTPATIENT)
Dept: CARDIAC REHAB | Facility: HOSPITAL | Age: 66
End: 2021-10-13
Attending: INTERNAL MEDICINE
Payer: COMMERCIAL

## 2021-10-13 PROCEDURE — 93798 PHYS/QHP OP CAR RHAB W/ECG: CPT

## 2021-10-15 ENCOUNTER — HOSPITAL ENCOUNTER (OUTPATIENT)
Dept: CARDIAC REHAB | Facility: HOSPITAL | Age: 66
End: 2021-10-15
Attending: INTERNAL MEDICINE
Payer: COMMERCIAL

## 2021-10-15 PROCEDURE — 93798 PHYS/QHP OP CAR RHAB W/ECG: CPT

## 2021-10-27 ENCOUNTER — HOSPITAL ENCOUNTER (OUTPATIENT)
Dept: CARDIAC REHAB | Facility: HOSPITAL | Age: 66
End: 2021-10-27
Attending: INTERNAL MEDICINE
Payer: COMMERCIAL

## 2021-10-27 PROCEDURE — 93798 PHYS/QHP OP CAR RHAB W/ECG: CPT

## 2021-10-27 PROCEDURE — 93797 PHYS/QHP OP CAR RHAB WO ECG: CPT

## 2021-11-10 ENCOUNTER — TELEPHONE (OUTPATIENT)
Dept: CARDIOLOGY | Facility: CLINIC | Age: 66
End: 2021-11-10
Payer: COMMERCIAL

## 2021-11-10 NOTE — TELEPHONE ENCOUNTER
----- Message from Tony Juárez MD sent at 11/10/2021  1:18 PM CST -----  Regarding: RE: TSB PATIENT  Given recent stent placement feel he can hold asa until after procedure but should have cholecystectomy on plavix  ----- Message -----  From: Chaparrita Mathur RN  Sent: 11/9/2021   2:03 PM CST  To: Tony Juárez MD  Subject: RE: TSB PATIENT                                  So, given the fact that the colonoscopy is elective, I can recommend waiting on that.  The Gall Bladder sx may not be as elective so if that is urgent what are your recommendations?    Thank you  Chaparrita   ----- Message -----  From: Tony Juárez MD  Sent: 11/9/2021   1:49 PM CST  To: Chaparrita Mathur RN  Subject: RE: TSB PATIENT                                  Looks like lad stents July so would continue plavix at least until January.  PTK  ----- Message -----  From: Chaparrita Mathur RN  Sent: 11/9/2021  12:39 PM CST  To: Tony Juárez MD  Subject: FW: TSB PATIENT                                  Dr. Juárez,  Can you offer recommendations for this patient who you have ordered the anti-platelet coverage for post PCI?    Thank you  Chaparrita  ----- Message -----  From: Alison Geiger  Sent: 11/8/2021   9:21 AM CST  To: Chaparrita Mathur RN  Subject: TSB PATIENT                                      General phone call:    Caller: PATIENT    Primary cardiologist: OPAL    Detailed reason for call: PATIENT HAVING A COLONOSCOPY On 11/22 AND ALSO NEEDS GALLBLADDER SURG, NOT SCHEDULED YET, PLEASE CALL AND ADVISE PATIENT RE; HOLDING clopidogrel (PLAVIX) 75 MG tablet, AND ASA.     Best phone number: [phone]    Best time to contact: ANY    Ok to leave a detailedmessage? YES    Device? NO    Additional Info:

## 2021-11-10 NOTE — TELEPHONE ENCOUNTER
Dr. Bertrand Malave, [facility] contacted with Dr. Juárez's recommendations to remain on the Plavix for his cholecystectomy, but may stop the aspirin until after the surgery. ([phone])   The patient was also given recommendations as per Dr. Juárez. He was advised that the surgeon's office will be in contact with him to discuss options.    Chaparrita Mathur RN BSN, CHFN

## 2021-11-22 ENCOUNTER — MEDICAL CORRESPONDENCE (OUTPATIENT)
Dept: ADMINISTRATIVE | Facility: CLINIC | Age: 66
End: 2021-11-22
Payer: COMMERCIAL

## 2021-12-05 ENCOUNTER — HEALTH MAINTENANCE LETTER (OUTPATIENT)
Age: 66
End: 2021-12-05

## 2022-02-01 ENCOUNTER — OFFICE VISIT (OUTPATIENT)
Dept: CARDIOLOGY | Facility: CLINIC | Age: 67
End: 2022-02-01
Payer: COMMERCIAL

## 2022-02-01 VITALS
SYSTOLIC BLOOD PRESSURE: 118 MMHG | HEART RATE: 84 BPM | BODY MASS INDEX: 31.98 KG/M2 | WEIGHT: 211 LBS | RESPIRATION RATE: 16 BRPM | HEIGHT: 68 IN | DIASTOLIC BLOOD PRESSURE: 72 MMHG

## 2022-02-01 DIAGNOSIS — I25.10 CORONARY ARTERY DISEASE INVOLVING NATIVE CORONARY ARTERY OF NATIVE HEART WITHOUT ANGINA PECTORIS: Primary | ICD-10-CM

## 2022-02-01 PROCEDURE — 99213 OFFICE O/P EST LOW 20 MIN: CPT | Performed by: INTERNAL MEDICINE

## 2022-02-01 ASSESSMENT — MIFFLIN-ST. JEOR: SCORE: 1706.59

## 2022-02-01 NOTE — PROGRESS NOTES
HEART CARE NOTE          Assessment/Recommendations     1. CAD  Assessment / Plan    S/p multiple stents with the most recent being PCI to the prox RCA, rPDA x2 with EDNA on 7/15/21    Patient denies any chest pain or anginal equivalents since intervention    Continue ASA, lisinopril, metoprolol succinate, rosuvastatin and clopidogrel     2. HLP  Assessment / Plan    Continue rosuvastatin    History of Present Illness/Subjective      Mr. Bertrand Palacios is a 64 y.o. male with a PMHx significant for  HLP (on simvastatin), CAD s/p PCI (most recent PCI to the LAD on 3/6/20) and multiple stents (on ASA, ACE-I, statin) who presents to Heart Care clinic for follow-up visit.     Today, Mr. Palacios endorses significantly improved functional capacity s/p recent stenting. He denies chest pain or anginal equivalents. He also denies chest pain, palpitations, orthopnea, PND, presyncope/syncope, lightheadedness, dizziness.       ECG: Personally reviewed. normal EKG, normal sinus rhythm, unchanged from previous tracings.     Coronary angiogram:    Prox RCA to Mid RCA lesion is 60% stenosed. Lesion successfully treated with 3.0 drug-eluting stent with wide patency    RPDA-1 lesion is 80% stenosed. Lesion successfully treated with 2.25 drug-eluting stent with wide patency    RPDA-2 lesion is 70% stenosed. Lesion successfully treated with 2.25 drug-eluting stent with wide patency    Previously stents in proximal and mid LAD are widely patent    Attempt to access right radial artery unsuccessful no complication    Right femoral access successful with closure using VIP Angio-Seal device without complication     Echo:  1. The left ventricle is normal in size. Left ventricular systolic performance is at the lower limits of normal. The ejection fraction is estimated to be 50%.    2. There is moderate distal lateral and distal anterior hypokinesis.  3. No significant valvular heart disease is identified on this study.   4. Normal right  "ventricular size and systolic performance.           Physical Examination Review of Systems   /72 (BP Location: Right arm, Patient Position: Sitting, Cuff Size: Adult Regular)   Pulse 84   Resp 16   Ht 1.727 m (5' 8\")   Wt 95.7 kg (211 lb)   BMI 32.08 kg/m    Body mass index is 32.08 kg/m .  Wt Readings from Last 3 Encounters:   02/01/22 95.7 kg (211 lb)   08/26/21 95.7 kg (211 lb)   07/29/21 96.2 kg (212 lb)     General Appearance:   no distress, normal body habitus   ENT/Mouth: membranes moist, no oral lesions or bleeding gums.      EYES:  no scleral icterus, normal conjunctivae   Neck: no carotid bruits or thyromegaly   Chest/Lungs:   lungs are clear to auscultation, no rales or wheezing, equal chest wall expansion    Cardiovascular:   Regular. Normal first and second heart sounds with no murmurs, rubs, or gallops; the carotid, radial and posterior tibial pulses are intact, no JVD or LE edema bilaterally    Abdomen:  no organomegaly, masses, bruits, or tenderness; bowel sounds are present   Extremities: no cyanosis or clubbing   Skin: no xanthelasma, warm.    Neurologic: normal gait, normal  bilateral, no tremors     Psychiatric: alert and oriented x3, calm     A complete 10 systems ROS was reviewed  And is negative except what is listed in the HPI.          Medical History  Surgical History Family History Social History   Past Medical History:   Diagnosis Date     Cancer (H)     bladder     Coronary artery disease     S/P PCI in 2006 Per H&P     Hiatal hernia     Per H&P     History of ASCVD     per H&P     Hypertension     Past Surgical History:   Procedure Laterality Date     BLADDER SURGERY  2008    tumor      CARDIAC CATHETERIZATION      2006 Per H&P     CV CORONARY ANGIOGRAM N/A 3/6/2020    Procedure: Coronary Angiogram;  Surgeon: Tony Juárez MD;  Location: [facility] Cath Lab;  Service: Cardiology     CV HEART CATHETERIZATION WITH POSSIBLE INTERVENTION N/A 7/15/2021    Procedure: " Coronary Angiogram;  Surgeon: Tony Juárez MD;  Location: [facility] CATH LAB CV     CV PCI N/A 7/15/2021    Procedure: PERCUTANEOUS CORONARY INTERVENTION;  Surgeon: Tony Juárez MD;  Location: [facility] CATH LAB CV     IR MISCELLANEOUS PROCEDURE  2006    no family history of premature coronary artery disease Social History     Socioeconomic History     Marital status:      Spouse name: Not on file     Number of children: Not on file     Years of education: Not on file     Highest education level: Not on file   Occupational History     Not on file   Tobacco Use     Smoking status: Former Smoker     Quit date: 2008     Years since quittin.2     Smokeless tobacco: Never Used   Substance and Sexual Activity     Alcohol use: Never     Drug use: Not Currently     Sexual activity: Not Currently   Other Topics Concern     Not on file   Social History Narrative     Not on file     Social Determinants of Health     Financial Resource Strain: Not on file   Food Insecurity: Not on file   Transportation Needs: Not on file   Physical Activity: Not on file   Stress: Not on file   Social Connections: Not on file   Intimate Partner Violence: Not on file   Housing Stability: Not on file           Lab Results    Chemistry/lipid CBC Cardiac Enzymes/BNP/TSH/INR   Lab Results   Component Value Date    CHOL 126 07/15/2021    HDL 42 07/15/2021    TRIG 136 07/15/2021    BUN 15 2021     2021    CO2 22 2021    Lab Results   Component Value Date    WBC 13.9 (H) 2021    HGB 15.7 2021    HCT 47.1 2021    MCV 88 2021     2021    Lab Results   Component Value Date    TROPONINI 0.08 2021     Lab Results   Component Value Date    TROPONINI 0.08 2021          Weight:    Wt Readings from Last 3 Encounters:   21 95.7 kg (211 lb)   21 96.2 kg (212 lb)   21 97.6 kg (215 lb 3.2 oz)       Allergies  Allergies   Allergen Reactions      Ticagrelor Shortness Of Breath     Isosorbide Headache         Surgical History  Past Surgical History:   Procedure Laterality Date     BLADDER SURGERY  2008    tumor      CARDIAC CATHETERIZATION      2006 Per H&P     CV CORONARY ANGIOGRAM N/A 3/6/2020    Procedure: Coronary Angiogram;  Surgeon: Tony Juárez MD;  Location: [facility] Cath Lab;  Service: Cardiology     CV HEART CATHETERIZATION WITH POSSIBLE INTERVENTION N/A 7/15/2021    Procedure: Coronary Angiogram;  Surgeon: Tony Juárez MD;  Location: [facility] CATH LAB CV     CV PCI N/A 7/15/2021    Procedure: PERCUTANEOUS CORONARY INTERVENTION;  Surgeon: Tony Juárez MD;  Location: [facility] LAB CV     IR MISCELLANEOUS PROCEDURE  11/9/2006       Social History  Tobacco:   History   Smoking Status     Former Smoker     Quit date: 11/17/2008   Smokeless Tobacco     Never Used    Alcohol:   Social History    Substance and Sexual Activity      Alcohol use: Never   Illicit Drugs:   History   Drug Use Unknown       Family History  Family History   Problem Relation Age of Onset     Cerebrovascular Disease Mother      Heart Disease Mother      Cancer Father      No Known Problems Sister      Other - See Comments Brother 77.00     No Known Problems Brother      No Known Problems Sister           Mela Choi MD on 2/1/2022      cc: Mayela Rayo

## 2022-02-01 NOTE — LETTER
2/1/2022    Mayela Rayo MD  [facility] [address]  [place] [facility] [address]    RE: Bertrand Palacios       Dear Colleague,     I had the pleasure of seeing Bertrand Palacios in the [facility].    HEART CARE NOTE          Assessment/Recommendations     1. CAD  Assessment / Plan    S/p multiple stents with the most recent being PCI to the prox RCA, rPDA x2 with EDNA on 7/15/21    Patient denies any chest pain or anginal equivalents since intervention    Continue ASA, lisinopril, metoprolol succinate, rosuvastatin and clopidogrel     2. HLP  Assessment / Plan    Continue rosuvastatin    History of Present Illness/Subjective      Mr. Bertrand Palacios is a 64 y.o. male with a PMHx significant for  HLP (on simvastatin), CAD s/p PCI (most recent PCI to the LAD on 3/6/20) and multiple stents (on ASA, ACE-I, statin) who presents to [facility] Care clinic for follow-up visit.     Today, Mr. Palacios endorses significantly improved functional capacity s/p recent stenting. He denies chest pain or anginal equivalents. He also denies chest pain, palpitations, orthopnea, PND, presyncope/syncope, lightheadedness, dizziness.       ECG: Personally reviewed. normal EKG, normal sinus rhythm, unchanged from previous tracings.     Coronary angiogram:    Prox RCA to Mid RCA lesion is 60% stenosed. Lesion successfully treated with 3.0 drug-eluting stent with wide patency    RPDA-1 lesion is 80% stenosed. Lesion successfully treated with 2.25 drug-eluting stent with wide patency    RPDA-2 lesion is 70% stenosed. Lesion successfully treated with 2.25 drug-eluting stent with wide patency    Previously stents in proximal and mid LAD are widely patent    Attempt to access right radial artery unsuccessful no complication    Right femoral access successful with closure using VIP Angio-Seal device without complication     Echo:  1. The left ventricle is normal in size. Left ventricular systolic performance is at  "the lower limits of normal. The ejection fraction is estimated to be 50%.    2. There is moderate distal lateral and distal anterior hypokinesis.  3. No significant valvular heart disease is identified on this study.   4. Normal right ventricular size and systolic performance.           Physical Examination Review of Systems   /72 (BP Location: Right arm, Patient Position: Sitting, Cuff Size: Adult Regular)   Pulse 84   Resp 16   Ht 1.727 m (5' 8\")   Wt 95.7 kg (211 lb)   BMI 32.08 kg/m    Body mass index is 32.08 kg/m .  Wt Readings from Last 3 Encounters:   02/01/22 95.7 kg (211 lb)   08/26/21 95.7 kg (211 lb)   07/29/21 96.2 kg (212 lb)     General Appearance:   no distress, normal body habitus   ENT/Mouth: membranes moist, no oral lesions or bleeding gums.      EYES:  no scleral icterus, normal conjunctivae   Neck: no carotid bruits or thyromegaly   Chest/Lungs:   lungs are clear to auscultation, no rales or wheezing, equal chest wall expansion    Cardiovascular:   Regular. Normal first and second heart sounds with no murmurs, rubs, or gallops; the carotid, radial and posterior tibial pulses are intact, no JVD or LE edema bilaterally    Abdomen:  no organomegaly, masses, bruits, or tenderness; bowel sounds are present   Extremities: no cyanosis or clubbing   Skin: no xanthelasma, warm.    Neurologic: normal gait, normal  bilateral, no tremors     Psychiatric: alert and oriented x3, calm     A complete 10 systems ROS was reviewed  And is negative except what is listed in the HPI.          Medical History  Surgical History Family History Social History   Past Medical History:   Diagnosis Date     Cancer (H)     bladder     Coronary artery disease     S/P PCI in 2006 Per H&P     Hiatal hernia     Per H&P     History of ASCVD     per H&P     Hypertension     Past Surgical History:   Procedure Laterality Date     BLADDER SURGERY  2008    tumor      CARDIAC CATHETERIZATION      2006 Per H&P     CV " CORONARY ANGIOGRAM N/A 3/6/2020    Procedure: Coronary Angiogram;  Surgeon: Tony Juárez MD;  Location: [facility] Cath Lab;  Service: Cardiology     CV HEART CATHETERIZATION WITH POSSIBLE INTERVENTION N/A 7/15/2021    Procedure: Coronary Angiogram;  Surgeon: Tony Juárez MD;  Location: [facility] CATH LAB CV     CV PCI N/A 7/15/2021    Procedure: PERCUTANEOUS CORONARY INTERVENTION;  Surgeon: Tony Juárez MD;  Location: [facility] CATH LAB CV     IR MISCELLANEOUS PROCEDURE  2006    no family history of premature coronary artery disease Social History     Socioeconomic History     Marital status:      Spouse name: Not on file     Number of children: Not on file     Years of education: Not on file     Highest education level: Not on file   Occupational History     Not on file   Tobacco Use     Smoking status: Former Smoker     Quit date: 2008     Years since quittin.2     Smokeless tobacco: Never Used   Substance and Sexual Activity     Alcohol use: Never     Drug use: Not Currently     Sexual activity: Not Currently   Other Topics Concern     Not on file   Social History Narrative     Not on file     Social Determinants of Health     Financial Resource Strain: Not on file   Food Insecurity: Not on file   Transportation Needs: Not on file   Physical Activity: Not on file   Stress: Not on file   Social Connections: Not on file   Intimate Partner Violence: Not on file   Housing Stability: Not on file           Lab Results    Chemistry/lipid CBC Cardiac Enzymes/BNP/TSH/INR   Lab Results   Component Value Date    CHOL 126 07/15/2021    HDL 42 07/15/2021    TRIG 136 07/15/2021    BUN 15 2021     2021    CO2 22 2021    Lab Results   Component Value Date    WBC 13.9 (H) 2021    HGB 15.7 2021    HCT 47.1 2021    MCV 88 2021     2021    Lab Results   Component Value Date    TROPONINI 0.08 2021     Lab Results   Component  Value Date    TROPONINI 0.08 07/17/2021          Weight:    Wt Readings from Last 3 Encounters:   08/26/21 95.7 kg (211 lb)   07/29/21 96.2 kg (212 lb)   07/17/21 97.6 kg (215 lb 3.2 oz)       Allergies  Allergies   Allergen Reactions     Ticagrelor Shortness Of Breath     Isosorbide Headache         Surgical History  Past Surgical History:   Procedure Laterality Date     BLADDER SURGERY  2008    tumor      CARDIAC CATHETERIZATION      2006 Per H&P     CV CORONARY ANGIOGRAM N/A 3/6/2020    Procedure: Coronary Angiogram;  Surgeon: Tony Juárez MD;  Location: [facility] Cath Lab;  Service: Cardiology     CV HEART CATHETERIZATION WITH POSSIBLE INTERVENTION N/A 7/15/2021    Procedure: Coronary Angiogram;  Surgeon: Tony Juárez MD;  Location: [facility] CATH LAB CV     CV PCI N/A 7/15/2021    Procedure: PERCUTANEOUS CORONARY INTERVENTION;  Surgeon: Tony Juárez MD;  Location: [facility] CATH LAB CV     IR MISCELLANEOUS PROCEDURE  11/9/2006       Social History  Tobacco:   History   Smoking Status     Former Smoker     Quit date: 11/17/2008   Smokeless Tobacco     Never Used    Alcohol:   Social History    Substance and Sexual Activity      Alcohol use: Never   Illicit Drugs:   History   Drug Use Unknown       Family History  Family History   Problem Relation Age of Onset     Cerebrovascular Disease Mother      Heart Disease Mother      Cancer Father      No Known Problems Sister      Other - See Comments Brother 77.00     No Known Problems Brother      No Known Problems Sister           Mela Choi MD on 2/1/2022      cc: Mayela Rayo      Thank you for allowing me to participate in the care of your patient.      Sincerely,     Mela Choi MD     [facility]  cc:   No referring provider defined for this encounter.

## 2022-02-10 ENCOUNTER — HOSPITAL ENCOUNTER (OUTPATIENT)
Dept: CARDIOLOGY | Facility: HOSPITAL | Age: 67
Discharge: HOME | End: 2022-02-10
Attending: INTERNAL MEDICINE | Admitting: INTERNAL MEDICINE
Payer: COMMERCIAL

## 2022-02-10 DIAGNOSIS — I25.10 CORONARY ARTERY DISEASE INVOLVING NATIVE CORONARY ARTERY OF NATIVE HEART WITHOUT ANGINA PECTORIS: ICD-10-CM

## 2022-02-10 PROCEDURE — 255N000002 HC RX 255 OP 636: Performed by: INTERNAL MEDICINE

## 2022-02-10 PROCEDURE — 93306 TTE W/DOPPLER COMPLETE: CPT | Mod: 26 | Performed by: INTERNAL MEDICINE

## 2022-02-10 PROCEDURE — 999N000208 ECHOCARDIOGRAM COMPLETE

## 2022-02-10 RX ADMIN — PERFLUTREN 2 ML: 6.52 INJECTION, SUSPENSION INTRAVENOUS at 09:35

## 2022-02-23 ENCOUNTER — TELEPHONE (OUTPATIENT)
Dept: CARDIOLOGY | Facility: CLINIC | Age: 67
End: 2022-02-23
Payer: COMMERCIAL

## 2022-02-23 NOTE — TELEPHONE ENCOUNTER
"Anand is calling today from [facility] to discuss patient Bertrand Palacios. She reports that he is being considered for gall bladder surgery soon at their facility.   Per Anand, \"The patient states that he was recently seen and cleared for this procedure by Dr. Choi and may be off the Plavix beginning 7 days prior to the procedure and then restart as per recommendations of the surgeon.\"      The notes below are from November conversations with Dr. Juárez regarding this procedure, given the recent PCI.    ----- Message -----  From: Chaparrita Mathur RN  Sent: 11/9/2021   2:03 PM CST  To: Tony Juárez MD  Subject: RE: TSB PATIENT                                   So, given the fact that the colonoscopy is elective, I can recommend waiting on that.  The Gall Bladder sx may not be as elective so if that is urgent what are your recommendations?     Thank you  Chaparrita     ----- Message -----  From: Tony Juárez MD  Sent: 11/9/2021   1:49 PM CST  To: Chaparrita Mathur RN  Subject: RE: TSB PATIENT                                   Looks like lad stents July so would continue plavix at least until January.  PTK  ----- Message -----  From: Chaparrita Mathur RN  Sent: 11/9/2021  12:39 PM CST  To: Tony Juárez MD  Subject: FW: TSB PATIENT                                   Dr. Juárez,  Can you offer recommendations for this patient who you have ordered the anti-platelet coverage for post PCI?     Thank you  Chaparrita  ----- Message     Dr. Choi,   Are you in agreement with the statement as above by the [facility] Nurse that the patient may hold Plavix x 7 days prior to his gall bladder surgery and resume post procedure at the discretion of surgeon??    Chaparrita Mathur RN BSN, CHFN      "

## 2022-02-24 NOTE — TELEPHONE ENCOUNTER
Ana Choi-MD Kevan Winslow Shelly A, RN  Caller: Unspecified (Yesterday,  2:48 PM)  Thanks Chaparrita. He can hold clopidogrel x7 days and resume ASAP. Please have him continue ASA indefinitely without interruption.     ~ Ana       Patient is contacted today to let him know that the above information was communicated.   Also faxed to [facility] [place] to the Attention of Anand Mathur RN BSN, CHFN

## 2022-05-22 ENCOUNTER — HEALTH MAINTENANCE LETTER (OUTPATIENT)
Age: 67
End: 2022-05-22

## 2022-09-25 ENCOUNTER — HEALTH MAINTENANCE LETTER (OUTPATIENT)
Age: 67
End: 2022-09-25

## 2023-01-30 ENCOUNTER — HEALTH MAINTENANCE LETTER (OUTPATIENT)
Age: 68
End: 2023-01-30

## 2023-03-03 ENCOUNTER — OFFICE VISIT (OUTPATIENT)
Dept: CARDIOLOGY | Facility: CLINIC | Age: 68
End: 2023-03-03
Payer: COMMERCIAL

## 2023-03-03 VITALS
RESPIRATION RATE: 16 BRPM | HEIGHT: 68 IN | WEIGHT: 218 LBS | DIASTOLIC BLOOD PRESSURE: 62 MMHG | BODY MASS INDEX: 33.04 KG/M2 | SYSTOLIC BLOOD PRESSURE: 112 MMHG | HEART RATE: 76 BPM

## 2023-03-03 DIAGNOSIS — I25.10 CORONARY ARTERY DISEASE INVOLVING NATIVE CORONARY ARTERY OF NATIVE HEART WITHOUT ANGINA PECTORIS: Primary | ICD-10-CM

## 2023-03-03 PROCEDURE — 99213 OFFICE O/P EST LOW 20 MIN: CPT | Performed by: INTERNAL MEDICINE

## 2023-03-03 NOTE — PROGRESS NOTES
HEART CARE NOTE          Assessment/Recommendations     1. CAD  Assessment / Plan  S/p multiple stents with the most recent being PCI to the prox RCA, rPDA x2 with EDNA on 7/15/21  Patient denies any chest pain or anginal equivalents since intervention  Continue ASA, lisinopril, metoprolol succinate, rosuvastatin; cam hold clopidogrel moving forward     2. HLP  Assessment / Plan  Continue rosuvastatin    History of Present Illness/Subjective      Mr. Bertrand Palacios is a 64 y.o. male with a PMHx significant for  HLP (on simvastatin), CAD s/p PCI (most recent PCI to the LAD on 3/6/20) and multiple stents (on ASA, ACE-I, statin) who presents to Heart Care clinic for follow-up visit.     Today, Mr. Palacios denies any acute cardiac events or complaints; Management plan as detailed above.       ECG: Personally reviewed. normal EKG, normal sinus rhythm, unchanged from previous tracings.     Coronary angiogram:  Prox RCA to Mid RCA lesion is 60% stenosed. Lesion successfully treated with 3.0 drug-eluting stent with wide patency  RPDA-1 lesion is 80% stenosed. Lesion successfully treated with 2.25 drug-eluting stent with wide patency  RPDA-2 lesion is 70% stenosed. Lesion successfully treated with 2.25 drug-eluting stent with wide patency  Previously stents in proximal and mid LAD are widely patent  Attempt to access right radial artery unsuccessful no complication  Right femoral access successful with closure using VIP Angio-Seal device without complication     Echo:  The left ventricle is normal in size. Left ventricular systolic performance is at the lower limits of normal. The ejection fraction is estimated to be 50%.    There is moderate distal lateral and distal anterior hypokinesis.  No significant valvular heart disease is identified on this study.   Normal right ventricular size and systolic performance.           Physical Examination Review of Systems   /62 (BP Location: Left arm, Patient Position: Sitting,  "Cuff Size: Adult Regular)   Pulse 76   Resp 16   Ht 1.727 m (5' 8\")   Wt 98.9 kg (218 lb)   BMI 33.15 kg/m    Body mass index is 33.15 kg/m .  Wt Readings from Last 3 Encounters:   03/03/23 98.9 kg (218 lb)   02/01/22 95.7 kg (211 lb)   08/26/21 95.7 kg (211 lb)     General Appearance:   no distress, normal body habitus   ENT/Mouth: membranes moist, no oral lesions or bleeding gums.      EYES:  no scleral icterus, normal conjunctivae   Neck: no carotid bruits or thyromegaly   Chest/Lungs:   lungs are clear to auscultation, no rales or wheezing, equal chest wall expansion    Cardiovascular:   Regular. Normal first and second heart sounds with no murmurs, rubs, or gallops; the carotid, radial and posterior tibial pulses are intact, no JVD or LE edema bilaterally    Abdomen:  no organomegaly, masses, bruits, or tenderness; bowel sounds are present   Extremities: no cyanosis or clubbing   Skin: no xanthelasma, warm.    Neurologic: normal gait, normal  bilateral, no tremors     Psychiatric: alert and oriented x3, calm     A complete 10 systems ROS was reviewed  And is negative except what is listed in the HPI.          Medical History  Surgical History Family History Social History   Past Medical History:   Diagnosis Date     Cancer (H)     bladder     Coronary artery disease     S/P PCI in 2006 Per H&P     Hiatal hernia     Per H&P     History of ASCVD     per H&P     Hypertension     Past Surgical History:   Procedure Laterality Date     BLADDER SURGERY  2008    tumor      CARDIAC CATHETERIZATION      2006 Per H&P     CV CORONARY ANGIOGRAM N/A 3/6/2020    Procedure: Coronary Angiogram;  Surgeon: Tony Juárez MD;  Location: [facility] Cath Lab;  Service: Cardiology     CV HEART CATHETERIZATION WITH POSSIBLE INTERVENTION N/A 7/15/2021    Procedure: Coronary Angiogram;  Surgeon: Tony Juárez MD;  Location: [facility] CATH LAB CV     CV PCI N/A 7/15/2021    Procedure: PERCUTANEOUS CORONARY INTERVENTION; "  Surgeon: Tony Juárez MD;  Location: [facility] CV     IR MISCELLANEOUS PROCEDURE  2006    no family history of premature coronary artery disease Social History     Socioeconomic History     Marital status:      Spouse name: Not on file     Number of children: Not on file     Years of education: Not on file     Highest education level: Not on file   Occupational History     Not on file   Tobacco Use     Smoking status: Former     Types: Cigarettes     Quit date: 2008     Years since quittin.2     Smokeless tobacco: Never   Substance and Sexual Activity     Alcohol use: Never     Drug use: Not Currently     Sexual activity: Not Currently   Other Topics Concern     Not on file   Social History Narrative     Not on file     Social Determinants of Health     Financial Resource Strain: Not on file   Food Insecurity: Not on file   Transportation Needs: Not on file   Physical Activity: Not on file   Stress: Not on file   Social Connections: Not on file   Intimate Partner Violence: Not on file   Housing Stability: Not on file           Lab Results    Chemistry/lipid CBC Cardiac Enzymes/BNP/TSH/INR   Lab Results   Component Value Date    CHOL 126 07/15/2021    HDL 42 07/15/2021    TRIG 136 07/15/2021    BUN 15 2021     2021    CO2 22 2021    Lab Results   Component Value Date    WBC 13.9 (H) 2021    HGB 15.7 2021    HCT 47.1 2021    MCV 88 2021     2021    Lab Results   Component Value Date    TROPONINI 0.08 2021     Lab Results   Component Value Date    TROPONINI 0.08 2021          Weight:    Wt Readings from Last 3 Encounters:   23 98.9 kg (218 lb)   22 95.7 kg (211 lb)   21 95.7 kg (211 lb)       Allergies  Allergies   Allergen Reactions     Ticagrelor Shortness Of Breath     Isosorbide Headache         Surgical History  Past Surgical History:   Procedure Laterality Date     BLADDER SURGERY       tumor      CARDIAC CATHETERIZATION      2006 Per H&P     CV CORONARY ANGIOGRAM N/A 3/6/2020    Procedure: Coronary Angiogram;  Surgeon: Tony Juárez MD;  Location: [facility] Cath Lab;  Service: Cardiology     CV HEART CATHETERIZATION WITH POSSIBLE INTERVENTION N/A 7/15/2021    Procedure: Coronary Angiogram;  Surgeon: Tony Juárez MD;  Location: [facility] CATH LAB CV     CV PCI N/A 7/15/2021    Procedure: PERCUTANEOUS CORONARY INTERVENTION;  Surgeon: Tony Juárez MD;  Location: [facility] CV     IR MISCELLANEOUS PROCEDURE  11/9/2006       Social History  Tobacco:   History   Smoking Status     Former     Types: Cigarettes     Quit date: 11/17/2008   Smokeless Tobacco     Never    Alcohol:   Social History    Substance and Sexual Activity      Alcohol use: Never   Illicit Drugs:   History   Drug Use Unknown       Family History  Family History   Problem Relation Age of Onset     Cerebrovascular Disease Mother      Heart Disease Mother      Cancer Father      No Known Problems Sister      Other - See Comments Brother 77.00     No Known Problems Brother      No Known Problems Sister           Mela Choi MD on 3/3/2023      cc: Mayela Rayo

## 2023-03-03 NOTE — LETTER
3/3/2023    Mayela Rayo MD  [facility] [address]  [place] [address]    RE: Bertrand Palacios       Dear Colleague,     I had the pleasure of seeing Bertrand Palacios in the [facility].    HEART CARE NOTE          Assessment/Recommendations     1. CAD  Assessment / Plan    S/p multiple stents with the most recent being PCI to the prox RCA, rPDA x2 with EDNA on 7/15/21    Patient denies any chest pain or anginal equivalents since intervention    Continue ASA, lisinopril, metoprolol succinate, rosuvastatin; cam hold clopidogrel moving forward     2. HLP  Assessment / Plan    Continue rosuvastatin    History of Present Illness/Subjective      Mr. Bertrand Palacios is a 64 y.o. male with a PMHx significant for  HLP (on simvastatin), CAD s/p PCI (most recent PCI to the LAD on 3/6/20) and multiple stents (on ASA, ACE-I, statin) who presents to Heart Care clinic for follow-up visit.     Today, Mr. Palacios denies any acute cardiac events or complaints; Management plan as detailed above.       ECG: Personally reviewed. normal EKG, normal sinus rhythm, unchanged from previous tracings.     Coronary angiogram:    Prox RCA to Mid RCA lesion is 60% stenosed. Lesion successfully treated with 3.0 drug-eluting stent with wide patency    RPDA-1 lesion is 80% stenosed. Lesion successfully treated with 2.25 drug-eluting stent with wide patency    RPDA-2 lesion is 70% stenosed. Lesion successfully treated with 2.25 drug-eluting stent with wide patency    Previously stents in proximal and mid LAD are widely patent    Attempt to access right radial artery unsuccessful no complication    Right femoral access successful with closure using VIP Angio-Seal device without complication     Echo:  1. The left ventricle is normal in size. Left ventricular systolic performance is at the lower limits of normal. The ejection fraction is estimated to be 50%.    2. There is moderate distal lateral and  "distal anterior hypokinesis.  3. No significant valvular heart disease is identified on this study.   4. Normal right ventricular size and systolic performance.           Physical Examination Review of Systems   /62 (BP Location: Left arm, Patient Position: Sitting, Cuff Size: Adult Regular)   Pulse 76   Resp 16   Ht 1.727 m (5' 8\")   Wt 98.9 kg (218 lb)   BMI 33.15 kg/m    Body mass index is 33.15 kg/m .  Wt Readings from Last 3 Encounters:   03/03/23 98.9 kg (218 lb)   02/01/22 95.7 kg (211 lb)   08/26/21 95.7 kg (211 lb)     General Appearance:   no distress, normal body habitus   ENT/Mouth: membranes moist, no oral lesions or bleeding gums.      EYES:  no scleral icterus, normal conjunctivae   Neck: no carotid bruits or thyromegaly   Chest/Lungs:   lungs are clear to auscultation, no rales or wheezing, equal chest wall expansion    Cardiovascular:   Regular. Normal first and second heart sounds with no murmurs, rubs, or gallops; the carotid, radial and posterior tibial pulses are intact, no JVD or LE edema bilaterally    Abdomen:  no organomegaly, masses, bruits, or tenderness; bowel sounds are present   Extremities: no cyanosis or clubbing   Skin: no xanthelasma, warm.    Neurologic: normal gait, normal  bilateral, no tremors     Psychiatric: alert and oriented x3, calm     A complete 10 systems ROS was reviewed  And is negative except what is listed in the HPI.          Medical History  Surgical History Family History Social History   Past Medical History:   Diagnosis Date     Cancer (H)     bladder     Coronary artery disease     S/P PCI in 2006 Per H&P     Hiatal hernia     Per H&P     History of ASCVD     per H&P     Hypertension     Past Surgical History:   Procedure Laterality Date     BLADDER SURGERY  2008    tumor      CARDIAC CATHETERIZATION      2006 Per H&P     CV CORONARY ANGIOGRAM N/A 3/6/2020    Procedure: Coronary Angiogram;  Surgeon: Tony Juárez MD;  Location: [facility]" Cath Lab;  Service: Cardiology     CV HEART CATHETERIZATION WITH POSSIBLE INTERVENTION N/A 7/15/2021    Procedure: Coronary Angiogram;  Surgeon: Tony Juárez MD;  Location: [facility] CATH LAB CV     CV PCI N/A 7/15/2021    Procedure: PERCUTANEOUS CORONARY INTERVENTION;  Surgeon: Tony Juárez MD;  Location: [facility] CATH LAB CV     IR MISCELLANEOUS PROCEDURE  2006    no family history of premature coronary artery disease Social History     Socioeconomic History     Marital status:      Spouse name: Not on file     Number of children: Not on file     Years of education: Not on file     Highest education level: Not on file   Occupational History     Not on file   Tobacco Use     Smoking status: Former     Types: Cigarettes     Quit date: 2008     Years since quittin.2     Smokeless tobacco: Never   Substance and Sexual Activity     Alcohol use: Never     Drug use: Not Currently     Sexual activity: Not Currently   Other Topics Concern     Not on file   Social History Narrative     Not on file     Social Determinants of Health     Financial Resource Strain: Not on file   Food Insecurity: Not on file   Transportation Needs: Not on file   Physical Activity: Not on file   Stress: Not on file   Social Connections: Not on file   Intimate Partner Violence: Not on file   Housing Stability: Not on file           Lab Results    Chemistry/lipid CBC Cardiac Enzymes/BNP/TSH/INR   Lab Results   Component Value Date    CHOL 126 07/15/2021    HDL 42 07/15/2021    TRIG 136 07/15/2021    BUN 15 2021     2021    CO2 22 2021    Lab Results   Component Value Date    WBC 13.9 (H) 2021    HGB 15.7 2021    HCT 47.1 2021    MCV 88 2021     2021    Lab Results   Component Value Date    TROPONINI 0.08 2021     Lab Results   Component Value Date    TROPONINI 0.08 2021          Weight:    Wt Readings from Last 3 Encounters:   23 98.9 kg  (218 lb)   02/01/22 95.7 kg (211 lb)   08/26/21 95.7 kg (211 lb)       Allergies  Allergies   Allergen Reactions     Ticagrelor Shortness Of Breath     Isosorbide Headache         Surgical History  Past Surgical History:   Procedure Laterality Date     BLADDER SURGERY  2008    tumor      CARDIAC CATHETERIZATION      2006 Per H&P     CV CORONARY ANGIOGRAM N/A 3/6/2020    Procedure: Coronary Angiogram;  Surgeon: Tony Juárez MD;  Location: [facility] Cath Lab;  Service: Cardiology     CV HEART CATHETERIZATION WITH POSSIBLE INTERVENTION N/A 7/15/2021    Procedure: Coronary Angiogram;  Surgeon: Tony Juárez MD;  Location: [facility] CATH LAB CV     CV PCI N/A 7/15/2021    Procedure: PERCUTANEOUS CORONARY INTERVENTION;  Surgeon: Tony Juárez MD;  Location: [facility] CATH LAB CV     IR MISCELLANEOUS PROCEDURE  11/9/2006       Social History  Tobacco:   History   Smoking Status     Former     Types: Cigarettes     Quit date: 11/17/2008   Smokeless Tobacco     Never    Alcohol:   Social History    Substance and Sexual Activity      Alcohol use: Never   Illicit Drugs:   History   Drug Use Unknown       Family History  Family History   Problem Relation Age of Onset     Cerebrovascular Disease Mother      Heart Disease Mother      Cancer Father      No Known Problems Sister      Other - See Comments Brother 77.00     No Known Problems Brother      No Known Problems Sister           Mela Choi MD on 3/3/2023      cc: Mayela Rayo      Thank you for allowing me to participate in the care of your patient.      Sincerely,     Mela Choi MD     [facility]  cc:   No referring provider defined for this encounter.

## 2024-01-24 ENCOUNTER — HOSPITAL ENCOUNTER (OUTPATIENT)
Facility: HOSPITAL | Age: 69
Setting detail: OBSERVATION
Discharge: HOME | End: 2024-01-25
Attending: EMERGENCY MEDICINE | Admitting: INTERNAL MEDICINE
Payer: COMMERCIAL

## 2024-01-24 ENCOUNTER — APPOINTMENT (OUTPATIENT)
Dept: CT IMAGING | Facility: HOSPITAL | Age: 69
End: 2024-01-24
Attending: EMERGENCY MEDICINE
Payer: COMMERCIAL

## 2024-01-24 DIAGNOSIS — I10 ESSENTIAL HYPERTENSION, BENIGN: ICD-10-CM

## 2024-01-24 DIAGNOSIS — E78.5 DYSLIPIDEMIA, GOAL LDL BELOW 70: ICD-10-CM

## 2024-01-24 DIAGNOSIS — I20.0 UNSTABLE ANGINA (H): Primary | ICD-10-CM

## 2024-01-24 DIAGNOSIS — R07.9 CHEST PAIN, UNSPECIFIED TYPE: ICD-10-CM

## 2024-01-24 PROBLEM — C67.9 BLADDER CANCER (H): Status: ACTIVE | Noted: 2024-01-24

## 2024-01-24 PROBLEM — I25.10 CAD (CORONARY ARTERY DISEASE): Status: ACTIVE | Noted: 2020-02-29

## 2024-01-24 LAB
ALBUMIN SERPL BCG-MCNC: 3.7 G/DL (ref 3.5–5.2)
ALP SERPL-CCNC: 78 U/L (ref 40–150)
ALT SERPL W P-5'-P-CCNC: 22 U/L (ref 0–70)
ANION GAP SERPL CALCULATED.3IONS-SCNC: 10 MMOL/L (ref 7–15)
AST SERPL W P-5'-P-CCNC: 22 U/L (ref 0–45)
BASOPHILS # BLD AUTO: 0.1 10E3/UL (ref 0–0.2)
BASOPHILS NFR BLD AUTO: 1 %
BILIRUB SERPL-MCNC: 0.3 MG/DL
BUN SERPL-MCNC: 17.7 MG/DL (ref 8–23)
CALCIUM SERPL-MCNC: 8.5 MG/DL (ref 8.8–10.2)
CHLORIDE SERPL-SCNC: 110 MMOL/L (ref 98–107)
CREAT SERPL-MCNC: 0.89 MG/DL (ref 0.67–1.17)
DEPRECATED HCO3 PLAS-SCNC: 23 MMOL/L (ref 22–29)
EGFRCR SERPLBLD CKD-EPI 2021: >90 ML/MIN/1.73M2
EOSINOPHIL # BLD AUTO: 0.4 10E3/UL (ref 0–0.7)
EOSINOPHIL NFR BLD AUTO: 4 %
ERYTHROCYTE [DISTWIDTH] IN BLOOD BY AUTOMATED COUNT: 13.6 % (ref 10–15)
GLUCOSE SERPL-MCNC: 130 MG/DL (ref 70–99)
HCT VFR BLD AUTO: 48.1 % (ref 40–53)
HGB BLD-MCNC: 16.3 G/DL (ref 13.3–17.7)
IMM GRANULOCYTES # BLD: 0 10E3/UL
IMM GRANULOCYTES NFR BLD: 0 %
LIPASE SERPL-CCNC: 28 U/L (ref 13–60)
LYMPHOCYTES # BLD AUTO: 3.1 10E3/UL (ref 0.8–5.3)
LYMPHOCYTES NFR BLD AUTO: 32 %
MCH RBC QN AUTO: 29.6 PG (ref 26.5–33)
MCHC RBC AUTO-ENTMCNC: 33.9 G/DL (ref 31.5–36.5)
MCV RBC AUTO: 88 FL (ref 78–100)
MONOCYTES # BLD AUTO: 1.1 10E3/UL (ref 0–1.3)
MONOCYTES NFR BLD AUTO: 12 %
NEUTROPHILS # BLD AUTO: 5 10E3/UL (ref 1.6–8.3)
NEUTROPHILS NFR BLD AUTO: 51 %
NRBC # BLD AUTO: 0 10E3/UL
NRBC BLD AUTO-RTO: 0 /100
NT-PROBNP SERPL-MCNC: <36 PG/ML (ref 0–900)
PLATELET # BLD AUTO: 210 10E3/UL (ref 150–450)
POTASSIUM SERPL-SCNC: 4.1 MMOL/L (ref 3.4–5.3)
PROT SERPL-MCNC: 6.5 G/DL (ref 6.4–8.3)
RBC # BLD AUTO: 5.5 10E6/UL (ref 4.4–5.9)
SODIUM SERPL-SCNC: 143 MMOL/L (ref 135–145)
TROPONIN T SERPL HS-MCNC: 7 NG/L
TROPONIN T SERPL HS-MCNC: <6 NG/L
WBC # BLD AUTO: 9.7 10E3/UL (ref 4–11)

## 2024-01-24 PROCEDURE — 84484 ASSAY OF TROPONIN QUANT: CPT | Performed by: EMERGENCY MEDICINE

## 2024-01-24 PROCEDURE — 74174 CTA ABD&PLVS W/CONTRAST: CPT | Mod: MG

## 2024-01-24 PROCEDURE — 83690 ASSAY OF LIPASE: CPT | Performed by: EMERGENCY MEDICINE

## 2024-01-24 PROCEDURE — 85025 COMPLETE CBC W/AUTO DIFF WBC: CPT | Performed by: EMERGENCY MEDICINE

## 2024-01-24 PROCEDURE — 250N000013 HC RX MED GY IP 250 OP 250 PS 637: Performed by: EMERGENCY MEDICINE

## 2024-01-24 PROCEDURE — 99285 EMERGENCY DEPT VISIT HI MDM: CPT | Mod: 25

## 2024-01-24 PROCEDURE — 250N000011 HC RX IP 250 OP 636: Performed by: EMERGENCY MEDICINE

## 2024-01-24 PROCEDURE — 83880 ASSAY OF NATRIURETIC PEPTIDE: CPT | Performed by: EMERGENCY MEDICINE

## 2024-01-24 PROCEDURE — 93005 ELECTROCARDIOGRAM TRACING: CPT | Performed by: EMERGENCY MEDICINE

## 2024-01-24 PROCEDURE — 80053 COMPREHEN METABOLIC PANEL: CPT | Performed by: EMERGENCY MEDICINE

## 2024-01-24 PROCEDURE — G0378 HOSPITAL OBSERVATION PER HR: HCPCS

## 2024-01-24 PROCEDURE — 36415 COLL VENOUS BLD VENIPUNCTURE: CPT | Performed by: EMERGENCY MEDICINE

## 2024-01-24 RX ORDER — ONDANSETRON 4 MG/1
4 TABLET, ORALLY DISINTEGRATING ORAL EVERY 6 HOURS PRN
Status: DISCONTINUED | OUTPATIENT
Start: 2024-01-24 | End: 2024-01-25 | Stop reason: HOSPADM

## 2024-01-24 RX ORDER — LISINOPRIL 2.5 MG/1
2.5 TABLET ORAL DAILY
Status: DISCONTINUED | OUTPATIENT
Start: 2024-01-25 | End: 2024-01-25 | Stop reason: HOSPADM

## 2024-01-24 RX ORDER — ROSUVASTATIN CALCIUM 10 MG/1
20 TABLET, COATED ORAL DAILY
Status: DISCONTINUED | OUTPATIENT
Start: 2024-01-25 | End: 2024-01-25

## 2024-01-24 RX ORDER — AMOXICILLIN 250 MG
1 CAPSULE ORAL 2 TIMES DAILY PRN
Status: DISCONTINUED | OUTPATIENT
Start: 2024-01-24 | End: 2024-01-25

## 2024-01-24 RX ORDER — GABAPENTIN 100 MG/1
100 CAPSULE ORAL 3 TIMES DAILY PRN
Status: DISCONTINUED | OUTPATIENT
Start: 2024-01-24 | End: 2024-01-25 | Stop reason: HOSPADM

## 2024-01-24 RX ORDER — PANTOPRAZOLE SODIUM 40 MG/1
40 TABLET, DELAYED RELEASE ORAL
Status: DISCONTINUED | OUTPATIENT
Start: 2024-01-25 | End: 2024-01-25 | Stop reason: HOSPADM

## 2024-01-24 RX ORDER — ASPIRIN 81 MG/1
244 TABLET, CHEWABLE ORAL ONCE
Status: COMPLETED | OUTPATIENT
Start: 2024-01-24 | End: 2024-01-24

## 2024-01-24 RX ORDER — VITAMIN B COMPLEX
CAPSULE ORAL DAILY
Status: DISCONTINUED | OUTPATIENT
Start: 2024-01-25 | End: 2024-01-24

## 2024-01-24 RX ORDER — CLOPIDOGREL BISULFATE 75 MG/1
75 TABLET ORAL DAILY
Status: DISCONTINUED | OUTPATIENT
Start: 2024-01-25 | End: 2024-01-25 | Stop reason: HOSPADM

## 2024-01-24 RX ORDER — NITROGLYCERIN 0.4 MG/1
0.4 TABLET SUBLINGUAL ONCE
Status: COMPLETED | OUTPATIENT
Start: 2024-01-24 | End: 2024-01-24

## 2024-01-24 RX ORDER — AMOXICILLIN 250 MG
2 CAPSULE ORAL 2 TIMES DAILY PRN
Status: DISCONTINUED | OUTPATIENT
Start: 2024-01-24 | End: 2024-01-25

## 2024-01-24 RX ORDER — METOPROLOL SUCCINATE 50 MG/1
50 TABLET, EXTENDED RELEASE ORAL DAILY
Status: DISCONTINUED | OUTPATIENT
Start: 2024-01-25 | End: 2024-01-25 | Stop reason: HOSPADM

## 2024-01-24 RX ORDER — ONDANSETRON 2 MG/ML
4 INJECTION INTRAMUSCULAR; INTRAVENOUS EVERY 6 HOURS PRN
Status: DISCONTINUED | OUTPATIENT
Start: 2024-01-24 | End: 2024-01-25 | Stop reason: HOSPADM

## 2024-01-24 RX ORDER — LATANOPROST 50 UG/ML
1 SOLUTION/ DROPS OPHTHALMIC AT BEDTIME
COMMUNITY
Start: 2023-11-30

## 2024-01-24 RX ORDER — AMOXICILLIN 250 MG
1 CAPSULE ORAL 2 TIMES DAILY PRN
Status: DISCONTINUED | OUTPATIENT
Start: 2024-01-24 | End: 2024-01-25 | Stop reason: HOSPADM

## 2024-01-24 RX ORDER — ASPIRIN 81 MG/1
81 TABLET ORAL DAILY
Status: DISCONTINUED | OUTPATIENT
Start: 2024-01-25 | End: 2024-01-25

## 2024-01-24 RX ORDER — AMOXICILLIN 250 MG
2 CAPSULE ORAL 2 TIMES DAILY PRN
Status: DISCONTINUED | OUTPATIENT
Start: 2024-01-24 | End: 2024-01-25 | Stop reason: HOSPADM

## 2024-01-24 RX ORDER — ONDANSETRON 2 MG/ML
4 INJECTION INTRAMUSCULAR; INTRAVENOUS EVERY 6 HOURS PRN
Status: DISCONTINUED | OUTPATIENT
Start: 2024-01-24 | End: 2024-01-25

## 2024-01-24 RX ORDER — CALCIUM CARBONATE 500 MG/1
1000 TABLET, CHEWABLE ORAL 4 TIMES DAILY PRN
Status: DISCONTINUED | OUTPATIENT
Start: 2024-01-24 | End: 2024-01-25 | Stop reason: HOSPADM

## 2024-01-24 RX ORDER — LIDOCAINE 40 MG/G
CREAM TOPICAL
Status: DISCONTINUED | OUTPATIENT
Start: 2024-01-24 | End: 2024-01-25 | Stop reason: HOSPADM

## 2024-01-24 RX ORDER — IOPAMIDOL 755 MG/ML
90 INJECTION, SOLUTION INTRAVASCULAR ONCE
Status: COMPLETED | OUTPATIENT
Start: 2024-01-24 | End: 2024-01-24

## 2024-01-24 RX ORDER — ONDANSETRON 4 MG/1
4 TABLET, ORALLY DISINTEGRATING ORAL EVERY 6 HOURS PRN
Status: DISCONTINUED | OUTPATIENT
Start: 2024-01-24 | End: 2024-01-25

## 2024-01-24 RX ORDER — VITAMIN B COMPLEX
25 TABLET ORAL DAILY
Status: DISCONTINUED | OUTPATIENT
Start: 2024-01-25 | End: 2024-01-25 | Stop reason: HOSPADM

## 2024-01-24 RX ORDER — CLOPIDOGREL BISULFATE 75 MG/1
75 TABLET ORAL DAILY
COMMUNITY
Start: 2023-07-06

## 2024-01-24 RX ORDER — LATANOPROST 50 UG/ML
1 SOLUTION/ DROPS OPHTHALMIC AT BEDTIME
Status: DISCONTINUED | OUTPATIENT
Start: 2024-01-25 | End: 2024-01-25 | Stop reason: HOSPADM

## 2024-01-24 RX ORDER — NITROGLYCERIN 0.4 MG/1
0.4 TABLET SUBLINGUAL EVERY 5 MIN PRN
Status: DISCONTINUED | OUTPATIENT
Start: 2024-01-24 | End: 2024-01-25 | Stop reason: HOSPADM

## 2024-01-24 RX ADMIN — ASPIRIN 81 MG CHEWABLE TABLET 244 MG: 81 TABLET CHEWABLE at 22:06

## 2024-01-24 RX ADMIN — IOPAMIDOL 90 ML: 755 INJECTION, SOLUTION INTRAVENOUS at 21:33

## 2024-01-24 RX ADMIN — NITROGLYCERIN 0.4 MG: 0.4 TABLET, ORALLY DISINTEGRATING SUBLINGUAL at 22:06

## 2024-01-24 ASSESSMENT — ACTIVITIES OF DAILY LIVING (ADL)
ADLS_ACUITY_SCORE: 35
ADLS_ACUITY_SCORE: 35

## 2024-01-25 VITALS
DIASTOLIC BLOOD PRESSURE: 65 MMHG | WEIGHT: 215 LBS | HEIGHT: 68 IN | OXYGEN SATURATION: 96 % | BODY MASS INDEX: 32.58 KG/M2 | TEMPERATURE: 97.9 F | HEART RATE: 69 BPM | RESPIRATION RATE: 20 BRPM | SYSTOLIC BLOOD PRESSURE: 140 MMHG

## 2024-01-25 PROBLEM — I20.0 UNSTABLE ANGINA (H): Status: ACTIVE | Noted: 2024-01-25

## 2024-01-25 LAB
ABO/RH(D): NORMAL
ALBUMIN SERPL BCG-MCNC: 3.6 G/DL (ref 3.5–5.2)
ALP SERPL-CCNC: 71 U/L (ref 40–150)
ALT SERPL W P-5'-P-CCNC: 20 U/L (ref 0–70)
ANION GAP SERPL CALCULATED.3IONS-SCNC: 8 MMOL/L (ref 7–15)
ANTIBODY SCREEN: NEGATIVE
AST SERPL W P-5'-P-CCNC: 17 U/L (ref 0–45)
BASOPHILS # BLD AUTO: 0.1 10E3/UL (ref 0–0.2)
BASOPHILS NFR BLD AUTO: 1 %
BILIRUB SERPL-MCNC: 0.4 MG/DL
BUN SERPL-MCNC: 18.9 MG/DL (ref 8–23)
CALCIUM SERPL-MCNC: 8.6 MG/DL (ref 8.8–10.2)
CHLORIDE SERPL-SCNC: 109 MMOL/L (ref 98–107)
CREAT SERPL-MCNC: 0.97 MG/DL (ref 0.67–1.17)
DEPRECATED HCO3 PLAS-SCNC: 24 MMOL/L (ref 22–29)
EGFRCR SERPLBLD CKD-EPI 2021: 85 ML/MIN/1.73M2
EOSINOPHIL # BLD AUTO: 0.5 10E3/UL (ref 0–0.7)
EOSINOPHIL NFR BLD AUTO: 5 %
ERYTHROCYTE [DISTWIDTH] IN BLOOD BY AUTOMATED COUNT: 13.8 % (ref 10–15)
GLUCOSE SERPL-MCNC: 119 MG/DL (ref 70–99)
HCT VFR BLD AUTO: 46.7 % (ref 40–53)
HGB BLD-MCNC: 15.6 G/DL (ref 13.3–17.7)
IMM GRANULOCYTES # BLD: 0.1 10E3/UL
IMM GRANULOCYTES NFR BLD: 1 %
LYMPHOCYTES # BLD AUTO: 3.1 10E3/UL (ref 0.8–5.3)
LYMPHOCYTES NFR BLD AUTO: 31 %
MCH RBC QN AUTO: 29.4 PG (ref 26.5–33)
MCHC RBC AUTO-ENTMCNC: 33.4 G/DL (ref 31.5–36.5)
MCV RBC AUTO: 88 FL (ref 78–100)
MONOCYTES # BLD AUTO: 1.2 10E3/UL (ref 0–1.3)
MONOCYTES NFR BLD AUTO: 12 %
NEUTROPHILS # BLD AUTO: 5.2 10E3/UL (ref 1.6–8.3)
NEUTROPHILS NFR BLD AUTO: 50 %
NRBC # BLD AUTO: 0 10E3/UL
NRBC BLD AUTO-RTO: 0 /100
PLATELET # BLD AUTO: 191 10E3/UL (ref 150–450)
POTASSIUM SERPL-SCNC: 4.1 MMOL/L (ref 3.4–5.3)
PROT SERPL-MCNC: 6.2 G/DL (ref 6.4–8.3)
RBC # BLD AUTO: 5.31 10E6/UL (ref 4.4–5.9)
SODIUM SERPL-SCNC: 141 MMOL/L (ref 135–145)
SPECIMEN EXPIRATION DATE: NORMAL
TROPONIN T SERPL HS-MCNC: 7 NG/L
TROPONIN T SERPL HS-MCNC: 7 NG/L
TROPONIN T SERPL HS-MCNC: <6 NG/L
WBC # BLD AUTO: 10.1 10E3/UL (ref 4–11)

## 2024-01-25 PROCEDURE — 99239 HOSP IP/OBS DSCHRG MGMT >30: CPT | Performed by: INTERNAL MEDICINE

## 2024-01-25 PROCEDURE — 96374 THER/PROPH/DIAG INJ IV PUSH: CPT | Mod: 59

## 2024-01-25 PROCEDURE — 250N000011 HC RX IP 250 OP 636: Performed by: INTERNAL MEDICINE

## 2024-01-25 PROCEDURE — 84484 ASSAY OF TROPONIN QUANT: CPT | Performed by: INTERNAL MEDICINE

## 2024-01-25 PROCEDURE — 93458 L HRT ARTERY/VENTRICLE ANGIO: CPT | Performed by: INTERNAL MEDICINE

## 2024-01-25 PROCEDURE — 272N000001 HC OR GENERAL SUPPLY STERILE: Performed by: INTERNAL MEDICINE

## 2024-01-25 PROCEDURE — 36415 COLL VENOUS BLD VENIPUNCTURE: CPT | Performed by: INTERNAL MEDICINE

## 2024-01-25 PROCEDURE — 250N000013 HC RX MED GY IP 250 OP 250 PS 637: Performed by: INTERNAL MEDICINE

## 2024-01-25 PROCEDURE — 85025 COMPLETE CBC W/AUTO DIFF WBC: CPT | Performed by: INTERNAL MEDICINE

## 2024-01-25 PROCEDURE — 250N000013 HC RX MED GY IP 250 OP 250 PS 637: Performed by: NURSE PRACTITIONER

## 2024-01-25 PROCEDURE — G0378 HOSPITAL OBSERVATION PER HR: HCPCS

## 2024-01-25 PROCEDURE — 99152 MOD SED SAME PHYS/QHP 5/>YRS: CPT | Performed by: INTERNAL MEDICINE

## 2024-01-25 PROCEDURE — 250N000009 HC RX 250: Performed by: INTERNAL MEDICINE

## 2024-01-25 PROCEDURE — 93458 L HRT ARTERY/VENTRICLE ANGIO: CPT | Mod: 26 | Performed by: INTERNAL MEDICINE

## 2024-01-25 PROCEDURE — C1769 GUIDE WIRE: HCPCS | Performed by: INTERNAL MEDICINE

## 2024-01-25 PROCEDURE — 255N000002 HC RX 255 OP 636: Performed by: INTERNAL MEDICINE

## 2024-01-25 PROCEDURE — 86900 BLOOD TYPING SEROLOGIC ABO: CPT | Performed by: NURSE PRACTITIONER

## 2024-01-25 PROCEDURE — 99215 OFFICE O/P EST HI 40 MIN: CPT | Performed by: GENERAL ACUTE CARE HOSPITAL

## 2024-01-25 PROCEDURE — 80053 COMPREHEN METABOLIC PANEL: CPT | Performed by: INTERNAL MEDICINE

## 2024-01-25 PROCEDURE — C1894 INTRO/SHEATH, NON-LASER: HCPCS | Performed by: INTERNAL MEDICINE

## 2024-01-25 PROCEDURE — 258N000003 HC RX IP 258 OP 636: Performed by: NURSE PRACTITIONER

## 2024-01-25 RX ORDER — OXYCODONE HYDROCHLORIDE 5 MG/1
10 TABLET ORAL EVERY 4 HOURS PRN
Status: DISCONTINUED | OUTPATIENT
Start: 2024-01-25 | End: 2024-01-25

## 2024-01-25 RX ORDER — SODIUM CHLORIDE 9 MG/ML
75 INJECTION, SOLUTION INTRAVENOUS CONTINUOUS
Status: DISCONTINUED | OUTPATIENT
Start: 2024-01-25 | End: 2024-01-25

## 2024-01-25 RX ORDER — FLUMAZENIL 0.1 MG/ML
0.2 INJECTION, SOLUTION INTRAVENOUS
Status: DISCONTINUED | OUTPATIENT
Start: 2024-01-25 | End: 2024-01-25 | Stop reason: HOSPADM

## 2024-01-25 RX ORDER — ROSUVASTATIN CALCIUM 40 MG/1
40 TABLET, COATED ORAL DAILY
Status: DISCONTINUED | OUTPATIENT
Start: 2024-01-26 | End: 2024-01-25 | Stop reason: HOSPADM

## 2024-01-25 RX ORDER — ACETAMINOPHEN 325 MG/1
650 TABLET ORAL EVERY 4 HOURS PRN
Status: DISCONTINUED | OUTPATIENT
Start: 2024-01-25 | End: 2024-01-25 | Stop reason: HOSPADM

## 2024-01-25 RX ORDER — HYDRALAZINE HYDROCHLORIDE 20 MG/ML
10 INJECTION INTRAMUSCULAR; INTRAVENOUS
Status: DISCONTINUED | OUTPATIENT
Start: 2024-01-25 | End: 2024-01-25

## 2024-01-25 RX ORDER — NALOXONE HYDROCHLORIDE 0.4 MG/ML
0.4 INJECTION, SOLUTION INTRAMUSCULAR; INTRAVENOUS; SUBCUTANEOUS
Status: DISCONTINUED | OUTPATIENT
Start: 2024-01-25 | End: 2024-01-25 | Stop reason: HOSPADM

## 2024-01-25 RX ORDER — ATROPINE SULFATE 0.1 MG/ML
0.5 INJECTION INTRAVENOUS
Status: DISCONTINUED | OUTPATIENT
Start: 2024-01-25 | End: 2024-01-25 | Stop reason: HOSPADM

## 2024-01-25 RX ORDER — ASPIRIN 325 MG
325 TABLET ORAL DAILY
Status: DISCONTINUED | OUTPATIENT
Start: 2024-01-25 | End: 2024-01-25

## 2024-01-25 RX ORDER — FENTANYL CITRATE 50 UG/ML
25 INJECTION, SOLUTION INTRAMUSCULAR; INTRAVENOUS
Status: DISCONTINUED | OUTPATIENT
Start: 2024-01-25 | End: 2024-01-25

## 2024-01-25 RX ORDER — ACETAMINOPHEN 325 MG/1
650 TABLET ORAL EVERY 4 HOURS PRN
Status: DISCONTINUED | OUTPATIENT
Start: 2024-01-25 | End: 2024-01-25

## 2024-01-25 RX ORDER — ASPIRIN 81 MG/1
81 TABLET ORAL DAILY
Status: DISCONTINUED | OUTPATIENT
Start: 2024-01-26 | End: 2024-01-25 | Stop reason: HOSPADM

## 2024-01-25 RX ORDER — OXYCODONE HYDROCHLORIDE 5 MG/1
5 TABLET ORAL EVERY 4 HOURS PRN
Status: DISCONTINUED | OUTPATIENT
Start: 2024-01-25 | End: 2024-01-25 | Stop reason: HOSPADM

## 2024-01-25 RX ORDER — SODIUM CHLORIDE 9 MG/ML
75 INJECTION, SOLUTION INTRAVENOUS CONTINUOUS
Status: DISCONTINUED | OUTPATIENT
Start: 2024-01-25 | End: 2024-01-25 | Stop reason: HOSPADM

## 2024-01-25 RX ORDER — ROSUVASTATIN CALCIUM 40 MG/1
40 TABLET, COATED ORAL DAILY
Status: DISCONTINUED | OUTPATIENT
Start: 2024-01-25 | End: 2024-01-25

## 2024-01-25 RX ORDER — FENTANYL CITRATE 50 UG/ML
INJECTION, SOLUTION INTRAMUSCULAR; INTRAVENOUS
Status: DISCONTINUED | OUTPATIENT
Start: 2024-01-25 | End: 2024-01-25 | Stop reason: HOSPADM

## 2024-01-25 RX ORDER — NALOXONE HYDROCHLORIDE 0.4 MG/ML
0.2 INJECTION, SOLUTION INTRAMUSCULAR; INTRAVENOUS; SUBCUTANEOUS
Status: DISCONTINUED | OUTPATIENT
Start: 2024-01-25 | End: 2024-01-25 | Stop reason: HOSPADM

## 2024-01-25 RX ORDER — NALOXONE HYDROCHLORIDE 0.4 MG/ML
0.2 INJECTION, SOLUTION INTRAMUSCULAR; INTRAVENOUS; SUBCUTANEOUS
Status: DISCONTINUED | OUTPATIENT
Start: 2024-01-25 | End: 2024-01-25

## 2024-01-25 RX ORDER — IODIXANOL 320 MG/ML
INJECTION, SOLUTION INTRAVASCULAR
Status: DISCONTINUED | OUTPATIENT
Start: 2024-01-25 | End: 2024-01-25 | Stop reason: HOSPADM

## 2024-01-25 RX ORDER — NALOXONE HYDROCHLORIDE 0.4 MG/ML
0.4 INJECTION, SOLUTION INTRAMUSCULAR; INTRAVENOUS; SUBCUTANEOUS
Status: DISCONTINUED | OUTPATIENT
Start: 2024-01-25 | End: 2024-01-25

## 2024-01-25 RX ORDER — FENTANYL CITRATE 50 UG/ML
25 INJECTION, SOLUTION INTRAMUSCULAR; INTRAVENOUS
Status: DISCONTINUED | OUTPATIENT
Start: 2024-01-25 | End: 2024-01-25 | Stop reason: HOSPADM

## 2024-01-25 RX ORDER — ROSUVASTATIN CALCIUM 40 MG/1
40 TABLET, COATED ORAL DAILY
Qty: 90 TABLET | Refills: 3 | Status: SHIPPED | OUTPATIENT
Start: 2024-01-25

## 2024-01-25 RX ORDER — SODIUM CHLORIDE 9 MG/ML
INJECTION, SOLUTION INTRAVENOUS CONTINUOUS
Status: DISCONTINUED | OUTPATIENT
Start: 2024-01-25 | End: 2024-01-25 | Stop reason: HOSPADM

## 2024-01-25 RX ORDER — HYDRALAZINE HYDROCHLORIDE 20 MG/ML
10 INJECTION INTRAMUSCULAR; INTRAVENOUS
Status: DISCONTINUED | OUTPATIENT
Start: 2024-01-25 | End: 2024-01-25 | Stop reason: HOSPADM

## 2024-01-25 RX ORDER — ATROPINE SULFATE 0.1 MG/ML
0.5 INJECTION INTRAVENOUS
Status: DISCONTINUED | OUTPATIENT
Start: 2024-01-25 | End: 2024-01-25

## 2024-01-25 RX ORDER — ROSUVASTATIN CALCIUM 40 MG/1
40 TABLET, COATED ORAL DAILY
Qty: 90 TABLET | Refills: 3 | Status: SHIPPED | OUTPATIENT
Start: 2024-01-25 | End: 2024-01-25

## 2024-01-25 RX ORDER — LIDOCAINE 40 MG/G
CREAM TOPICAL
Status: DISCONTINUED | OUTPATIENT
Start: 2024-01-25 | End: 2024-01-25 | Stop reason: HOSPADM

## 2024-01-25 RX ORDER — OXYCODONE HYDROCHLORIDE 5 MG/1
10 TABLET ORAL EVERY 4 HOURS PRN
Status: DISCONTINUED | OUTPATIENT
Start: 2024-01-25 | End: 2024-01-25 | Stop reason: HOSPADM

## 2024-01-25 RX ORDER — DIAZEPAM 5 MG
5 TABLET ORAL
Status: COMPLETED | OUTPATIENT
Start: 2024-01-25 | End: 2024-01-25

## 2024-01-25 RX ORDER — ASPIRIN 81 MG/1
243 TABLET, CHEWABLE ORAL ONCE
Status: COMPLETED | OUTPATIENT
Start: 2024-01-25 | End: 2024-01-25

## 2024-01-25 RX ORDER — FLUMAZENIL 0.1 MG/ML
0.2 INJECTION, SOLUTION INTRAVENOUS
Status: DISCONTINUED | OUTPATIENT
Start: 2024-01-25 | End: 2024-01-25

## 2024-01-25 RX ORDER — ASPIRIN 325 MG
325 TABLET ORAL ONCE
Status: COMPLETED | OUTPATIENT
Start: 2024-01-25 | End: 2024-01-25

## 2024-01-25 RX ORDER — OXYCODONE HYDROCHLORIDE 5 MG/1
5 TABLET ORAL EVERY 4 HOURS PRN
Status: DISCONTINUED | OUTPATIENT
Start: 2024-01-25 | End: 2024-01-25

## 2024-01-25 RX ADMIN — Medication 81 MG: at 08:12

## 2024-01-25 RX ADMIN — ROSUVASTATIN CALCIUM 20 MG: 10 TABLET, FILM COATED ORAL at 08:16

## 2024-01-25 RX ADMIN — METOPROLOL SUCCINATE 50 MG: 50 TABLET, EXTENDED RELEASE ORAL at 08:13

## 2024-01-25 RX ADMIN — Medication 25 MCG: at 08:13

## 2024-01-25 RX ADMIN — CLOPIDOGREL BISULFATE 75 MG: 75 TABLET ORAL at 08:12

## 2024-01-25 RX ADMIN — ASPIRIN 81 MG CHEWABLE TABLET 243 MG: 81 TABLET CHEWABLE at 12:02

## 2024-01-25 RX ADMIN — DIAZEPAM 5 MG: 5 TABLET ORAL at 13:27

## 2024-01-25 RX ADMIN — B-COMPLEX W/ C & FOLIC ACID TAB 1 TABLET: TAB at 08:16

## 2024-01-25 RX ADMIN — PANTOPRAZOLE SODIUM 40 MG: 40 TABLET, DELAYED RELEASE ORAL at 06:58

## 2024-01-25 RX ADMIN — SODIUM CHLORIDE: 9 INJECTION, SOLUTION INTRAVENOUS at 12:36

## 2024-01-25 RX ADMIN — FAMOTIDINE 20 MG: 10 INJECTION, SOLUTION INTRAVENOUS at 00:10

## 2024-01-25 RX ADMIN — LISINOPRIL 2.5 MG: 2.5 TABLET ORAL at 08:16

## 2024-01-25 ASSESSMENT — ACTIVITIES OF DAILY LIVING (ADL)
ADLS_ACUITY_SCORE: 35
DEPENDENT_IADLS:: INDEPENDENT
ADLS_ACUITY_SCORE: 35

## 2024-01-25 NOTE — DISCHARGE INSTRUCTIONS
Interventional Cardiology  Coronary Angiogram/Angioplasty/Stent/Atherectomy Discharge Instructions -   Femoral Approach    The instructions below are to help you understand how to take care of yourself. There is also information about when to call the doctor or emergency services    **Do not stop your aspirin or platelet inhibitor unless directed by your Cardiologist.  These medications help to prevent platelets in your blood from sticking together and forming a clot.  Examples of these medications are:  Ticagrelor (Brilinta), Clopidigrel (Plavix), Prasugrel (Effient)          For the first 72 hours after your procedure:  No driving for 24 hours  Do not lift more than 15 pounds.  If you usually lift 50 pounds or more daily, please contact your cardiologist  Avoid any hard work or tiring activities, this includes, yard work, jogging, biking, sexual activity  During the day get up and walk around every 2 hours  You may return to work after 72 hours if you are feeling well and your job does not involve heavy lifting          Groin Site / Wound Care / Bleeding    You may take off the dressing on your groin the day after your procedure  Keep the area dry and clean  It is ok to shower with regular soap.  Pat dry, do not rub  You do not need to use a bandage  No tub/pool or hot tub for 1 week  If your groin starts to bleed or begins to swell suddenly after leaving the hospital, lie flat and apply firm pressure above the site for 15 minutes.  If bleeding continues, call 9-1-1  Bruising around the groin area is normal.  It may take 2-3 weeks for this to go away.  It is normal for the bruised area to turn green and/or yellow as it is healing.  A small lump may also be present and may last 2-3 months.  Your leg may be sore or stiff for a few days.  You may take Tylenol or a pain medicine recommended by your doctor  If you have a fever over 100.4, that lasts more than one day - call your cardiologist.      Our Cardiac Rehab  staff may visit briefly with you while your in the hospital.  If they miss you, someone will contact you after you are home.  You are encouraged to enroll in an Outpatient Cardiac Rehab Program     No elective dental work for 6 weeks after having a stent.     No driving for 24 hours      Your Procedural Physician was: Dr. Christopher Hogan  the phone number is: [phone]      [facility]:  [phone]  If you are calling after hours, please listen to the entire voicemail, a live  will answer at the end of the message

## 2024-01-25 NOTE — CONSULTS
"     We have been asked to see Bertrand Palacios at [facility] by Dr. Chris Hamilton for evaluation of chest pain.    Impression and Plan     Assessment:  Chest pain concerning for unstable angina.  Coronary artery disease with incomplete records of history but per patient beginning with a myocardial infarction and stenting in the 1990s, anther stent possibly placed to the proximal left anterior descending artery in 2006, drug-eluting stenting x2 to in-stent restenosis of the proximal-mid left anterior descending artery 3/6/2020 and most recently drug-eluting stenting x1 to the proximal right coronary artery and drug-eluting stenting x2 to the right posterior descending artery 7/15/2021. To his knowledge he has had three myocardial infarctions and 7 stents total.  Essential hypertension. Controlled.  Hyperlipidemia. Last LDL 67 mg/dL.  Nocturnal hypoxemia. He denies any symptoms of obstructive sleep apnea.  Obesity with body mass index is 32.69 kg/m .    Plan:  Proceed with coronary angiography. Explained the risks and benefits to the patient. He demonstrates understanding and wishes to proceed.  We can hold on starting intravenous heparin  Continue dual antiplatelet therapy with aspirin 81 mg and clopidogrel 75 mg daily which it appears he has been taking indefinitely  Rosuvastatin 20 mg daily    Primary Cardiologist: Dr. Ana Choi    Clinically Significant Risk Factors Present on Admission     # Drug Induced Platelet Defect: home medication list includes an antiplatelet medication   # Hypertension: Noted on problem list      # Obesity: Estimated body mass index is 32.69 kg/m  as calculated from the following:    Height as of this encounter: 1.727 m (5' 8\").    Weight as of this encounter: 97.5 kg (215 lb).             History of Present Illness      Mr. Bertrand Palacios is a 68 year old male with a history of CAD reportedly with a history of MI and stenting in the 1990s (records unavailable) then stenting " possibly of the proximal LAD in 2006, EDNA x2 to the proximal and mid LAD 3/6/2020, and most recently EDNA x1 to the proximal RCA and EDNA x2 to the RPDA 7/15/2021, HTN and HLD admitted yesterday with chest pain.    He woke up yesterday morning with an aching in his left chest and tingling radiating down his left arm. It persisted throughout the day. He also felt shortness of breath and fatigued. He has also been increasingly dyspneic on exertion over the past several months. Our records of his coronary history is incomplete but he says it started in the 1990s and he has had three heart attacks and 7 stents total to his knowledge. His current symptoms feels similar to these prior episodes and he felt much better after getting his most recent stents.    No light headedness/dizziness, pre-syncope, syncope, lower extremity swelling, palpitations, paroxysmal nocturnal dyspnea (PND), or orthopnea.    Vitals have been stable although he was noted to have hypoxia when sleeping prompting supplemental oxygen. He denies snoring, apneic episodes or excessive daytime sleepiness. ECG showed no acute ischemic changes. Hs-troponin was detectable at 7 but within normal limits. CTA chest/abdomen/pelvis showed no acute abnormalities.    Review of Systems:  Further review of systems is otherwise negative/noncontributory (based on review of medical record (admission H&P) and 13 point review of systems reviewed. Pertinent positives noted).    Cardiac Diagnostics     ECG 1/24/2024 (personally reviewed and interpreted): SR, possible septal and lateral infarcts    Telemetry (personally reviewed): SR, no arrhythmias    Echocardiogram 2/10/2022 (results reviewed):   1. Normal left ventricular size and systolic performance with a visually estimated ejection fraction of 50-55%.  2. There is mild distal lateral and distal anterior hypokinesis.  3. No significant valvular heart disease is identified on this study.  4. Normal right ventricular size  and systolic performance.  5. The prior real-time echocardiogram dated 17 July 2020 could not be accessed from the digital archive for direct comparison.    Cardiac Cath 7/15/2021 (results reviewed):   Prox RCA to Mid RCA lesion is 60% stenosed. Lesion successfully treated with 3.0 drug-eluting stent with wide patency  RPDA-1 lesion is 80% stenosed. Lesion successfully treated with 2.25 drug-eluting stent with wide patency  RPDA-2 lesion is 70% stenosed. Lesion successfully treated with 2.25 drug-eluting stent with wide patency  Previously stents in proximal and mid LAD are widely patent  Attempt to access right radial artery unsuccessful no complication  Right femoral access successful with closure using VIP Angio-Seal device without complication    Cardiac Stress Testing 2/25/2020 (results reviewed):     Patient exercised on a standard David protocol 6 minutes and 30 seconds stopping secondary to leg fatigue and dyspnea. Appropriate heart rate and blood pressure response to exercise.    The stress electrocardiogram was abnormal with 1 mm of horizontal ST segment depression in the V4, V5 and V6 leads isolated to peak exercise and returned to baseline immediately in recovery.    Definity contrast utilized    Left ventricle ejection fraction at rest is normal. Resting ejection fraction estimated 55 to 60% no resting wall motion abnormality observed.    Exercise there is appropriate augmentation of systolic contraction without observed regional wall motion abnormality.    Conclusions: Fair exercise tolerance.Abnormal EKG at peak exercise without echocardiographic findings to suggest ischemia at the workload achieved.      Medical History  Surgical History Family History Social History   Past Medical History:   Diagnosis Date    Cancer (H)     bladder    Coronary artery disease     S/P PCI in 2006 Per H&P    Hiatal hernia     Per H&P    History of ASCVD     per H&P    Hypertension      Past Surgical History:    Procedure Laterality Date    BLADDER SURGERY  2008    tumor     CARDIAC CATHETERIZATION      2006 Per H&P    CV CORONARY ANGIOGRAM N/A 3/6/2020    Procedure: Coronary Angiogram;  Surgeon: Tony Juárez MD;  Location: [facility] Cath Lab;  Service: Cardiology    CV HEART CATHETERIZATION WITH POSSIBLE INTERVENTION N/A 7/15/2021    Procedure: Coronary Angiogram;  Surgeon: Tony Juárez MD;  Location: [facility] CATH LAB CV    CV PCI N/A 7/15/2021    Procedure: PERCUTANEOUS CORONARY INTERVENTION;  Surgeon: Tony Juárez MD;  Location: [facility] CV    IR MISCELLANEOUS PROCEDURE  11/9/2006     Family History   Problem Relation Age of Onset    Cerebrovascular Disease Mother     Heart Disease Mother     Cancer Father     No Known Problems Sister     Other - See Comments Brother 77.00    No Known Problems Brother     No Known Problems Sister            Social History     Socioeconomic History    Marital status:      Spouse name: Not on file    Number of children: Not on file    Years of education: Not on file    Highest education level: Not on file   Occupational History    Not on file   Tobacco Use    Smoking status: Former     Types: Cigarettes     Quit date: 11/17/2008     Years since quitting: 15.1    Smokeless tobacco: Never   Substance and Sexual Activity    Alcohol use: Never    Drug use: Not Currently    Sexual activity: Not Currently   Other Topics Concern    Not on file   Social History Narrative    Not on file     Social Determinants of Health     Financial Resource Strain: Not on file   Food Insecurity: Not on file   Transportation Needs: Not on file   Physical Activity: Not on file   Stress: Not on file   Social Connections: Not on file   Interpersonal Safety: Not on file   Housing Stability: Not on file             Physical Examination   VITALS: /57 (BP Location: Left arm, Patient Position: Semi-Isaac's)   Pulse 66   Temp 98.6  F (37  C) (Oral)   Resp 21   Ht 1.727 m  "(5' 8\")   Wt 97.5 kg (215 lb)   SpO2 94%   BMI 32.69 kg/m    BMI: Body mass index is 32.69 kg/m .  Wt Readings from Last 3 Encounters:   01/24/24 97.5 kg (215 lb)   03/03/23 98.9 kg (218 lb)   02/01/22 95.7 kg (211 lb)         Intake/Output Summary (Last 24 hours) at 1/25/2024 0733  Last data filed at 1/25/2024 0059  Gross per 24 hour   Intake 240 ml   Output --   Net 240 ml       General Appearance:  Alert, cooperative, no distress, appears stated age    Head:  Normocephalic, without obvious abnormality, atraumatic   Eyes:  PERRL, conjunctiva/corneas clear, EOM's intact   Ears:  Normal external ear canals bilaterally   Nose: Nares normal, septum midline, no drainage   Throat: Lips, mucosa, and tongue normal; teeth and gums normal   Neck: Supple, symmetrical, trachea midline, no adenopathy, no carotid bruit or JVD    Back:   Symmetric, no abnormal curvature, ROM normal   Lungs:   Clear to auscultation bilaterally, respirations unlabored   Chest Wall:  No tenderness or deformity   Heart:  Regular rate and rhythm , S1, S2 normal,no murmur, rub or gallop   Abdomen:   Soft, non-tender, bowel sounds active all four quadrants,  no masses, no organomegaly   Extremities: Extremities normal, atraumatic, no cyanosis or edema   Skin: Skin color, texture, turgor normal, no rashes or lesions   Psychiatric: Normal affect, pleasant and cooperative    Neurologic: Alert and oriented X 3, Moves all 4 extremities            Imaging      CTA chest/abdomen/pelvis 1/24/2024 (report reviewed):  1.  No evidence of thoracoabdominal aortic aneurysm or dissection.  2.  Large hiatal hernia.     Lab Results    Chemistry/lipid CBC Cardiac Enzymes/BNP/TSH/INR   Recent Labs   Lab Test 07/15/21  0717   CHOL 126   HDL 42   LDL 57   TRIG 136     Recent Labs   Lab Test 07/15/21  0717 03/07/20  0708 03/06/20  1005   LDL 57 83 89     Recent Labs   Lab Test 01/25/24  0445      POTASSIUM 4.1   CHLORIDE 109*   CO2 24   *   BUN 18.9   CR " 0.97   GFRESTIMATED 85   LESLIE 8.6*     Recent Labs   Lab Test 01/25/24 0445 01/24/24 2036 07/17/21  0121   CR 0.97 0.89 0.82          Recent Labs   Lab Test 01/25/24 0445   WBC 10.1   HGB 15.6   HCT 46.7   MCV 88        Recent Labs   Lab Test 01/25/24 0445 01/24/24 2036 07/17/21  0121   HGB 15.6 16.3 15.7    Recent Labs   Lab Test 07/17/21  0121 07/16/21  0554 02/29/20  0651   TROPONINI 0.08 0.05 0.02     Recent Labs   Lab Test 01/24/24 2036   NTBNPI <36             Current Inpatient Scheduled Medications   Scheduled Meds:   aspirin  81 mg Oral Daily    clopidogrel  75 mg Oral Daily    famotidine  20 mg Intravenous Q12H    latanoprost  1 drop Both Eyes At Bedtime    lisinopril  2.5 mg Oral Daily    metoprolol succinate ER  50 mg Oral Daily    pantoprazole  40 mg Oral BID AC    rosuvastatin  20 mg Oral Daily    sodium chloride (PF)  3 mL Intracatheter Q8H    vitamin B complex with vitamin C  1 tablet Oral Daily    Vitamin D3  25 mcg Oral Daily       Current Outpatient Medications   Medication Sig Dispense Refill    aspirin 81 MG EC tablet [ASPIRIN 81 MG EC TABLET] Take 81 mg by mouth daily.      B Complex Vitamins (B COMPLEX PO) Take 1 capsule by mouth daily      Cholecalciferol (VITAMIN D3 PO) Take 1 tablet by mouth daily      clopidogrel (PLAVIX) 75 MG tablet Take 75 mg by mouth daily      gabapentin (NEURONTIN) 100 MG capsule Take 100 mg by mouth 3 times daily as needed for neuropathic pain      latanoprost (XALATAN) 0.005 % ophthalmic solution Place 1 drop into both eyes at bedtime      lisinopril (PRINIVIL,ZESTRIL) 2.5 MG tablet [LISINOPRIL (PRINIVIL,ZESTRIL) 2.5 MG TABLET] Take 2.5 mg by mouth daily.      metoprolol succinate (TOPROL-XL) 50 MG 24 hr tablet [METOPROLOL SUCCINATE (TOPROL-XL) 50 MG 24 HR TABLET] Take 1 tablet (50 mg total) by mouth daily. 90 tablet 1    nitroglycerin (NITROSTAT) 0.4 MG SL tablet [NITROGLYCERIN (NITROSTAT) 0.4 MG SL TABLET] PLACE 1 TABLET UNDER THE TONGUE EVERY 5MIN AS  NEEDED FOR CHEST PAIN. IF SYMPTOMS DO NOT RESOLVE IN 5MIN, REPEAT DOSE AND CALL [phone] Bottle 1    omeprazole (PRILOSEC) 20 MG capsule Take 20 mg by mouth 2 times daily (before meals)       rosuvastatin (CRESTOR) 20 MG tablet Take 20 mg by mouth daily            Medications Prior to Admission   Prior to Admission medications    Medication Sig Start Date End Date Taking? Authorizing Provider   aspirin 81 MG EC tablet [ASPIRIN 81 MG EC TABLET] Take 81 mg by mouth daily. 2/13/20  Yes Provider, Historical   B Complex Vitamins (B COMPLEX PO) Take 1 capsule by mouth daily   Yes Reported, Patient   Cholecalciferol (VITAMIN D3 PO) Take 1 tablet by mouth daily   Yes Reported, Patient   clopidogrel (PLAVIX) 75 MG tablet Take 75 mg by mouth daily 7/6/23  Yes Reported, Patient   gabapentin (NEURONTIN) 100 MG capsule Take 100 mg by mouth 3 times daily as needed for neuropathic pain 3/17/20  Yes Provider, Historical   latanoprost (XALATAN) 0.005 % ophthalmic solution Place 1 drop into both eyes at bedtime 11/30/23  Yes Reported, Patient   lisinopril (PRINIVIL,ZESTRIL) 2.5 MG tablet [LISINOPRIL (PRINIVIL,ZESTRIL) 2.5 MG TABLET] Take 2.5 mg by mouth daily. 2/13/20  Yes Provider, Historical   metoprolol succinate (TOPROL-XL) 50 MG 24 hr tablet [METOPROLOL SUCCINATE (TOPROL-XL) 50 MG 24 HR TABLET] Take 1 tablet (50 mg total) by mouth daily. 9/23/20  Yes Mela Choi MD   nitroglycerin (NITROSTAT) 0.4 MG SL tablet [NITROGLYCERIN (NITROSTAT) 0.4 MG SL TABLET] PLACE 1 TABLET UNDER THE TONGUE EVERY 5MIN AS NEEDED FOR CHEST PAIN. IF SYMPTOMS DO NOT RESOLVE IN 5MIN, REPEAT DOSE AND CALL 911 2/22/21  Yes Tony Juárez MD   omeprazole (PRILOSEC) 20 MG capsule Take 20 mg by mouth 2 times daily (before meals)  2/13/20  Yes Provider, Historical   rosuvastatin (CRESTOR) 20 MG tablet Take 20 mg by mouth daily 3/17/20  Yes Provider, Historical          Sotero Navarrete MD [facility]  Non-Invasive Cardiologist  [facility]  Care  Pager [phone]

## 2024-01-25 NOTE — ED TRIAGE NOTES
Pt states left sided chest pain with numbness going down left arm, neck and shoulder.  Pt states some exertional shortness of breath.  Pt is former smoker of 15 yrs ago.

## 2024-01-25 NOTE — PROGRESS NOTES
Coronary angiography shows patent stents and no new disease. Symptoms are suspected to be noncardiac. Okay to discharge home tonight.    Sotero Navarrete MD [facility]  Non-Invasive Cardiologist  [facility]  Pager [phone]

## 2024-01-25 NOTE — PLAN OF CARE
PRIMARY DIAGNOSIS: CHEST PAIN  OUTPATIENT/OBSERVATION GOALS TO BE MET BEFORE DISCHARGE:  1. Ruled out acute coronary syndrome (negative or stable Troponin):  Yes  2. Pain Status: Pain free.  3. Appropriate provocative testing performed: No  - Stress Test Procedure: awaiting consult  - Interpretation of cardiac rhythm per telemetry tech: NSR    4. Cleared by Consultants (if applicable):No  5. Return to near baseline physical activity: Yes  Discharge Planner Nurse   Safe discharge environment identified: Yes  Barriers to discharge: Yes, tests and CARDS consult       Entered by: Latrice Nickerson RN 01/25/2024 6:35 AM     Please review provider order for any additional goals.   Nurse to notify provider when observation goals have been met and patient is ready for discharge.  Goal Outcome Evaluation:

## 2024-01-25 NOTE — ED NOTES
[facility] ED Handoff Report    ED Chief Complaint: Chest tightness/numbness    ED Diagnosis:  (R07.9) Chest pain, unspecified type  Comment:   Plan:        PMH:    Past Medical History:   Diagnosis Date    Cancer (H)     bladder    Coronary artery disease     S/P PCI in 2006 Per H&P    Hiatal hernia     Per H&P    History of ASCVD     per H&P    Hypertension         Code Status:  Prior     Falls Risk: No Band: Not applicable    Current Living Situation/Residence: lives in a house     Elimination Status: Continent: Yes     Activity Level: Independent    Patients Preferred Language:  English     Needed: No    Vital Signs:  /61   Pulse 71   Temp 98.8  F (37.1  C) (Temporal)   Resp 18   Wt 97.5 kg (215 lb)   SpO2 93%   BMI 32.69 kg/m       Cardiac Rhythm: NSR    Pain Score: 2/10    Is the Patient Confused:  No    Last Food or Drink: 01/24/24 at 9pm    Focused Assessment:  Pt states left sided chest pain with numbness going down left arm, neck and shoulder. Pt states some exertional shortness of breath. Pt is former smoker of 15 yrs ago. He is unsure if this is similar to past heart attacks, last stent 2021. Has not seen cardiology in over 1+ year. Hasn't had a stress test in 1.5 years.      Denies history of clots. Denies recent falls. Patient is on baby asprin and plavix.    Tests Performed: Done: Labs and Imaging    Treatments Provided:  NTG x 1 dose, Aspirin (see MAR)    Family Dynamics/Concerns: No    Family Updated On Visitor Policy: No    Plan of Care Communicated to Family: No    Who Was Updated about Plan of Care: Patient    Belongings Checklist Done and Signed by Patient: No    Medications sent with patient: none    Covid: asymptomatic ,     Additional Information: Repeat Trop done at 10:30Pm    RN: Pal Gomez RN   1/24/2024 10:56 PM

## 2024-01-25 NOTE — ED PROVIDER NOTES
EMERGENCY DEPARTMENT ENCOUNTER      NAME: Bertrand Palacios  AGE: 68 year old male  YOB: 1955  MRN: [id number]  EVALUATION DATE & TIME: 1/24/2024  8:12 PM    PCP: Mayela Rayo    ED PROVIDER: Wendie Dyer MD      Chief Complaint   Patient presents with    Chest Pain    Numbness         FINAL IMPRESSION:  1. Chest pain, unspecified type          ED COURSE & MEDICAL DECISION MAKING:    Pertinent Labs & Imaging studies reviewed. (See chart for details)    8:23 PM I introduced myself to the patient, obtained patient history, performed a physical exam, and discussed plan for ED workup including potential diagnostic laboratory/imaging studies and interventions.    68 year old male with a pertinent history of CAD s/p multiple stents (last in 2021), hyperlipidemia, hypertension, chronic bronchitis who presents to this ED for evaluation of chest pain.  He reports this is left-sided chest pain radiating around towards his back and up to his shoulder with some tingling down his left arm with associated exertional shortness of breath, nausea, and generalized weakness.  With his extensive cardiac history do have significant concern that this may be acute coronary syndrome.  Also considered aortic dissection with the tingling.  Differential diagnosis includes PE as well, musculoskeletal etiology, less likely GERD but part of the differential, etc.  He has no focal deficits on exam and with the connection with the chest pain felt that CVA or intracranial pathology would be very unlikely.  States he has not had any sort of cardiac workup for at least a year and a half.  EKG did not reveal any evidence of acute ischemia.  Did repeat this approximately 1 hour later and was also unchanged without any sign of ST depression or elevation.  Initial troponin is 7 with repeat pending.  Lipase within normal limits.  CTA of the chest abdomen pelvis was obtained to rule out dissection.  This does not reveal any evidence of  central PE.  No sign of dissection.  Otherwise unremarkable without sign of pneumothorax, pneumonia, etc.  CMP largely unremarkable.  White blood cell count normal at 9.7.  Hemoglobin 16.3.  BNP is less than 36.  He was given 244 mg of aspirin as he had already taken his baby aspirin this morning.  Was also given 0.4 mg of sublingual nitro.  Felt that it was may be slightly improved with this.  Has no significant distress or pain at this point.  With his extensive cardiac history do feel he warrants admission for further cardiac evaluation.  He was in agreement with this plan.  He declined transfer at this point.  Spoke with the hospitalist accepted the patient for admission.  He was admitted in stable condition.         At the conclusion of the encounter I discussed the results of all of the tests and the disposition. The questions were answered. The patient or family acknowledged understanding and was agreeable with the care plan.         Medical Decision Making  Obtained supplemental history:Supplemental history obtained?: No  Reviewed external records: External records reviewed?: Documented in chart  Care impacted by chronic illness:Anticoagulated State, Heart Disease, Hyperlipidemia, Hypertension, and Other: chronic bronchitis, s/p stents   Care significantly affected by social determinants of health:N/A  Did you consider but not order tests?: Work up considered but not performed and documented in chart, if applicable  Did you interpret images independently?: Independent interpretation of ECG and images noted in documentation, when applicable.  Consultation discussion with other provider:Did you involve another provider (consultant, , pharmacy, etc.)?: I discussed the care with another health care provider, see documentation for details.  Admit.      MEDICATIONS GIVEN IN THE EMERGENCY:  Medications   lidocaine 1 % 0.1-1 mL (has no administration in time range)   lidocaine (LMX4) cream (has no administration  in time range)   sodium chloride (PF) 0.9% PF flush 3 mL (has no administration in time range)   sodium chloride (PF) 0.9% PF flush 3 mL (has no administration in time range)   senna-docusate (SENOKOT-S/PERICOLACE) 8.6-50 MG per tablet 1 tablet (has no administration in time range)     Or   senna-docusate (SENOKOT-S/PERICOLACE) 8.6-50 MG per tablet 2 tablet (has no administration in time range)   calcium carbonate (TUMS) chewable tablet 1,000 mg (has no administration in time range)   melatonin tablet 1 mg (has no administration in time range)   ondansetron (ZOFRAN ODT) ODT tab 4 mg (has no administration in time range)     Or   ondansetron (ZOFRAN) injection 4 mg (has no administration in time range)   famotidine (PEPCID) injection 20 mg (has no administration in time range)   lisinopril (ZESTRIL) tablet 2.5 mg (has no administration in time range)   aspirin EC tablet 81 mg (has no administration in time range)   rosuvastatin (CRESTOR) tablet 20 mg (has no administration in time range)   gabapentin (NEURONTIN) capsule 100 mg (has no administration in time range)   metoprolol succinate ER (TOPROL XL) 24 hr tablet 50 mg (has no administration in time range)   nitroGLYcerin (NITROSTAT) sublingual tablet 0.4 mg (has no administration in time range)   clopidogrel (PLAVIX) tablet 75 mg (has no administration in time range)   latanoprost (XALATAN) 0.005 % ophthalmic solution 1 drop (has no administration in time range)   B Complex CAPS (has no administration in time range)   Vitamin D3 (CHOLECALCIFEROL) tablet 25 mcg (has no administration in time range)   senna-docusate (SENOKOT-S/PERICOLACE) 8.6-50 MG per tablet 1 tablet (has no administration in time range)     Or   senna-docusate (SENOKOT-S/PERICOLACE) 8.6-50 MG per tablet 2 tablet (has no administration in time range)   ondansetron (ZOFRAN ODT) ODT tab 4 mg (has no administration in time range)     Or   ondansetron (ZOFRAN) injection 4 mg (has no administration in  time range)   pantoprazole (PROTONIX) EC tablet 40 mg (has no administration in time range)   nitroGLYcerin (NITROSTAT) sublingual tablet 0.4 mg (0.4 mg Sublingual $Given 1/24/24 2206)   iopamidol (ISOVUE-370) solution 90 mL (90 mLs Intravenous $Given 1/24/24 2133)   aspirin (ASA) chewable tablet 244 mg (244 mg Oral $Given 1/24/24 2206)       NEW PRESCRIPTIONS STARTED AT TODAY'S ER VISIT  New Prescriptions    No medications on file          =================================================================    [facility]    Patient information was obtained from: patient     Use of : N/A         Bertrand Palacios is a 68 year old male with a pertinent history of CAD s/p multiple stents (last in 2021), hyperlipidemia, hypertension, chronic bronchitis who presents to this ED for evaluation of chest pain.     Patient reports chest tightness to his left chest radiating into his back and shoulder with left arm tingling intermittent since yesterday. Pain was intermittent yesterday, but constant today and is worsened with exertion. Reports intermittent nausea, shortness of breath with exertion, generalized weakness. Tried nitroglycerin last night with no improvement of symptoms. Denies leg numbness, leg swelling, fever, cough. No abdominal pain, vomiting, diarrhea, focal weakness.     He is unsure if this is similar to past heart attacks as he doesn't remember the symptoms he had with those, last stent 2021. Has not seen cardiology in over 1+ year. States he hasn't had a stress test in 1.5 years.     Denies history of DVT/PE. Denies recent falls. Patient is on baby asprin and plavix.     REVIEW OF SYSTEMS   Review of Systems   Pertinent positives and negatives are documented in the HPI. All other systems reviewed and are negative.      PAST MEDICAL HISTORY:  Past Medical History:   Diagnosis Date    Cancer (H)     bladder    Coronary artery disease     S/P PCI in 2006 Per H&P    Hiatal hernia     Per H&P    History of ASCVD      per H&P    Hypertension        PAST SURGICAL HISTORY:  Past Surgical History:   Procedure Laterality Date    BLADDER SURGERY  2008    tumor     CARDIAC CATHETERIZATION      2006 Per H&P    CV CORONARY ANGIOGRAM N/A 3/6/2020    Procedure: Coronary Angiogram;  Surgeon: Tony Juárez MD;  Location: [facility] Cath Lab;  Service: Cardiology    CV HEART CATHETERIZATION WITH POSSIBLE INTERVENTION N/A 7/15/2021    Procedure: Coronary Angiogram;  Surgeon: Tony Juárez MD;  Location: [facility] CATH LAB CV    CV PCI N/A 7/15/2021    Procedure: PERCUTANEOUS CORONARY INTERVENTION;  Surgeon: Tony Juárez MD;  Location: [facility] CV    IR MISCELLANEOUS PROCEDURE  11/9/2006           CURRENT MEDICATIONS:    aspirin 81 MG EC tablet  B Complex Vitamins (B COMPLEX PO)  Cholecalciferol (VITAMIN D3 PO)  clopidogrel (PLAVIX) 75 MG tablet  gabapentin (NEURONTIN) 100 MG capsule  latanoprost (XALATAN) 0.005 % ophthalmic solution  lisinopril (PRINIVIL,ZESTRIL) 2.5 MG tablet  metoprolol succinate (TOPROL-XL) 50 MG 24 hr tablet  nitroglycerin (NITROSTAT) 0.4 MG SL tablet  omeprazole (PRILOSEC) 20 MG capsule  rosuvastatin (CRESTOR) 20 MG tablet        ALLERGIES:  Allergies   Allergen Reactions    Ticagrelor Shortness Of Breath    Isosorbide Headache       FAMILY HISTORY:  Family History   Problem Relation Age of Onset    Cerebrovascular Disease Mother     Heart Disease Mother     Cancer Father     No Known Problems Sister     Other - See Comments Brother 77.00    No Known Problems Brother     No Known Problems Sister        SOCIAL HISTORY:   Social History     Socioeconomic History    Marital status:    Tobacco Use    Smoking status: Former     Types: Cigarettes     Quit date: 11/17/2008     Years since quitting: 15.1    Smokeless tobacco: Never   Substance and Sexual Activity    Alcohol use: Never    Drug use: Not Currently    Sexual activity: Not Currently       VITALS:  /61   Pulse 71   Temp 98.8   F (37.1  C) (Temporal)   Resp 18   Wt 97.5 kg (215 lb)   SpO2 93%   BMI 32.69 kg/m      PHYSICAL EXAM    Physical Exam  Constitutional: Well developed, Well nourished, NAD, GCS 15  HENT: Normocephalic, Atraumatic, Bilateral external ears normal, Oropharynx normal, mucous membranes moist, Nose normal. Neck-  Normal range of motion, No tenderness, Supple, No stridor.    Eyes: PERRL, EOMI, Conjunctiva normal, No discharge.   Respiratory: Normal breath sounds, No respiratory distress, No wheezing or crackles, Speaks in full sentences easily.    Cardiovascular: Normal heart rate, Regular rhythm,  No murmurs, No rubs, No gallops. 2+ radial pulses bilaterally. No reproducible chest tenderness.    GI: Bowel sounds normal, Soft, No tenderness, No masses, No rebound or guarding. No CVA tenderness bilaterally    Musculoskeletal: 2+ DP pulses. No notable lower extremity edema.  No cyanosis, No clubbing. Good range of motion in all major joints. No tenderness to palpation or major deformities noted.    Integument: Warm, Dry, No erythema, No rash. No petechiae.     Neurologic: Alert & oriented x 3, 5/5 strength in all 4 extremities bilaterally. Sensation intact to light touch in all 4 extremities and the face bilaterally. No focal deficits noted.   Psychiatric: Affect normal, Judgment normal, Mood normal. Cooperative.      LAB:  All pertinent labs reviewed and interpreted.  Results for orders placed or performed during the hospital encounter of 01/24/24   CTA Chest Abdomen Pelvis w Contrast    Impression    IMPRESSION:  1.  No evidence of thoracoabdominal aortic aneurysm or dissection.  2.  Large hiatal hernia.     Comprehensive metabolic panel   Result Value Ref Range    Sodium 143 135 - 145 mmol/L    Potassium 4.1 3.4 - 5.3 mmol/L    Carbon Dioxide (CO2) 23 22 - 29 mmol/L    Anion Gap 10 7 - 15 mmol/L    Urea Nitrogen 17.7 8.0 - 23.0 mg/dL    Creatinine 0.89 0.67 - 1.17 mg/dL    GFR Estimate >90 >60 mL/min/1.73m2     Calcium 8.5 (L) 8.8 - 10.2 mg/dL    Chloride 110 (H) 98 - 107 mmol/L    Glucose 130 (H) 70 - 99 mg/dL    Alkaline Phosphatase 78 40 - 150 U/L    AST 22 0 - 45 U/L    ALT 22 0 - 70 U/L    Protein Total 6.5 6.4 - 8.3 g/dL    Albumin 3.7 3.5 - 5.2 g/dL    Bilirubin Total 0.3 <=1.2 mg/dL   Result Value Ref Range    Troponin T, High Sensitivity 7 <=22 ng/L   Nt probnp inpatient (BNP)   Result Value Ref Range    N terminal Pro BNP Inpatient <36 0 - 900 pg/mL   Result Value Ref Range    Lipase 28 13 - 60 U/L   CBC with platelets and differential   Result Value Ref Range    WBC Count 9.7 4.0 - 11.0 10e3/uL    RBC Count 5.50 4.40 - 5.90 10e6/uL    Hemoglobin 16.3 13.3 - 17.7 g/dL    Hematocrit 48.1 40.0 - 53.0 %    MCV 88 78 - 100 fL    MCH 29.6 26.5 - 33.0 pg    MCHC 33.9 31.5 - 36.5 g/dL    RDW 13.6 10.0 - 15.0 %    Platelet Count 210 150 - 450 10e3/uL    % Neutrophils 51 %    % Lymphocytes 32 %    % Monocytes 12 %    % Eosinophils 4 %    % Basophils 1 %    % Immature Granulocytes 0 %    NRBCs per 100 WBC 0 <1 /100    Absolute Neutrophils 5.0 1.6 - 8.3 10e3/uL    Absolute Lymphocytes 3.1 0.8 - 5.3 10e3/uL    Absolute Monocytes 1.1 0.0 - 1.3 10e3/uL    Absolute Eosinophils 0.4 0.0 - 0.7 10e3/uL    Absolute Basophils 0.1 0.0 - 0.2 10e3/uL    Absolute Immature Granulocytes 0.0 <=0.4 10e3/uL    Absolute NRBCs 0.0 10e3/uL   Result Value Ref Range    Troponin T, High Sensitivity <6 <=22 ng/L       RADIOLOGY:  Reviewed all pertinent imaging. Please see official radiology report.  CTA Chest Abdomen Pelvis w Contrast   Final Result   IMPRESSION:   1.  No evidence of thoracoabdominal aortic aneurysm or dissection.   2.  Large hiatal hernia.             EKG:    Performed at: 1/24/24 8:18 PM  Impression: sinus rhythm. No evidence of acute ischemia. Unchanged from prior.   Rate: 81 BPM  Rhythm: Sinus  CO Interval: 188 ms  QRS Interval: 84 ms  QTc Interval: 432 ms  Comparison: 7/17/21 no significant changes were found     Performed at:  1/24/24 9:11 PM  Impression: sinus rhythm. No evidence of acute ischemia.   Rate: 71 BPM  Rhythm: Sinus  [place] Interval: 196 ms  QRS Interval: 84 ms  QTc Interval: 404 ms  Comparison: 1/24/24 8:18 no significant changes were found     I have independently reviewed and interpreted the EKG(s) documented above.    PROCEDURES:   N/A       [employer] [facility] Documentation:   CMS Diagnoses:               I, Muriel Carrera, am serving as a scribe to document services personally performed by Wendie Dyer MD based on my observation and the provider's statements to me. I, Wendie Dyer MD, attest that Muriel Carrera is acting in a scribe capacity, has observed my performance of the services and has documented them in accordance with my direction.    Wendie Dyer MD  [facility] EMERGENCY DEPARTMENT  [address] [place] [id number]  [phone]     Wendie Dyer MD  01/24/24 [id number]

## 2024-01-25 NOTE — PLAN OF CARE
PRIMARY DIAGNOSIS: CHEST PAIN  OUTPATIENT/OBSERVATION GOALS TO BE MET BEFORE DISCHARGE:  1. Ruled out acute coronary syndrome (negative or stable Troponin):   still doing every 6 hours troponin, so far trop WNL  2. Pain Status: Pain free.  3. Appropriate provocative testing performed: No plan to do more testing tomorrow after seen by cardiologist tomorrow  - Stress Test Procedure: not done yet  - Interpretation of cardiac rhythm per telemetry tech: NSR    4. Cleared by Consultants (if applicable):No  5. Return to near baseline physical activity: No  Discharge Planner Nurse   Safe discharge environment identified: No  Barriers to discharge: Yes    Writer received this pt at 2330. Pt alert and oriented times 4. Vitals stable. He is on RA and has been 94-98% on room air. Denied any chest pain. However, he still has numbness on his left arm and shoulder. Tele NSR. Last trop was done at 2230 and was 6. Clarified with MD regarding troponin for midnight since it was recently done at 2230. MD said to do every 6 hours from 2230, which will be 0430. Lab notified. Pt was given some snack per request.        Entered by: Boris Sr RN 01/25/2024 1:34 AM     Please review provider order for any additional goals.   Nurse to notify provider when observation goals have been met and patient is ready for discharge.  Goal Outcome Evaluation:      Plan of Care Reviewed With: patient

## 2024-01-25 NOTE — UTILIZATION REVIEW
Concurrent stay review; Secondary Review Determination     Under the authority of the Utilization Management Committee, the utilization review process indicated a secondary review on Bertrand Palacios.  The review outcome is based on review of the medical records, discussions with staff, and applying clinical experience noted on the date of the review.        (x) Observation Status Appropriate - Concurrent stay review    RATIONALE FOR DETERMINATION   Bertrand Palacios is a 68 yr old male with CAD s/p PCIx7, HTN, bladder cancer who presented with chest pain.  Cardiology concern for unstable angina and planned angiogram.  Patent stents and similar to prior angiograms.  Recommended to continue aggressive risk factor modification.      Patient is clinically improving and there is no clear indication to change patient's status to inpatient. The severity of illness, intensity of service provided, expected LOS and risk for adverse outcome make the care appropriate for observation.    The information on this document is developed by the utilization review team in order for the business office to ensure compliance.  This only denotes the appropriateness of proper admission status and does not reflect the quality of care rendered.         The definitions of Inpatient Status and Observation Status used in making the determination above are those provided in the CMS Coverage Manual, Chapter 1 and Chapter 6, section 70.4.      Sincerely,   Anay Tello MD  Utilization Review  Physician Advisor  [facility]

## 2024-01-25 NOTE — PROGRESS NOTES
Transfer to [facility] from ER.  Prep for coronary angiogram.  No questions.

## 2024-01-25 NOTE — H&P
[facility]    History and Physical - Hospitalist Service       Date of Admission:  1/24/2024    Assessment & Plan   Bertrand Palacios is a 68 year old male with a medical history of CAD s/p PCI x7, hypertension, bladder cancer, bladder cancer and other medical history who is being admitted for chest pain for further evaluation.    Patient Active Problem List   Diagnosis    CAD (coronary artery disease)    Essential hypertension, benign    Dyslipidemia, goal LDL below 70    Status post coronary angiogram    Dyspnea on exertion    Chest pain, unspecified type    Benign essential hypertension    Bladder cancer (H)       Chest pain- History suggestive of CAD. R/o ACS    Hypertension- resume home medications    CAD- s/p stent placement x7    Hyperlipidemia-statin    Glaucoma- resume medications      Diet:  cardiac  DVT Prophylaxis: Pneumatic Compression Devices  Leal Catheter: Not present  Lines: None     Cardiac Monitoring: None  Code Status:  Full code    Clinically Significant Risk Factors Present on Admission                # Drug Induced Platelet Defect: home medication list includes an antiplatelet medication   # Hypertension: Noted on problem list                 Disposition Plan    f       Linda Lennon MD  Hospitalist Service  [facility]  Securely message with [employer] (more info)  Text page via [employer] Paging/Directory     ______________________________________________________________________    Chief Complaint   Chest pain        History of Present Illness   Bertrand Palacios is a 68 year old male with a medical history of CAD s/p PCI x7, hypertension, bladder cancer, bladder cancer and other medical history who is being admitted for chest pain for further evaluation.      Per history, patient presented to the ER with left sided chest pain with numbness going down the left arm, neck and shoulder. Patient complained of associated exertional shortness of breath. Patient  is a former smoker.     BMP done showed Na- 143, K- 4.1, CBC- within normal limits. Ct abd and pelvis done showed:    LUNGS AND PLEURA: Moderate centrilobular emphysema with an upper lobe predominance. No CHF, lobar consolidation or effusion.     MEDIASTINUM/AXILLAE: Large hiatal hernia.     CORONARY ARTERY CALCIFICATION: Previous intervention (stents or CABG).     HEPATOBILIARY: Cholecystectomy.     PANCREAS: Unremarkable     SPLEEN: Unremarkable     ADRENAL GLANDS: Unremarkable     KIDNEYS/BLADDER: No hydronephrosis     BOWEL: Normal caliber appendix. No bowel obstruction.     LYMPH NODES: No significant retroperitoneal adenopathy     PELVIC ORGANS: Moderate prostatic hypertrophy. No free fluid.     MUSCULOSKELETAL: Small fat-containing inguinal and umbilical hernias. 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                                                                      IMPRESSION:  1.  No evidence of thoracoabdominal aortic aneurysm or dissection.  2.  Large hiatal hernia.    EKG done  did not show any concerning changes. Troponin did not show any new as within normal limits.     Patient is being admitted for in patient cares.     Past Medical History    Past Medical History:   Diagnosis Date    Cancer (H)     bladder    Coronary artery disease     S/P PCI in 2006 Per H&P    Hiatal hernia     Per H&P    History of ASCVD     per H&P    Hypertension        Past Surgical History   Past Surgical History:   Procedure Laterality Date    BLADDER SURGERY  2008    tumor     CARDIAC CATHETERIZATION      2006 Per H&P    CV CORONARY ANGIOGRAM N/A 3/6/2020    Procedure: Coronary Angiogram;  Surgeon: Tony Juárez MD;  Location: [facility] Cath Lab;  Service: Cardiology    CV HEART CATHETERIZATION WITH POSSIBLE INTERVENTION N/A 7/15/2021    Procedure: Coronary Angiogram;  Surgeon: Tony Juárez MD;  Location: [facility] CATH LAB CV    CV PCI N/A 7/15/2021    Procedure: PERCUTANEOUS CORONARY INTERVENTION;  Surgeon:  Tony Juárez MD;  Location: [facility] LAB CV    IR MISCELLANEOUS PROCEDURE  11/9/2006       Prior to Admission Medications   Prior to Admission Medications   Prescriptions Last Dose Informant Patient Reported? Taking?   B Complex Vitamins (B COMPLEX PO) 1/24/2024 at am Self Yes Yes   Sig: Take 1 capsule by mouth daily   Cholecalciferol (VITAMIN D3 PO)  Self Yes No   Sig: Take 1 tablet by mouth daily   aspirin 81 MG EC tablet 1/24/2024 at am Self Yes Yes   Sig: [ASPIRIN 81 MG EC TABLET] Take 81 mg by mouth daily.   clopidogrel (PLAVIX) 75 MG tablet 1/24/2024 at am Self Yes Yes   Sig: Take 75 mg by mouth daily   gabapentin (NEURONTIN) 100 MG capsule Unknown at prn Self Yes Yes   Sig: Take 100 mg by mouth 3 times daily as needed for neuropathic pain   latanoprost (XALATAN) 0.005 % ophthalmic solution 1/24/2024 at hs Self Yes Yes   Sig: Place 1 drop into both eyes at bedtime   lisinopril (PRINIVIL,ZESTRIL) 2.5 MG tablet 1/24/2024 at am Self Yes Yes   Sig: [LISINOPRIL (PRINIVIL,ZESTRIL) 2.5 MG TABLET] Take 2.5 mg by mouth daily.   metoprolol succinate (TOPROL-XL) 50 MG 24 hr tablet 1/24/2024 at am Self No Yes   Sig: [METOPROLOL SUCCINATE (TOPROL-XL) 50 MG 24 HR TABLET] Take 1 tablet (50 mg total) by mouth daily.   nitroglycerin (NITROSTAT) 0.4 MG SL tablet 1/23/2024 at prn Self No Yes   Sig: [NITROGLYCERIN (NITROSTAT) 0.4 MG SL TABLET] PLACE 1 TABLET UNDER THE TONGUE EVERY 5MIN AS NEEDED FOR CHEST PAIN. IF SYMPTOMS DO NOT RESOLVE IN 5MIN, REPEAT DOSE AND CALL 911   omeprazole (PRILOSEC) 20 MG capsule 1/24/2024 at am Self Yes Yes   Sig: Take 20 mg by mouth 2 times daily (before meals)    rosuvastatin (CRESTOR) 20 MG tablet 1/24/2024 at am Self Yes Yes   Sig: Take 20 mg by mouth daily      Facility-Administered Medications: None        Review of Systems    Review of systems not obtained due to patient factors -     Social History   I have reviewed this patient's social history and updated it with pertinent  information if needed.  Social History     Tobacco Use    Smoking status: Former     Types: Cigarettes     Quit date: 11/17/2008     Years since quitting: 15.1    Smokeless tobacco: Never   Substance Use Topics    Alcohol use: Never    Drug use: Not Currently         Family History   I have reviewed this patient's family history and updated it with pertinent information if needed.  Family History   Problem Relation Age of Onset    Cerebrovascular Disease Mother     Heart Disease Mother     Cancer Father     No Known Problems Sister     Other - See Comments Brother 77.00    No Known Problems Brother     No Known Problems Sister         Physical Exam   Vital Signs: Temp: 98.8  F (37.1  C) Temp src: Temporal BP: 136/60 Pulse: 77   Resp: 19 SpO2: 95 % O2 Device: None (Room air)    Weight: 215 lbs 0 oz      General Aox3, appropriate affect, NAD, on RA  HEENT  MMM, EOMI, PERRL  Chest Adeq E b/l, No wheezing  Heart RRR, No M/R/G  Abd- Soft, NT, BS+  - Deferred,   Extremity- Moving all extremities, No digital clubbing,   No edema  Neuro- Aox3, grossly non focal     Medical Decision Making       85 MINUTES SPENT BY ME on the date of service doing chart review, history, exam, documentation & further activities per the note.      ------------------ MEDICAL DECISION MAKING ------------------------------------------------------------------------------------------------------      Data   ------------------------- PAST 24 HR DATA REVIEWED -----------------------------------------------    I have personally reviewed the following data over the past 24 hrs:    9.7  \   16.3   / 210     143 110 (H) 17.7 /  130 (H)   4.1 23 0.89 \     ALT: 22 AST: 22 AP: 78 TBILI: 0.3   ALB: 3.7 TOT PROTEIN: 6.5 LIPASE: 28     Trop: 7 BNP: <36       Imaging results reviewed over the past 24 hrs:   Recent Results (from the past 24 hour(s))   CTA Chest Abdomen Pelvis w Contrast    Narrative    EXAM: CTA CHEST ABDOMEN PELVIS W CONTRAST  LOCATION: [facility]  DATE: 1/24/2024    INDICATION: chest pain with numbness into arm, eval for dissection  COMPARISON: None.  TECHNIQUE: CT angiogram chest abdomen pelvis during arterial phase of injection of IV contrast. 2D and 3D MIP reconstructions were performed by the CT technologist. Dose reduction techniques were used.   CONTRAST: isovue 370 90ml    FINDINGS:   CT ANGIOGRAM CHEST, ABDOMEN, AND PELVIS: No evidence of thoracoabdominal aortic aneurysm or dissection. Patent celiac axis, SMA, bilateral renal arteries, LINDA, and runoff to the upper legs. At least mild stenosis of the proximal celiac axis. Scattered   atherosclerotic calcification. No evidence of central pulmonary embolus.    LUNGS AND PLEURA: Moderate centrilobular emphysema with an upper lobe predominance. No CHF, lobar consolidation or effusion.    MEDIASTINUM/AXILLAE: Large hiatal hernia.    CORONARY ARTERY CALCIFICATION: Previous intervention (stents or CABG).    HEPATOBILIARY: Cholecystectomy.    PANCREAS: Unremarkable    SPLEEN: Unremarkable    ADRENAL GLANDS: Unremarkable    KIDNEYS/BLADDER: No hydronephrosis    BOWEL: Normal caliber appendix. No bowel obstruction.    LYMPH NODES: No significant retroperitoneal adenopathy    PELVIC ORGANS: Moderate prostatic hypertrophy. No free fluid.    MUSCULOSKELETAL: Small fat-containing inguinal and umbilical hernias. 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      Impression    IMPRESSION:  1.  No evidence of thoracoabdominal aortic aneurysm or dissection.  2.  Large hiatal hernia.

## 2024-01-25 NOTE — DISCHARGE SUMMARY
[facility] MEDICINE  DISCHARGE SUMMARY     Primary Care Physician: Mayela Rayo  Admission Date: 1/24/2024   Discharge Provider: MARIANGEL Reaves Discharge Date: 1/25/2024   Diet: as below   Code Status: Full Code   Activity: DCACTIVITY: Activity as tolerated        Condition at Discharge: Stable     REASON FOR PRESENTATION(See Admission Note for Details)   Chest pain    PRINCIPAL & ACTIVE DISCHARGE DIAGNOSES     Principal Problem:    Chest pain, unspecified type  Active Problems:    CAD (coronary artery disease)    Benign essential hypertension    Bladder cancer (H)    Unstable angina (H)      PENDING LABS     Unresulted Labs Ordered in the Past 30 Days of this Admission       No orders found for last 31 day(s).              PROCEDURES ( this hospitalization only)      Procedure(s):  Left Heart Catheterization  Coronary Angiogram    RECOMMENDATIONS TO OUTPATIENT PROVIDER FOR F/U VISIT     Follow-up Appointments     Follow-up and recommended labs and tests       Follow up with primary care provider, Mayela Rayo, within 7 days for   hospital follow- up.                DISPOSITION     Home    SUMMARY OF HOSPITAL COURSE:    Bertrand Palacios is a 68 year old male with a medical history of CAD s/p PCI with total of 7 stents (at different times per patient), hypertension, bladder cancer and other medical history who is being admitted for chest pain for further evaluation.        Chest pain  - In a patient with significant CAD. Concerning for unstable angina. Cardiology consulted. Coronary angiogram performed today. The overall findings were similar to prior angio from 2021. Patient LAD and RCA stents. Cont DAPT, BB and statin. Rosuvastatin dose increased from 20mg to 40mg daily  - CTA C/A/P showed no dissection. Large hiatal hernia noted. Cont PPI. Patient does report GERD symptoms. Advised to have further discussion with PCP/Gen surgeon to have the hiatal hernia fixed surgically    "  Hypertension- resume home medications     Hyperlipidemia-statin     Glaucoma- resume medications     Clinically Significant Risk Factors Present on Admission                # Drug Induced Platelet Defect: home medication list includes an antiplatelet medication   # Hypertension: Noted on problem list      # Obesity: Estimated body mass index is 32.69 kg/m  as calculated from the following:    Height as of this encounter: 1.727 m (5' 8\").    Weight as of this encounter: 97.5 kg (215 lb).           I am seeing the patient for the first time today after the procedure. He is very eager to go home. Cardiology okayed for discharge home. Will discharge home tonight once the post procedure bed rest as recommended by the cardiologist is complete by 8PM.     Discharge Medications with Med changes:     Current Discharge Medication List        CONTINUE these medications which have CHANGED    Details       !! rosuvastatin (CRESTOR) 40 MG tablet Take 1 tablet (40 mg) by mouth daily  Qty: 90 tablet, Refills: 3    Associated Diagnoses: Unstable angina (H); Essential hypertension, benign; Dyslipidemia, goal LDL below 70       !! - Potential duplicate medications found. Please discuss with provider.        CONTINUE these medications which have NOT CHANGED    Details   aspirin 81 MG EC tablet [ASPIRIN 81 MG EC TABLET] Take 81 mg by mouth daily.      B Complex Vitamins (B COMPLEX PO) Take 1 capsule by mouth daily      Cholecalciferol (VITAMIN D3 PO) Take 1 tablet by mouth daily      clopidogrel (PLAVIX) 75 MG tablet Take 75 mg by mouth daily      gabapentin (NEURONTIN) 100 MG capsule Take 100 mg by mouth 3 times daily as needed for neuropathic pain      latanoprost (XALATAN) 0.005 % ophthalmic solution Place 1 drop into both eyes at bedtime      lisinopril (PRINIVIL,ZESTRIL) 2.5 MG tablet [LISINOPRIL (PRINIVIL,ZESTRIL) 2.5 MG TABLET] Take 2.5 mg by mouth daily.      metoprolol succinate (TOPROL-XL) 50 MG 24 hr tablet [METOPROLOL " "SUCCINATE (TOPROL-XL) 50 MG 24 HR TABLET] Take 1 tablet (50 mg total) by mouth daily.  Qty: 90 tablet, Refills: 1    Associated Diagnoses: Coronary artery disease involving native coronary artery of native heart with unstable angina pectoris (H)      nitroglycerin (NITROSTAT) 0.4 MG SL tablet [NITROGLYCERIN (NITROSTAT) 0.4 MG SL TABLET] PLACE 1 TABLET UNDER THE TONGUE EVERY 5MIN AS NEEDED FOR CHEST PAIN. IF SYMPTOMS DO NOT RESOLVE IN 5MIN, REPEAT DOSE AND CALL 911  Qty: 2 Bottle, Refills: 1    Associated Diagnoses: Acute chest pain      omeprazole (PRILOSEC) 20 MG capsule Take 20 mg by mouth 2 times daily (before meals)                    Rationale for medication changes:      Please see above        Consults   Cardiology       Immunizations given this encounter     Most Recent Immunizations   Administered Date(s) Administered    COVID-19 12+ (2023-24) (MODERNA) 12/01/2023    COVID-19 MONOVALENT 12+ (Pfizer) 05/19/2021    COVID-19 Monovalent 12+ (Pfizer 2022) 08/05/2022    Flu 65+ Years 09/08/2020    Flu, Unspecified 10/17/2019    Influenza (H1N1) 03/27/2010    Influenza Vaccine 18-64 (Flublok) 10/06/2020    Influenza Vaccine 65+ (FLUAD) 01/31/2023    Influenza Vaccine 65+ (Fluzone HD) 12/01/2023    Influenza Vaccine >6 months,quad, PF 09/28/2018    Pneumo Conj 13-V (2010&after) 11/19/2019    Pneumococcal 23 valent 12/08/2020    TDAP (Adacel,Boostrix) 08/22/2018           Anticoagulation Information      Recent INR results: No results for input(s): \"INR\" in the last 168 hours.  Warfarin doses (if applicable) or name of other anticoagulant: NA      SIGNIFICANT IMAGING FINDINGS     Results for orders placed or performed during the hospital encounter of 01/24/24   CTA Chest Abdomen Pelvis w Contrast    Impression    IMPRESSION:  1.  No evidence of thoracoabdominal aortic aneurysm or dissection.  2.  Large hiatal hernia.         SIGNIFICANT LABORATORY FINDINGS     Most Recent 3 CBC's:  Recent Labs   Lab Test " "01/25/24 0445 01/24/24 2036 07/17/21  0121   WBC 10.1 9.7 13.9*   HGB 15.6 16.3 15.7   MCV 88 88 88    210 185     Most Recent 3 BMP's:  Recent Labs   Lab Test 01/25/24 0445 01/24/24 2036 07/17/21  0121    143 140   POTASSIUM 4.1 4.1 4.2   CHLORIDE 109* 110* 111*   CO2 24 23 22   BUN 18.9 17.7 15   CR 0.97 0.89 0.82   ANIONGAP 8 10 7   LESLIE 8.6* 8.5* 8.7   * 130* 137*     Most Recent 2 LFT's:  Recent Labs   Lab Test 01/25/24 0445 01/24/24 2036   AST 17 22   ALT 20 22   ALKPHOS 71 78   BILITOTAL 0.4 0.3     Most Recent 3 INR's:No lab results found.  Most Recent 3 Troponin's:No lab results found.      Discharge Orders        Reason for your hospital stay    Chest pain     Follow-up and recommended labs and tests     Follow up with primary care provider, Mayela Rayo, within 7 days for hospital follow- up.     Activity    Your activity upon discharge: activity as tolerated     Diet    Follow this diet upon discharge: Orders Placed This Encounter      Advance Diet as Tolerated: Cardiac diet       Examination   /56   Pulse 68   Temp 97.9  F (36.6  C) (Oral)   Resp 20   Ht 1.727 m (5' 8\")   Wt 97.5 kg (215 lb)   SpO2 96%   BMI 32.69 kg/m        General: Not in obvious distress.  HEENT: NC, AT   Chest: Clear to auscultation bilaterally  Heart: S1S2 normal, regular. No M/R/G  Abdomen: Soft. NT, ND. Bowel sounds- active.  Extremities: No legs swelling  Neuro: Alert and awake, grossly non-focal      Please see EMR for more detailed significant labs, imaging, consultant notes etc.    I, MARIANGEL Reaves, personally saw the patient today and spent greater than 30 minutes discharging this patient.    MARIANGEL Reaves  [facility]    CC:Mayela Rayo                  "

## 2024-01-25 NOTE — CONSULTS
Care Management Initial Consult    General Information  Assessment completed with: Patient, pt  Type of CM/SW Visit: CM Role Introduction    Primary Care Provider verified and updated as needed: Yes   Readmission within the last 30 days: no previous admission in last 30 days      Reason for Consult: discharge planning  Advance Care Planning: Advance Care Planning Reviewed: verified with patient, no concerns identified     General Information Comments: lives w/friend and no svcs, ind at baseline    Communication Assessment  Patient's communication style: spoken language (English or Bilingual)             Cognitive  Cognitive/Neuro/Behavioral: WDL  Level of Consciousness: alert  Arousal Level: opens eyes spontaneously  Orientation: oriented x 4     Best Language: 0 - No aphasia  Speech: clear    Living Environment:   People in home: alone, friend(s)     Current living Arrangements: house      Able to return to prior arrangements: yes       Family/Social Support:  Care provided by: self  Provides care for: no one  Marital Status: Single  Significant Other          Description of Support System: Supportive, Involved    Support Assessment: Adequate social supports, Adequate family and caregiver support    Current Resources:   Patient receiving home care services: No     Community Resources: None  Equipment currently used at home:    Supplies currently used at home: None    Employment/Financial:  Employment Status: self-employed        Financial Concerns: none   Referral to Financial Worker: No       Does the patient's insurance plan have a 3 day qualifying hospital stay waiver?  No    Lifestyle & Psychosocial Needs:  Social Determinants of Health     Food Insecurity: Not on file   Depression: Not on file   Housing Stability: Not on file   Tobacco Use: Medium Risk (1/24/2024)    Patient History     Smoking Tobacco Use: Former     Smokeless Tobacco Use: Never     Passive Exposure: Not on file   Financial Resource Strain:  Not on file   Alcohol Use: Not on file   Transportation Needs: Not on file   Physical Activity: Not on file   Interpersonal Safety: Not on file   Stress: Not on file   Social Connections: Not on file       Functional Status:  Prior to admission patient needed assistance:   Dependent ADLs:: Independent  Dependent IADLs:: Independent  Assesssment of Functional Status: Not at baseline with ADL Functioning    Mental Health Status:  Mental Health Status: No Current Concerns       Chemical Dependency Status:                Values/Beliefs:  Spiritual, Cultural Beliefs, Jain Practices, Values that affect care:                 Additional Information:  Assessed, lives w/friend and no svcs, ind at baseline. No CM needs identified. Friend to transport    Jing Rayo RN

## 2024-01-25 NOTE — PRE-PROCEDURE
GENERAL PRE-PROCEDURE:   Procedure:  Coronary angiogram with possible PCI  Date/Time:  1/25/2024 12:48 PM    Written consent obtained?: Yes    Risks and benefits: Risks, benefits and alternatives were discussed    Consent given by:  Patient  Patient states understanding of procedure being performed: Yes    Patient's understanding of procedure matches consent: Yes    Procedure consent matches procedure scheduled: Yes    Expected level of sedation:  Moderate  Appropriately NPO:  Yes  ASA Class:  3 (chest pain/UA, CAD s/p mulitple PCIs with hx of ISR, HTN, dyslipidemia)  Mallampati  :  Grade 1- soft palate, uvula, tonsillar pillars, and posterior pharyngeal wall visible  Lungs:  Lungs clear with good breath sounds bilaterally  Heart:  Normal heart sounds and rate  History & Physical reviewed:  History and physical reviewed and updates made (see comment)  H&P Comments:  Clinically Significant Risk Factors Present on Admission    Cardiovascular : chest pain/UA, CAD with hx of multiple PCIs and ISR of LAD; last PCI to pRCA and rPDA 7/2021, HTN, dyslipidemia    Fluid & Electrolyte Disorders : Not present on admission    Gastroenterology : Not present on admission    Hematology/Oncology : Not present on admission    Nephrology : Not present on admission    Neurology : Not present on admission    Pulmonology : Not present on admission    Systemic : Not present on admission      Statement of review:  I have reviewed the lab findings, diagnostic data, medications, and the plan for sedation

## 2024-01-25 NOTE — MEDICATION SCRIBE - ADMISSION MEDICATION HISTORY
Medication Scribe Admission Medication History    Admission medication history is complete. The information provided in this note is only as accurate as the sources available at the time of the update.    Information Source(s): Patient via in-person    Pertinent Information: Patient states that he took all of his AM doses today and bedtime Latanoprost eye drop.    Changes made to PTA medication list:  Added: Clopidogrel 75 mg tablet, Latanoprost 0.005% eye drop (per patient and fill history)  Deleted: None  Changed: None    Medication Affordability:       Allergies reviewed with patient and updates made in EHR: yes    Medication History Completed By: Fito Fairbanks 1/24/2024 9:53 PM    PTA Med List   Medication Sig Last Dose    aspirin 81 MG EC tablet [ASPIRIN 81 MG EC TABLET] Take 81 mg by mouth daily. 1/24/2024 at am    B Complex Vitamins (B COMPLEX PO) Take 1 capsule by mouth daily 1/24/2024 at am    Cholecalciferol (VITAMIN D3 PO) Take 1 tablet by mouth daily 1/24/2024 at am    clopidogrel (PLAVIX) 75 MG tablet Take 75 mg by mouth daily 1/24/2024 at am    gabapentin (NEURONTIN) 100 MG capsule Take 100 mg by mouth 3 times daily as needed for neuropathic pain Unknown at prn    latanoprost (XALATAN) 0.005 % ophthalmic solution Place 1 drop into both eyes at bedtime 1/24/2024 at hs    lisinopril (PRINIVIL,ZESTRIL) 2.5 MG tablet [LISINOPRIL (PRINIVIL,ZESTRIL) 2.5 MG TABLET] Take 2.5 mg by mouth daily. 1/24/2024 at am    metoprolol succinate (TOPROL-XL) 50 MG 24 hr tablet [METOPROLOL SUCCINATE (TOPROL-XL) 50 MG 24 HR TABLET] Take 1 tablet (50 mg total) by mouth daily. 1/24/2024 at am    nitroglycerin (NITROSTAT) 0.4 MG SL tablet [NITROGLYCERIN (NITROSTAT) 0.4 MG SL TABLET] PLACE 1 TABLET UNDER THE TONGUE EVERY 5MIN AS NEEDED FOR CHEST PAIN. IF SYMPTOMS DO NOT RESOLVE IN 5MIN, REPEAT DOSE AND CALL 911 1/23/2024 at prn    omeprazole (PRILOSEC) 20 MG capsule Take 20 mg by mouth 2 times daily (before meals)   1/24/2024 at am    rosuvastatin (CRESTOR) 20 MG tablet Take 20 mg by mouth daily 1/24/2024 at am

## 2024-01-25 NOTE — PLAN OF CARE
Problem: Adult Inpatient Plan of Care  Goal: Absence of Hospital-Acquired Illness or Injury  Intervention: Identify and Manage Fall Risk  Recent Flowsheet Documentation  Taken 1/25/2024 1617 by Kristin Carrillo RN  Safety Promotion/Fall Prevention:   activity supervised   room door open   room near nurse's station   clutter free environment maintained   increased rounding and observation   lighting adjusted   nonskid shoes/slippers when out of bed   supervised activity   Goal Outcome Evaluation:             Received from cath lab right groin site clean dry and intact, vitals sign stable, heart rhythm normal sinus rhythm, wanting  to discharge home tonight, discussed with hospitalist and cardiology and since angio negative, okay for patient to discharge home tonight, discuss discharge instruction and angio scare instruction, all questions answered and patient verbalizes understanding

## 2024-01-25 NOTE — PLAN OF CARE
Vitally stable denies chest pain, left arm numbness resolved, TELE: NSR.  Placed on 2L O2 via nasal cannula while asleep for intermittent drop in sats 87-89%.    Goal Outcome Evaluation:  Problem: Adult Inpatient Plan of Care  Goal: Optimal Comfort and Wellbeing  Outcome: Progressing     Problem: Pain Acute  Goal: Optimal Pain Control and Function  Outcome: Progressing  Intervention: Optimize Psychosocial Wellbeing  Recent Flowsheet Documentation  Taken 1/25/2024 0315 by Latrice Nickerson RN  Supportive Measures: active listening utilized     Problem: Chest Pain  Goal: Resolution of Chest Pain Symptoms  Outcome: Progressing  Intervention: Manage Acute Chest Pain  Recent Flowsheet Documentation  Taken 1/25/2024 0315 by Latrice Nickerson, RN  Chest Pain Intervention: cardiac monitoring continued

## 2024-01-26 NOTE — PLAN OF CARE
Goal Outcome Evaluation:       Pt discharged home with daughter. R groin site intact. All belongings sent with patient. Discharge instructions received, no other questions.

## 2024-02-04 LAB
ATRIAL RATE - MUSE: 71 BPM
ATRIAL RATE - MUSE: 81 BPM
DIASTOLIC BLOOD PRESSURE - MUSE: 64 MMHG
DIASTOLIC BLOOD PRESSURE - MUSE: NORMAL MMHG
INTERPRETATION ECG - MUSE: NORMAL
INTERPRETATION ECG - MUSE: NORMAL
P AXIS - MUSE: 60 DEGREES
P AXIS - MUSE: 67 DEGREES
PR INTERVAL - MUSE: 188 MS
PR INTERVAL - MUSE: 196 MS
QRS DURATION - MUSE: 84 MS
QRS DURATION - MUSE: 84 MS
QT - MUSE: 372 MS
QT - MUSE: 372 MS
QTC - MUSE: 404 MS
QTC - MUSE: 432 MS
R AXIS - MUSE: 26 DEGREES
R AXIS - MUSE: 38 DEGREES
SYSTOLIC BLOOD PRESSURE - MUSE: 136 MMHG
SYSTOLIC BLOOD PRESSURE - MUSE: NORMAL MMHG
T AXIS - MUSE: 54 DEGREES
T AXIS - MUSE: 61 DEGREES
VENTRICULAR RATE- MUSE: 71 BPM
VENTRICULAR RATE- MUSE: 81 BPM

## 2024-02-16 ENCOUNTER — OFFICE VISIT (OUTPATIENT)
Dept: CARDIOLOGY | Facility: CLINIC | Age: 69
End: 2024-02-16
Attending: INTERNAL MEDICINE
Payer: COMMERCIAL

## 2024-02-16 VITALS
BODY MASS INDEX: 33.65 KG/M2 | WEIGHT: 222 LBS | RESPIRATION RATE: 16 BRPM | DIASTOLIC BLOOD PRESSURE: 70 MMHG | HEIGHT: 68 IN | HEART RATE: 66 BPM | SYSTOLIC BLOOD PRESSURE: 116 MMHG

## 2024-02-16 DIAGNOSIS — I25.10 CORONARY ARTERY DISEASE INVOLVING NATIVE CORONARY ARTERY OF NATIVE HEART WITHOUT ANGINA PECTORIS: Primary | ICD-10-CM

## 2024-02-16 PROCEDURE — 99214 OFFICE O/P EST MOD 30 MIN: CPT | Performed by: INTERNAL MEDICINE

## 2024-02-16 PROCEDURE — G2211 COMPLEX E/M VISIT ADD ON: HCPCS | Performed by: INTERNAL MEDICINE

## 2024-02-16 NOTE — PROGRESS NOTES
HEART CARE NOTE          Assessment/Recommendations   1. CAD  Assessment / Plan  S/p multiple stents with the most recent being PCI to the prox RCA, rPDA x2 with EDNA on 7/15/21  Patient denies any chest pain or anginal equivalents since intervention  Continue ASA, lisinopril, metoprolol succinate, rosuvastatin - no changes to regimen at this time    2. HLP  Assessment / Plan  Continue rosuvastatin      30 minutes spent reviewing prior records (including documentation, laboratory studies, cardiac testing/imaging), history and physical exam, planning, and subsequent documentation.    The longitudinal plan of care for CAD was addressed during this visit. Due to the added complexity in care, I will continue to support Mr. Bertrand Palacios in the subsequent management of this condition(s) and with the ongoing continuity of care of this condition(s).      History of Present Illness/Subjective    Mr. Bertrand Palacios is a 64 y.o. male with a PMHx significant for  HLP (on simvastatin), CAD s/p PCI (most recent PCI to the LAD on 3/6/20) and multiple stents (on ASA, ACE-I, statin) who presents to [facility] clinic for follow-up visit.     Today, Mr. Palacios denies any acute cardiac events or complaints; Management plan as detailed above.       ECG: Personally reviewed. normal EKG, normal sinus rhythm, unchanged from previous tracings.     Coronary angiogram:  Prox RCA to Mid RCA lesion is 60% stenosed. Lesion successfully treated with 3.0 drug-eluting stent with wide patency  RPDA-1 lesion is 80% stenosed. Lesion successfully treated with 2.25 drug-eluting stent with wide patency  RPDA-2 lesion is 70% stenosed. Lesion successfully treated with 2.25 drug-eluting stent with wide patency  Previously stents in proximal and mid LAD are widely patent  Attempt to access right radial artery unsuccessful no complication  Right femoral access successful with closure using VIP Angio-Seal device without complication     Echo:  The left  "ventricle is normal in size. Left ventricular systolic performance is at the lower limits of normal. The ejection fraction is estimated to be 50%.    There is moderate distal lateral and distal anterior hypokinesis.  No significant valvular heart disease is identified on this study.   Normal right ventricular size and systolic performance.     Lab results: personally reviewed February 16, 2024; notable for renal function and electrolytes wnl    Medical history and pertinent documents reviewed in Care Everywhere please where applicable see details above        Physical Examination Review of Systems   /70 (BP Location: Left arm, Patient Position: Sitting, Cuff Size: Adult Regular)   Pulse 66   Resp 16   Ht 1.727 m (5' 8\")   Wt 100.7 kg (222 lb)   BMI 33.75 kg/m    Body mass index is 33.75 kg/m .  Wt Readings from Last 3 Encounters:   02/16/24 100.7 kg (222 lb)   01/24/24 97.5 kg (215 lb)   03/03/23 98.9 kg (218 lb)     General Appearance:   no distress, normal body habitus   ENT/Mouth: membranes moist, no oral lesions or bleeding gums.      EYES:  no scleral icterus, normal conjunctivae   Neck: no carotid bruits or thyromegaly   Chest/Lungs:   lungs are clear to auscultation, no rales or wheezing, equal chest wall expansion    Cardiovascular:   Regular. Normal first and second heart sounds with no murmurs, rubs, or gallops; the carotid, radial and posterior tibial pulses are intact, no JVD or LE edema bilaterally    Abdomen:  no organomegaly, masses, bruits, or tenderness; bowel sounds are present   Extremities: no cyanosis or clubbing   Skin: no xanthelasma, warm.    Neurologic: NAD     Psychiatric: alert and oriented x3, calm     A complete 10 systems ROS was reviewed  And is negative except what is listed in the HPI.          Medical History  Surgical History Family History Social History   Past Medical History:   Diagnosis Date    Cancer (H)     bladder    Coronary artery disease     S/P PCI in 2006 Per " H&P    Hiatal hernia     Per H&P    History of ASCVD     per H&P    Hypertension     Past Surgical History:   Procedure Laterality Date    BLADDER SURGERY  2008    tumor     CARDIAC CATHETERIZATION      2006 Per H&P    CV CORONARY ANGIOGRAM N/A 3/6/2020    Procedure: Coronary Angiogram;  Surgeon: Tony Juárez MD;  Location: [facility] Cath Lab;  Service: Cardiology    CV CORONARY ANGIOGRAM N/A 1/25/2024    Procedure: Coronary Angiogram;  Surgeon: Christopher Hogan MD;  Location: [facility] CATH LAB CV    CV HEART CATHETERIZATION WITH POSSIBLE INTERVENTION N/A 7/15/2021    Procedure: Coronary Angiogram;  Surgeon: Tony Juárez MD;  Location: [facility] CATH LAB CV    CV LEFT HEART CATH N/A 1/25/2024    Procedure: Left Heart Catheterization;  Surgeon: Christopher Hogan MD;  Location: [facility] LAB CV    CV PCI N/A 7/15/2021    Procedure: PERCUTANEOUS CORONARY INTERVENTION;  Surgeon: Tony Juárez MD;  Location: [facility] CV    IR MISCELLANEOUS PROCEDURE  11/9/2006    no family history of premature coronary artery disease Social History     Socioeconomic History    Marital status:      Spouse name: Not on file    Number of children: Not on file    Years of education: Not on file    Highest education level: Not on file   Occupational History    Not on file   Tobacco Use    Smoking status: Former     Types: Cigarettes     Quit date: 11/17/2008     Years since quitting: 15.2    Smokeless tobacco: Never   Substance and Sexual Activity    Alcohol use: Never    Drug use: Not Currently    Sexual activity: Not Currently   Other Topics Concern    Not on file   Social History Narrative    Not on file     Social Determinants of Health     Financial Resource Strain: Not on file   Food Insecurity: Not on file   Transportation Needs: Not on file   Physical Activity: Not on file   Stress: Not on file   Social Connections: Not on file   Interpersonal Safety: Not on file   Housing Stability: Not on  file           Lab Results    Chemistry/lipid CBC Cardiac Enzymes/BNP/TSH/INR   Lab Results   Component Value Date    CHOL 126 07/15/2021    HDL 42 07/15/2021    TRIG 136 07/15/2021    BUN 18.9 01/25/2024     01/25/2024    CO2 24 01/25/2024    Lab Results   Component Value Date    WBC 10.1 01/25/2024    HGB 15.6 01/25/2024    HCT 46.7 01/25/2024    MCV 88 01/25/2024     01/25/2024    Lab Results   Component Value Date    TROPONINI 0.08 07/17/2021     Lab Results   Component Value Date    TROPONINI 0.08 07/17/2021          Weight:    Wt Readings from Last 3 Encounters:   01/24/24 97.5 kg (215 lb)   03/03/23 98.9 kg (218 lb)   02/01/22 95.7 kg (211 lb)       Allergies  Allergies   Allergen Reactions    Ticagrelor Shortness Of Breath    Isosorbide Headache         Surgical History  Past Surgical History:   Procedure Laterality Date    BLADDER SURGERY  2008    tumor     CARDIAC CATHETERIZATION      2006 Per H&P    CV CORONARY ANGIOGRAM N/A 3/6/2020    Procedure: Coronary Angiogram;  Surgeon: Tony Juárez MD;  Location: [facility] Cath Lab;  Service: Cardiology    CV CORONARY ANGIOGRAM N/A 1/25/2024    Procedure: Coronary Angiogram;  Surgeon: Christopher Hogan MD;  Location: [facility] LAB CV    CV HEART CATHETERIZATION WITH POSSIBLE INTERVENTION N/A 7/15/2021    Procedure: Coronary Angiogram;  Surgeon: Tony Juárez MD;  Location: [facility] CATH LAB CV    CV LEFT HEART CATH N/A 1/25/2024    Procedure: Left Heart Catheterization;  Surgeon: Christopher Hogan MD;  Location: [facility] LAB CV    CV PCI N/A 7/15/2021    Procedure: PERCUTANEOUS CORONARY INTERVENTION;  Surgeon: Tony Juárez MD;  Location: [facility] CV    IR MISCELLANEOUS PROCEDURE  11/9/2006       Social History  Tobacco:   History   Smoking Status    Former    Types: Cigarettes    Quit date: 11/17/2008   Smokeless Tobacco    Never    Alcohol:   Social History    Substance and Sexual Activity      Alcohol use:  Never   Illicit Drugs:   History   Drug Use Unknown       Family History  Family History   Problem Relation Age of Onset    Cerebrovascular Disease Mother     Heart Disease Mother     Cancer Father     No Known Problems Sister     Other - See Comments Brother 77.00    No Known Problems Brother     No Known Problems Sister           Ana-Nayla Choi MD on 2/16/2024      cc: Mayela Rayo

## 2024-02-16 NOTE — LETTER
2/16/2024    Mayela Rayo MD  [address]  [place] [address]    RE: Bertrand Palacios       Dear Colleague,     I had the pleasure of seeing Bertrand Palacios in the [facility].    HEART CARE NOTE          Assessment/Recommendations   1. CAD  Assessment / Plan  S/p multiple stents with the most recent being PCI to the prox RCA, rPDA x2 with EDNA on 7/15/21  Patient denies any chest pain or anginal equivalents since intervention  Continue ASA, lisinopril, metoprolol succinate, rosuvastatin - no changes to regimen at this time    2. HLP  Assessment / Plan  Continue rosuvastatin      30 minutes spent reviewing prior records (including documentation, laboratory studies, cardiac testing/imaging), history and physical exam, planning, and subsequent documentation.    The longitudinal plan of care for CAD was addressed during this visit. Due to the added complexity in care, I will continue to support Mr. Bertrand Palacios in the subsequent management of this condition(s) and with the ongoing continuity of care of this condition(s).      History of Present Illness/Subjective    Mr. Bertrand Palacios is a 64 y.o. male with a PMHx significant for  HLP (on simvastatin), CAD s/p PCI (most recent PCI to the LAD on 3/6/20) and multiple stents (on ASA, ACE-I, statin) who presents to [facility] Care clinic for follow-up visit.     Today, Mr. Palacios denies any acute cardiac events or complaints; Management plan as detailed above.       ECG: Personally reviewed. normal EKG, normal sinus rhythm, unchanged from previous tracings.     Coronary angiogram:  Prox RCA to Mid RCA lesion is 60% stenosed. Lesion successfully treated with 3.0 drug-eluting stent with wide patency  RPDA-1 lesion is 80% stenosed. Lesion successfully treated with 2.25 drug-eluting stent with wide patency  RPDA-2 lesion is 70% stenosed. Lesion successfully treated with 2.25 drug-eluting stent with wide patency  Previously stents in proximal and mid  "LAD are widely patent  Attempt to access right radial artery unsuccessful no complication  Right femoral access successful with closure using VIP Angio-Seal device without complication     Echo:  The left ventricle is normal in size. Left ventricular systolic performance is at the lower limits of normal. The ejection fraction is estimated to be 50%.    There is moderate distal lateral and distal anterior hypokinesis.  No significant valvular heart disease is identified on this study.   Normal right ventricular size and systolic performance.     Lab results: personally reviewed February 16, 2024; notable for renal function and electrolytes wnl    Medical history and pertinent documents reviewed in Care Everywhere please where applicable see details above        Physical Examination Review of Systems   /70 (BP Location: Left arm, Patient Position: Sitting, Cuff Size: Adult Regular)   Pulse 66   Resp 16   Ht 1.727 m (5' 8\")   Wt 100.7 kg (222 lb)   BMI 33.75 kg/m    Body mass index is 33.75 kg/m .  Wt Readings from Last 3 Encounters:   02/16/24 100.7 kg (222 lb)   01/24/24 97.5 kg (215 lb)   03/03/23 98.9 kg (218 lb)     General Appearance:   no distress, normal body habitus   ENT/Mouth: membranes moist, no oral lesions or bleeding gums.      EYES:  no scleral icterus, normal conjunctivae   Neck: no carotid bruits or thyromegaly   Chest/Lungs:   lungs are clear to auscultation, no rales or wheezing, equal chest wall expansion    Cardiovascular:   Regular. Normal first and second heart sounds with no murmurs, rubs, or gallops; the carotid, radial and posterior tibial pulses are intact, no JVD or LE edema bilaterally    Abdomen:  no organomegaly, masses, bruits, or tenderness; bowel sounds are present   Extremities: no cyanosis or clubbing   Skin: no xanthelasma, warm.    Neurologic: NAD     Psychiatric: alert and oriented x3, calm     A complete 10 systems ROS was reviewed  And is negative except what is " listed in the HPI.          Medical History  Surgical History Family History Social History   Past Medical History:   Diagnosis Date    Cancer (H)     bladder    Coronary artery disease     S/P PCI in 2006 Per H&P    Hiatal hernia     Per H&P    History of ASCVD     per H&P    Hypertension     Past Surgical History:   Procedure Laterality Date    BLADDER SURGERY  2008    tumor     CARDIAC CATHETERIZATION      2006 Per H&P    CV CORONARY ANGIOGRAM N/A 3/6/2020    Procedure: Coronary Angiogram;  Surgeon: Tony Juárez MD;  Location: [facility] Cath Lab;  Service: Cardiology    CV CORONARY ANGIOGRAM N/A 1/25/2024    Procedure: Coronary Angiogram;  Surgeon: Christopher Hogan MD;  Location: [facility] CATH LAB CV    CV HEART CATHETERIZATION WITH POSSIBLE INTERVENTION N/A 7/15/2021    Procedure: Coronary Angiogram;  Surgeon: Tony Juárez MD;  Location: [facility] CATH LAB CV    CV LEFT HEART CATH N/A 1/25/2024    Procedure: Left Heart Catheterization;  Surgeon: Christopher Hogan MD;  Location: [facility] LAB CV    CV PCI N/A 7/15/2021    Procedure: PERCUTANEOUS CORONARY INTERVENTION;  Surgeon: Tony Juárez MD;  Location: [facility] LAB CV    IR MISCELLANEOUS PROCEDURE  11/9/2006    no family history of premature coronary artery disease Social History     Socioeconomic History    Marital status:      Spouse name: Not on file    Number of children: Not on file    Years of education: Not on file    Highest education level: Not on file   Occupational History    Not on file   Tobacco Use    Smoking status: Former     Types: Cigarettes     Quit date: 11/17/2008     Years since quitting: 15.2    Smokeless tobacco: Never   Substance and Sexual Activity    Alcohol use: Never    Drug use: Not Currently    Sexual activity: Not Currently   Other Topics Concern    Not on file   Social History Narrative    Not on file     Social Determinants of Health     Financial Resource Strain: Not on file   Food  Insecurity: Not on file   Transportation Needs: Not on file   Physical Activity: Not on file   Stress: Not on file   Social Connections: Not on file   Interpersonal Safety: Not on file   Housing Stability: Not on file           Lab Results    Chemistry/lipid CBC Cardiac Enzymes/BNP/TSH/INR   Lab Results   Component Value Date    CHOL 126 07/15/2021    HDL 42 07/15/2021    TRIG 136 07/15/2021    BUN 18.9 01/25/2024     01/25/2024    CO2 24 01/25/2024    Lab Results   Component Value Date    WBC 10.1 01/25/2024    HGB 15.6 01/25/2024    HCT 46.7 01/25/2024    MCV 88 01/25/2024     01/25/2024    Lab Results   Component Value Date    TROPONINI 0.08 07/17/2021     Lab Results   Component Value Date    TROPONINI 0.08 07/17/2021          Weight:    Wt Readings from Last 3 Encounters:   01/24/24 97.5 kg (215 lb)   03/03/23 98.9 kg (218 lb)   02/01/22 95.7 kg (211 lb)       Allergies  Allergies   Allergen Reactions    Ticagrelor Shortness Of Breath    Isosorbide Headache         Surgical History  Past Surgical History:   Procedure Laterality Date    BLADDER SURGERY  2008    tumor     CARDIAC CATHETERIZATION      2006 Per H&P    CV CORONARY ANGIOGRAM N/A 3/6/2020    Procedure: Coronary Angiogram;  Surgeon: Tony Juárez MD;  Location: [facility] Cath Lab;  Service: Cardiology    CV CORONARY ANGIOGRAM N/A 1/25/2024    Procedure: Coronary Angiogram;  Surgeon: Christopher Hogan MD;  Location: [facility] CATH LAB CV    CV HEART CATHETERIZATION WITH POSSIBLE INTERVENTION N/A 7/15/2021    Procedure: Coronary Angiogram;  Surgeon: Tony Juárez MD;  Location: [facility] CATH LAB CV    CV LEFT HEART CATH N/A 1/25/2024    Procedure: Left Heart Catheterization;  Surgeon: Christopher Hogan MD;  Location: [facility] CATH LAB CV    CV PCI N/A 7/15/2021    Procedure: PERCUTANEOUS CORONARY INTERVENTION;  Surgeon: Tony Juárez MD;  Location: [facility] CATH LAB CV    IR MISCELLANEOUS PROCEDURE  11/9/2006       Social  History  Tobacco:   History   Smoking Status    Former    Types: Cigarettes    Quit date: 11/17/2008   Smokeless Tobacco    Never    Alcohol:   Social History    Substance and Sexual Activity      Alcohol use: Never   Illicit Drugs:   History   Drug Use Unknown       Family History  Family History   Problem Relation Age of Onset    Cerebrovascular Disease Mother     Heart Disease Mother     Cancer Father     No Known Problems Sister     Other - See Comments Brother 77.00    No Known Problems Brother     No Known Problems Sister           Mela Choi MD on 2/16/2024      cc: Mayela Rayo      Thank you for allowing me to participate in the care of your patient.      Sincerely,     Mela Choi MD     [facility]  cc:   Mela Choi MD  [address] [address]

## 2024-02-20 ENCOUNTER — MEDICAL CORRESPONDENCE (OUTPATIENT)
Dept: HEALTH INFORMATION MANAGEMENT | Facility: CLINIC | Age: 69
End: 2024-02-20
Payer: COMMERCIAL

## 2024-02-20 ENCOUNTER — TRANSFERRED RECORDS (OUTPATIENT)
Dept: HEALTH INFORMATION MANAGEMENT | Facility: CLINIC | Age: 69
End: 2024-02-20
Payer: COMMERCIAL

## 2024-02-20 ENCOUNTER — TRANSCRIBE ORDERS (OUTPATIENT)
Dept: OTHER | Age: 69
End: 2024-02-20

## 2024-02-20 DIAGNOSIS — K44.9 HIATAL HERNIA: Primary | ICD-10-CM

## 2024-02-20 DIAGNOSIS — R12 HEARTBURN: ICD-10-CM

## 2024-03-12 ENCOUNTER — TELEPHONE (OUTPATIENT)
Dept: CARDIOLOGY | Facility: CLINIC | Age: 69
End: 2024-03-12
Payer: COMMERCIAL

## 2024-03-12 DIAGNOSIS — R07.9 ACUTE CHEST PAIN: Primary | ICD-10-CM

## 2024-03-12 RX ORDER — NITROGLYCERIN 0.4 MG/1
TABLET SUBLINGUAL
Qty: 90 TABLET | Refills: 1 | Status: SHIPPED | OUTPATIENT
Start: 2024-03-12

## 2024-03-12 NOTE — TELEPHONE ENCOUNTER
[facility] Call Center    Phone Message    May a detailed message be left on voicemail: yes     Reason for Call: Medication Refill Request    Has the patient contacted the pharmacy for the refill? Yes   Name of medication being requested: nitroglycerin (NITROSTAT) 0.4 MG SL tablet   Provider who prescribed the medication: Dr. Choi  Pharmacy: [id number] - [place], [place] - [facility] [address] [facility] [place] [address]  Date medication is needed: 03/12/24   Patient states that the pharmacy has not heard back on this RX x2 weeks.     Action Taken: Other: cardiology    Travel Screening: Not Applicable   Thank you!  [facility]

## 2024-03-15 ENCOUNTER — TRANSFERRED RECORDS (OUTPATIENT)
Dept: HEALTH INFORMATION MANAGEMENT | Facility: CLINIC | Age: 69
End: 2024-03-15
Payer: COMMERCIAL

## 2024-09-16 ENCOUNTER — TRANSCRIBE ORDERS (OUTPATIENT)
Dept: OTHER | Age: 69
End: 2024-09-16

## 2024-09-16 DIAGNOSIS — K44.9 HIATAL HERNIA: Primary | ICD-10-CM

## 2024-09-20 ENCOUNTER — TELEPHONE (OUTPATIENT)
Dept: SURGERY | Facility: CLINIC | Age: 69
End: 2024-09-20

## 2024-09-20 ENCOUNTER — OFFICE VISIT (OUTPATIENT)
Dept: SURGERY | Facility: CLINIC | Age: 69
End: 2024-09-20
Payer: COMMERCIAL

## 2024-09-20 VITALS — HEIGHT: 68 IN | BODY MASS INDEX: 34.25 KG/M2 | WEIGHT: 226 LBS

## 2024-09-20 DIAGNOSIS — K44.9 HIATAL HERNIA: ICD-10-CM

## 2024-09-20 PROCEDURE — 99204 OFFICE O/P NEW MOD 45 MIN: CPT | Performed by: SURGERY

## 2024-09-20 RX ORDER — ACETAMINOPHEN 325 MG/1
975 TABLET ORAL ONCE
OUTPATIENT
Start: 2024-09-20 | End: 2024-09-20

## 2024-09-20 RX ORDER — GABAPENTIN 100 MG/1
100 CAPSULE ORAL EVERY 24 HOURS
COMMUNITY
Start: 2024-03-15

## 2024-09-20 NOTE — PROGRESS NOTES
HPI: Bertrand Palacios is a 69 year old male referred to see me by Mayela Rayo for a large hiatal hernia.  He notes that he has suffered from reflux symptoms for 60 years, recalling his mother treating him with sodium bicarbonate as a child he has had a known hiatal hernia for several years, and has had care established with [facility] within the past year.  With regard to reflux symptoms he notes frequent acid reflux in spite of treatment with  omeprazole..  While this mitigates some of his symptoms, reflux symptoms are still present in spite of medication therapy.  More recently over the course of the past year he has been having increased chest pain and pressure after eating.   As part of his evaluation with [facility] he underwent an upper endoscopy in March of this year, which was noteworthy for a 9 cm hiatal hernia, no evidence of Lala's or reflux esophagitis.    His other relevant medical history is noteworthy for coronary artery disease which has been present for almost 20 years.  He has had a number of stents placed and is currently on both aspirin and Plavix.  He most recently underwent coronary angiogram in January of this year which demonstrated his previously placed stents to be widely patent, with 3 additional drug-eluting stents placed at that point in time.    Allergies:Ticagrelor and Isosorbide    Prior Medical History:   Past Medical History:   Diagnosis Date    Cancer (H)     bladder    Coronary artery disease     S/P PCI in 2006 Per H&P    Hiatal hernia     Per H&P    History of ASCVD     per H&P    Hypertension        Prior Surgical History:   Past Surgical History:   Procedure Laterality Date    BLADDER SURGERY  2008    tumor     CARDIAC CATHETERIZATION      2006 Per [facility]&P    CV CORONARY ANGIOGRAM N/A 3/6/2020    Procedure: Coronary Angiogram;  Surgeon: Tony Juárez MD;  Location: [facility];  Service: Cardiology    CV CORONARY ANGIOGRAM N/A  1/25/2024    Procedure: Coronary Angiogram;  Surgeon: Christopher Hogan MD;  Location: [facility] CV    CV HEART CATHETERIZATION WITH POSSIBLE INTERVENTION N/A 7/15/2021    Procedure: Coronary Angiogram;  Surgeon: Tony Juárez MD;  Location: [facility] CV    CV LEFT HEART CATH N/A 1/25/2024    Procedure: Left Heart Catheterization;  Surgeon: Christopher Hogan MD;  Location: [facility] CV    CV PCI N/A 7/15/2021    Procedure: PERCUTANEOUS CORONARY INTERVENTION;  Surgeon: Tony Juárez MD;  Location: [facility] CV    IR MISCELLANEOUS PROCEDURE  11/9/2006       CURRENT MEDS:  Prior to Admission medications    Medication Sig Start Date End Date Taking? Authorizing Provider   aspirin 81 MG EC tablet [ASPIRIN 81 MG EC TABLET] Take 81 mg by mouth daily. 2/13/20  Yes Provider, Historical   B Complex Vitamins (B COMPLEX PO) Take 1 capsule by mouth daily   Yes Reported, Patient   Cholecalciferol (VITAMIN D3 PO) Take 1 tablet by mouth daily   Yes Reported, Patient   clopidogrel (PLAVIX) 75 MG tablet Take 75 mg by mouth daily 7/6/23  Yes Reported, Patient   Cyanocobalamin 2000 MCG TBCR Take 1 tablet by mouth daily. 2/20/24  Yes Reported, Patient   latanoprost (XALATAN) 0.005 % ophthalmic solution Place 1 drop into both eyes at bedtime 11/30/23  Yes Reported, Patient   lisinopril (PRINIVIL,ZESTRIL) 2.5 MG tablet [LISINOPRIL (PRINIVIL,ZESTRIL) 2.5 MG TABLET] Take 2.5 mg by mouth daily. 2/13/20  Yes Provider, Historical   metoprolol succinate (TOPROL-XL) 50 MG 24 hr tablet [METOPROLOL SUCCINATE (TOPROL-XL) 50 MG 24 HR TABLET] Take 1 tablet (50 mg total) by mouth daily. 9/23/20  Yes Mela Choi MD   nitroGLYcerin (NITROSTAT) 0.4 MG sublingual tablet [NITROGLYCERIN (NITROSTAT) 0.4 MG SL TABLET] PLACE 1 TABLET UNDER THE TONGUE EVERY 5MIN AS NEEDED FOR CHEST PAIN. IF SYMPTOMS DO NOT RESOLVE IN 5MIN, REPEAT DOSE AND CALL 911 3/12/24  Yes Mela Choi MD   omeprazole (PRILOSEC)  "20 MG capsule Take 20 mg by mouth 2 times daily (before meals)  2/13/20  Yes Provider, Historical   rosuvastatin (CRESTOR) 40 MG tablet Take 1 tablet (40 mg) by mouth daily 1/25/24  Yes Christopher Hogan MD   gabapentin (NEURONTIN) 100 MG capsule Take 100 mg by mouth every 24 hours.  Patient not taking: Reported on 9/20/2024 3/15/24   Reported, Patient         Family History   Problem Relation Age of Onset    Cerebrovascular Disease Mother     Heart Disease Mother     Cancer Father     No Known Problems Sister     Other - See Comments Brother 77.00    No Known Problems Brother     No Known Problems Sister         reports that he quit smoking about 15 years ago. His smoking use included cigarettes. He has never used smokeless tobacco. He reports that he does not currently use drugs. He reports that he does not drink alcohol.    History   Smoking Status    Former    Types: Cigarettes   Smokeless Tobacco    Never       Review of Systems -    The 10 point review of systems is within normal limits except as noted in HPI.    Ht 1.727 m (5' 8\")   Wt 102.5 kg (226 lb)   BMI 34.36 kg/m    226 lbs 0 oz  Body mass index is 34.36 kg/m .    EXAM:  GENERAL: Alert, cooperative, appears stated age  ABDOMEN: Soft, nondistended.  Class II obesity.      LABS:  Lab Results   Component Value Date    HGB 15.6 01/25/2024    WBC 10.1 01/25/2024     01/25/2024    AST 17 01/25/2024    ALT 20 01/25/2024    ALKPHOS 71 01/25/2024    BILITOTAL 0.4 01/25/2024    LIPASE 28 01/24/2024     Imaging CT imaging from January of this year reviewed, demonstrating the large hiatal hernia:.      Assessment/Plan:   1. Hiatal hernia          Bertrand Palacios is a 69 year old male with signs and symptoms consistent with a large hiatal hernia and associated gastroesophageal reflux.  I have explained the pathophysiology of his reflux as well as the underlying hiatal hernia in detail as well as the surgical versus non-operative management strategies.  "     The risks of surgery were discussed in detail which include, but are not limited to, bleeding, infection, injury to surrounding structures, the need to convert to an open procedure, and possible hiatal hernia recurrence over the long-term as well as reflux recurrence.  Additionally, the risks of non operative management were discussed which include, but are not limited to, persistent symptoms and enlargement of the hiatal hernia.  Given his severe symptoms, this will not improve with medication alone and will require an anatomic fix.    With regard to his cardiac status, I see no contraindication to surgery from the standpoint.  We would like to discontinue his Plavix for 5 days prior to surgery, and at least 2 days afterwards before resuming Plavix.  We are perfectly fine with him continuing on aspirin without interruption throughout the course of his perioperative care.    He understands everything which was discussed and has consented to proceed with a minimally invasive hiatal hernia repair with partial posterior fundoplication.  Surgery will be scheduled at a time that works for the patient.      30 minutes spent on the date of the encounter doing chart review, patient visit, and documentation    Emile Mcintyre MD ,MD  [facility] Department of Surgery

## 2024-09-20 NOTE — LETTER
9/20/2024      Bertrand Palacios  [address]  [place] [address]      Dear Colleague,    Thank you for referring your patient, Bertrand Palacios, to the [facility] [place]. Please see a copy of my visit note below.    HPI: Bertrand Palacios is a 69 year old male referred to see me by Mayela Rayo for a large hiatal hernia.  He notes that he has suffered from reflux symptoms for 60 years, recalling his mother treating him with sodium bicarbonate as a child he has had a known hiatal hernia for several years, and has had care established with [facility] within the past year.  With regard to reflux symptoms he notes frequent acid reflux in spite of treatment with  omeprazole..  While this mitigates some of his symptoms, reflux symptoms are still present in spite of medication therapy.  More recently over the course of the past year he has been having increased chest pain and pressure after eating.   As part of his evaluation with [facility] he underwent an upper endoscopy in March of this year, which was noteworthy for a 9 cm hiatal hernia, no evidence of Lala's or reflux esophagitis.    His other relevant medical history is noteworthy for coronary artery disease which has been present for almost 20 years.  He has had a number of stents placed and is currently on both aspirin and Plavix.  He most recently underwent coronary angiogram in January of this year which demonstrated his previously placed stents to be widely patent, with 3 additional drug-eluting stents placed at that point in time.    Allergies:Ticagrelor and Isosorbide    Prior Medical History:   Past Medical History:   Diagnosis Date     Cancer (H)     bladder     Coronary artery disease     S/P PCI in 2006 Per H&P     Hiatal hernia     Per H&P     History of ASCVD     per H&P     Hypertension        Prior Surgical History:   Past Surgical History:   Procedure Laterality Date      BLADDER SURGERY  2008    tumor      CARDIAC CATHETERIZATION      2006 Per H&P     CV CORONARY ANGIOGRAM N/A 3/6/2020    Procedure: Coronary Angiogram;  Surgeon: Tony Juárez MD;  Location: [facility] Cath Lab;  Service: Cardiology     CV CORONARY ANGIOGRAM N/A 1/25/2024    Procedure: Coronary Angiogram;  Surgeon: Christopher Hogan MD;  Location: [facility] CATH LAB CV     CV HEART CATHETERIZATION WITH POSSIBLE INTERVENTION N/A 7/15/2021    Procedure: Coronary Angiogram;  Surgeon: Tony Juárez MD;  Location: [facility] LAB CV     CV LEFT HEART CATH N/A 1/25/2024    Procedure: Left Heart Catheterization;  Surgeon: Christopher Hogan MD;  Location: [facility] LAB CV     CV PCI N/A 7/15/2021    Procedure: PERCUTANEOUS CORONARY INTERVENTION;  Surgeon: Tony Juárez MD;  Location: [facility] LAB CV     IR MISCELLANEOUS PROCEDURE  11/9/2006       CURRENT MEDS:  Prior to Admission medications    Medication Sig Start Date End Date Taking? Authorizing Provider   aspirin 81 MG EC tablet [ASPIRIN 81 MG EC TABLET] Take 81 mg by mouth daily. 2/13/20  Yes Provider, Historical   B Complex Vitamins (B COMPLEX PO) Take 1 capsule by mouth daily   Yes Reported, Patient   Cholecalciferol (VITAMIN D3 PO) Take 1 tablet by mouth daily   Yes Reported, Patient   clopidogrel (PLAVIX) 75 MG tablet Take 75 mg by mouth daily 7/6/23  Yes Reported, Patient   Cyanocobalamin 2000 MCG TBCR Take 1 tablet by mouth daily. 2/20/24  Yes Reported, Patient   latanoprost (XALATAN) 0.005 % ophthalmic solution Place 1 drop into both eyes at bedtime 11/30/23  Yes Reported, Patient   lisinopril (PRINIVIL,ZESTRIL) 2.5 MG tablet [LISINOPRIL (PRINIVIL,ZESTRIL) 2.5 MG TABLET] Take 2.5 mg by mouth daily. 2/13/20  Yes Provider, Historical   metoprolol succinate (TOPROL-XL) 50 MG 24 hr tablet [METOPROLOL SUCCINATE (TOPROL-XL) 50 MG 24 HR TABLET] Take 1 tablet (50 mg total) by mouth daily. 9/23/20  Yes Mela Choi MD   nitroGLYcerin  "(NITROSTAT) 0.4 MG sublingual tablet [NITROGLYCERIN (NITROSTAT) 0.4 MG SL TABLET] PLACE 1 TABLET UNDER THE TONGUE EVERY 5MIN AS NEEDED FOR CHEST PAIN. IF SYMPTOMS DO NOT RESOLVE IN 5MIN, REPEAT DOSE AND CALL 911 3/12/24  Yes Mela Choi MD   omeprazole (PRILOSEC) 20 MG capsule Take 20 mg by mouth 2 times daily (before meals)  2/13/20  Yes Provider, Historical   rosuvastatin (CRESTOR) 40 MG tablet Take 1 tablet (40 mg) by mouth daily 1/25/24  Yes Christopher Hogan MD   gabapentin (NEURONTIN) 100 MG capsule Take 100 mg by mouth every 24 hours.  Patient not taking: Reported on 9/20/2024 3/15/24   Reported, Patient         Family History   Problem Relation Age of Onset     Cerebrovascular Disease Mother      Heart Disease Mother      Cancer Father      No Known Problems Sister      Other - See Comments Brother 77.00     No Known Problems Brother      No Known Problems Sister         reports that he quit smoking about 15 years ago. His smoking use included cigarettes. He has never used smokeless tobacco. He reports that he does not currently use drugs. He reports that he does not drink alcohol.    History   Smoking Status     Former     Types: Cigarettes   Smokeless Tobacco     Never       Review of Systems -    The 10 point review of systems is within normal limits except as noted in HPI.    Ht 1.727 m (5' 8\")   Wt 102.5 kg (226 lb)   BMI 34.36 kg/m    226 lbs 0 oz  Body mass index is 34.36 kg/m .    EXAM:  GENERAL: Alert, cooperative, appears stated age  ABDOMEN: Soft, nondistended.  Class II obesity.      LABS:  Lab Results   Component Value Date    HGB 15.6 01/25/2024    WBC 10.1 01/25/2024     01/25/2024    AST 17 01/25/2024    ALT 20 01/25/2024    ALKPHOS 71 01/25/2024    BILITOTAL 0.4 01/25/2024    LIPASE 28 01/24/2024     Imaging CT imaging from January of this year reviewed, demonstrating the large hiatal hernia:.      Assessment/Plan:   1. Hiatal hernia          Bertrand Palacios is a 69 year " old male with signs and symptoms consistent with a large hiatal hernia and associated gastroesophageal reflux.  I have explained the pathophysiology of his reflux as well as the underlying hiatal hernia in detail as well as the surgical versus non-operative management strategies.      The risks of surgery were discussed in detail which include, but are not limited to, bleeding, infection, injury to surrounding structures, the need to convert to an open procedure, and possible hiatal hernia recurrence over the long-term as well as reflux recurrence.  Additionally, the risks of non operative management were discussed which include, but are not limited to, persistent symptoms and enlargement of the hiatal hernia.  Given his severe symptoms, this will not improve with medication alone and will require an anatomic fix.    With regard to his cardiac status, I see no contraindication to surgery from the standpoint.  We would like to discontinue his Plavix for 5 days prior to surgery, and at least 2 days afterwards before resuming Plavix.  We are perfectly fine with him continuing on aspirin without interruption throughout the course of his perioperative care.    He understands everything which was discussed and has consented to proceed with a minimally invasive hiatal hernia repair with partial posterior fundoplication.  Surgery will be scheduled at a time that works for the patient.      30 minutes spent on the date of the encounter doing chart review, patient visit, and documentation    Emile Mcintyre MD ,MD  [facility] Department of Surgery      Again, thank you for allowing me to participate in the care of your patient.        Sincerely,        Emile Mcintyre MD

## 2024-09-20 NOTE — LETTER
Pre-op Physical: Schedule a pre-op physical with your primary care doctor. The pre-op physical must be 10-30 days before surgery and since it is required by anesthesia, your surgery will be cancelled if it's not done.    Surgery Date: 10/18/2024     Location: [place] [address]    Approximate Arrival Time: 1:00 pm  (Unless instructed differently by the pre-op call nurse)     Post op Appointment: 2 weeks post hospital discharge.    Pre-Surgical Tasks:     Review all medications with your primary care or prescribing physician; they will advise you which meds to stop and when, and when you can resume taking.  Certain medications like blood thinners and weight loss medications need to be stopped in advance of surgery to proceed safely.      Blood thinners including but not exclusive to drugs like Xarelto, Eliquis, Warfarin and Aspirin, should be stopped five days before surgery, if your prescribing provider agrees. Follow your provider's advice on stopping blood thinners because they know you best.  If you are unsure if your medication is a blood thinner, ask your prescribing provider.    Weight loss medications: There are multiple medications being used for weight management and diabetes today, and the list is growing.  Phentermine, Ozempic, Wegovy, Trulicity, and other similar medications need to be stopped one week before surgery to avoid being cancelled.  Victoza and Saxenda can be continued longer but must be stopped one full day before surgery.  Please ask your prescribing provider for advice.    Diabetic medications: in addition to the medications talked about above that are used for either weight loss or diabetes, some people are on insulin that may require adjustment.  Please discuss managing diabetic medications with your prescribing doctor as these medications may require modification prior to surgery.     Please shower the evening before and morning  of surgery with Hibiclens soap.  Any [facility] can provide this to you at no cost, or it can be found at your local pharmacy.     Fasting instructions will be provided by the pre-op nurse who will call you 1-3 days before surgery.  Typically, we advise normal food up to 8 hours before you arrive for surgery. Clear liquids only from then until 2 hours before you arrive surgery, then nothing at all by mouth.  The nurse will review your specific instructions with you at the call.      Smoking impacts your body's ability to heal properly so we advise patients to quit if possible before surgery.  Plastic Surgery patients are required to be nicotine free for at least 8 weeks before surgery.      You will need an adult to drive you home and stay with you 24 hours after surgery. Public transportation or Medical Van Services are not permitted.    Visitor restrictions are subject to change, please verify with the pre-op nurse when they call how many people are permitted to accompany you.    We always encourage you to notify your insurance any time you have medical tests or procedures scheduled including surgery. The number is usually right on the back of your insurance card. To obtain pricing for surgery, please call [facility] Cost of Care at [phone] or email [email or web address].        Call our office if you have any questions! Thank you!     Mayo Cohen  Complex   [facility]  [phone]

## 2024-09-24 NOTE — H&P (VIEW-ONLY)
[facility]      Preoperative Consultation   Bertrand Palacios   : 1955   Gender: male    Date of Encounter: 2024    Nursing Notes:   Holly Harper MA  2024  8:49 AM  Signed  Chief Complaint   Patient presents with     Lump     Left side of neck x1 month - not painful, swollen up into ear      Preoperative Exam     10/18/24 - [facility]      Bertrand Palacios is a 69 y.o. male (1955) who presents for preop evaluation undergoing HIATAL HERNIA REPAIR WITH FUNDOPLICATION, PARTIAL POSTERIOR, ROBOT-ASSISTED, LAPAROSCOPIC, USING DA SARITHA XI     Date of Surgery: 10/18/2024  Surgical Specialty:  [facility]/Surgical Facility: [facility]   Fax number:   Surgery type: inpatient   Primary Physician: Mayela Rayo CMA ....................  2024   8:44 AM       History of Present Illness   Patient is 69 years old very pleasant white gentleman with a significant medical history of hypertension, hyperlipidemia, coronary artery disease, GERD/hiatal hernia.  He is scheduled for hiatal hernia repair surgery because of ongoing reflux symptoms.  Patient otherwise in his usual state of health and doing well.  He denies any cold or flulike symptoms.  No fever chills or night sweats.  No cough or wheezing or palpitations or shortness of breath or chest pain.  No headache dizziness or vertigo.  No trouble swallowing.  No bowel or bladder symptoms.  He has noticed a soft lump on left side of neck a few weeks ago which keep enlarging and he is worried about it.       Review of Systems   A comprehensive review of systems was negative except for items noted in HPI.    Patient Active Problem List   Diagnosis Code     Atypical nevus D22.9     Coronary artery disease of native artery of native heart with stable angina pectoris (HC) I25.118     Dyslipidemia E78.5     Dyslipidemia, goal LDL below 70 E78.5     Dyspnea on exertion R06.09     Benign essential hypertension I10     Status post coronary  angiogram Z98.890     Cholecystitis K81.9     Chronic bronchitis, unspecified chronic bronchitis type (HC) J42     Gastroesophageal reflux disease without esophagitis K21.9     Hiatal hernia K44.9     Bladder cancer (HC) C67.9     Current Outpatient Medications   Medication Sig     acetaminophen (TYLENOL) 325 mg tablet Take 2 Tablets (650 mg) by mouth every 4 hours if needed. Max acetaminophen dose: 4000mg in 24 hrs.     ASPIRIN 81 MG CHEWABLE TAB takes 1 daily     cholecalciferol (VITAMIN D3) 1,000 unit tablet Take 1 tablet by mouth once daily.     clopidogreL (PLAVIX) 75 mg tablet Take 1 Tablet (75 mg) by mouth once daily.     gabapentin (NEURONTIN) 100 mg capsule Take 1 Capsule (100 mg) by mouth 3 times daily if needed (nerve pain).     lisinopriL (PRINIVIL; ZESTRIL) 2.5 mg tablet Take 1 Tablet (2.5 mg) by mouth once daily.     metoprolol succinate (TOPROL XL) 50 mg sustained-release tablet Take 1 Tablet (50 mg) by mouth once daily.     multivitamin capsule Take 1 capsule by mouth once daily.     omeprazole (PRILOSEC) 20 mg Delayed-Release capsule TAKE 1 CAPSULE(20 MG) BY MOUTH TWICE DAILY BEFORE MEALS     rosuvastatin (CRESTOR) 40 mg tablet Take 1 Tablet (40 mg) by mouth at bedtime.     No current facility-administered medications for this visit.     Medications have been reviewed by me and are current to the best of my knowledge and ability.     Allergies   Allergen Reactions     Ticagrelor Shortness Of Breath and Dyspnea     Isosorbide Headache     Past Surgical History:   . Laterality Date     CORONARY STENT PLACEMENT      x7     GALLBLADDER SURGERY  03/2022     LAPAROSCOPIC CHOLECYSTECTOMY  03/17/2022    Dr Malave     TRANSURETHRAL RESECTION OF BLADDER TUMOR       Social History     Tobacco Use     Smoking status: Former     Current packs/day: 0.00     Average packs/day: 2.0 packs/day for 45.0 years (90.1 ttl pk-yrs)     Types: Cigarettes, Pipe     Start date: 10/29/1963     Quit date: 11/8/2008     Years  "since quitting: 15.8     Smokeless tobacco: Never     Tobacco comments:     marijuana as well; and a pipe for a while   Vaping Use     Vaping status: Never Used   Substance Use Topics     Alcohol use: No     Drug use: No     Family History   Problem Relation Age of Onset     Cancer-colon Father          PAST DIFFICULTY WITH ANESTHESIA: None     Physical Exam   /70 (Cuff Site: Right Arm, Position: Sitting, Cuff Size: Adult Large)   Pulse 78   Temp 98.1  F (36.7  C)   Resp 20   Ht 1.715 m (5' 7.5\")   Wt 102.1 kg (225 lb)   SpO2 94%   BMI 34.72 kg/m   Body mass index is 34.72 kg/m .    Gen: patient is awake and alert and looks comfortable.  HEENT: no conjunctiva pallor, no scleral icterus, pupils are equal round and reactive to light. Oral cavity clean, tongue moist.  Neck: supple, trachea central, no thyromegaly, no lymphadenopathy.  There is a soft lump on left side of the neck skin which is not tender.  Chest: air entry is normal bilaterally, no wheezes and crackles.  Heart: S1 and S2 are audible and regular.   Abdomen: It is moderately obese but soft , no guarding or rigidity, no masses or hepatosplenomegaly and bowel sounds are present.  Extremities : pulses are present, no ankle/leg edema.  Skin: no rash.  Multiple moles and freckles are present.  CNS/Psych: patient is awake, alert and oriented x 3. Cooperative and polite. Speech and memory are intact. Mood and affect are normal. Gait is normal. No focal deficit.       Assessment / Plan     Labs: pending  ECG: Normal sinus rhythm.  Rate 80.  No acute ST and T wave changes.    ICD-10-CM    1. Preop general physical exam  Z01.818 CBC AND DIFFERENTIAL     BASIC METABOLIC PANEL     PROTIME-INR     SC READING EKG - NO CHARGE, COMP ONLY     EKG 12 LEAD     SC ECG ROUTINE ECG W/LEAST 12 LDS W/I&R      2. Hiatal hernia  K44.9 CBC AND DIFFERENTIAL     BASIC METABOLIC PANEL     PROTIME-INR     SC READING EKG - NO CHARGE, COMP ONLY     EKG 12 LEAD     SC ECG " ROUTINE ECG W/LEAST 12 LDS W/I&R      3. Lump in neck  R22.1 AMB CONSULT TO AUDIOLOGY AND ENT          Patient is cleared for planned procedure.      Please stop taking  Aspirin, Cilostazol, Advil/Ibuprofen, Aleeve/Naproxen, indomethacin or any other NSAIDS or Gentile 2 Inhibitors( Diclofenac, Meloxicam, Celecoxib, etc.) 7 days prior to procedure/surgery.    Stop all the vitamin products and Herbal supplements/drugs 7 days and Fish oil capsule 14 prior to procedure/surgery.    Stop taking any muscle relaxants, Baclofen, Flexeril or Zanaflex, 7 days prior to procedure/surgery.    Ok to takeTylenol ( acetaminophen) as needed for pain and fever.      You should take  your all other routine medications with sip of water on the morning of procedure/surgery.        Electronically Signed by:   rBian Marie MD ....................  9/24/2024   8:59 AM    9/24/2024

## 2024-09-30 ENCOUNTER — OFFICE VISIT (OUTPATIENT)
Dept: CARDIOLOGY | Facility: CLINIC | Age: 69
End: 2024-09-30
Payer: COMMERCIAL

## 2024-09-30 VITALS
RESPIRATION RATE: 16 BRPM | HEART RATE: 88 BPM | WEIGHT: 226 LBS | DIASTOLIC BLOOD PRESSURE: 68 MMHG | SYSTOLIC BLOOD PRESSURE: 118 MMHG | OXYGEN SATURATION: 95 % | BODY MASS INDEX: 34.36 KG/M2

## 2024-09-30 DIAGNOSIS — I25.10 CORONARY ARTERY DISEASE INVOLVING NATIVE CORONARY ARTERY OF NATIVE HEART WITHOUT ANGINA PECTORIS: Primary | ICD-10-CM

## 2024-09-30 PROCEDURE — 99214 OFFICE O/P EST MOD 30 MIN: CPT | Performed by: INTERNAL MEDICINE

## 2024-09-30 NOTE — PROGRESS NOTES
HEART CARE ENCOUNTER NOTE      [facility]  [phone]      Assessment/Recommendations   Assessment:   1.  Known coronary disease with anatomical assessment by invasive coronary angiography January 2024 showing widely patent stents in LAD and RCA and no new obstructive lesions.  No anginal symptoms currently and no apparent limitation of exercise tolerance.  Patient is a low risk for cardiac complications associated with his planned hernia surgery.  Since he is still on dual antiplatelet therapy I would recommend discontinuing the Plavix for 7 days prior to the surgery and the Plavix can be resumed after surgery as long as there are no immediate bleeding complications.      Plan:   1.  Proceed with surgery as scheduled.  2.  Stop taking Plavix beginning 7 days prior to the surgery.  May resume the day after surgery.  3.  Continue all other routine medications including aspirin up to the day of surgery.           History of Present Illness/Subjective    HPI: Mr. Bertrand Palacios is a 69 y.o. male with a PMHx significant for CAD s/p PCI (most recent PCI to the LAD on 3/6/20) and multiple stents (on ASA, ACE-I, statin) who presents to Heart Care clinic for preoperative risk assessment prior to planned surgery to reduce a hiatal hernia.  He is new to me today.  Usually he is followed by my colleague Dr. Choi.  He has known coronary disease.  Last January he underwent coronary angiography which disclosed widely patent stents in his LAD and right coronary artery with no new stenosis since his prior angio in 2020.  He continues to do well.  He is having no anginal symptoms but does have dyspepsia/reflux type symptoms.  He remains on dual antiplatelet therapy due to the presence of multiple stents.  He is very active physically.  He is planning to go fishing next week in [place].  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    Studies  reviewed:  ECG:  Echo: 2/10/2022:  1. Normal left ventricular size and systolic performance with a visually  estimated ejection fraction of 50-55%.  2. There is mild distal lateral and distal anterior hypokinesis.  3. No significant valvular heart disease is identified on this study.  4. Normal right ventricular size and systolic performance.  Coronary angios 1/25/2024:  LM:no obstruction  LAD:patent pLAD/mLAD and mid-distal LAD stent w/ minimal ISR  Lcx:mildly irregular  RCA:dominant, patent proximal and rPDA stents     LVEDP:17            Physical Examination  Review of Systems   There were no vitals taken for this visit.  There is no height or weight on file to calculate BMI.  Wt Readings from Last 3 Encounters:   09/20/24 102.5 kg (226 lb)   02/16/24 100.7 kg (222 lb)   01/24/24 97.5 kg (215 lb)       General Appearance:   Pleasant gentleman appears stated age. no acute distress, normal body habitus   ENT/Mouth: Oropharynx normal    EYES:  no scleral icterus, normal conjunctivae. No eyelid xanthelasma   Neck: No cervical lymphadenopathy  Thyroid not enlarged or nodular  No JVD   Respiratory:   lungs clear , no rales or wheezing, Normal chest wall expansion    Cardiovascular:   Regular rhythm, normal rate. Normal 1st and 2nd heart sounds.  No significant systolic murmurs, no rubs or gallops;   radial pulses are full and equal   Jugular venous pressure is not elevated   Abdomen/GI:  No tenderness, no organomegaly, no pulsatile masses. NBS.   Extremities: No cyanosis or clubbing.  No peripheral edema   Skin: No rash or lesions   Heme/lymph/ Immunology No lymphadenopathy, petechiae   Neurologic: Alert and oriented. No focal motor weakness, gait appears normal.       Psychiatric: Pleasant, calm, appropriate affect.          No known hepatic, renal, pulmonary, or CNS disorders. The remainder of the complete ROS is negative except as noted above.         Medical History  Surgical History Family History Social History    Past Medical History:   Diagnosis Date    Cancer (H)     bladder    Coronary artery disease     S/P PCI in 2006 Per H&P    Hiatal hernia     Per H&P    History of ASCVD     per H&P    Hypertension      Past Surgical History:   Procedure Laterality Date    BLADDER SURGERY  2008    tumor     CARDIAC CATHETERIZATION      2006 Per H&P    CV CORONARY ANGIOGRAM N/A 3/6/2020    Procedure: Coronary Angiogram;  Surgeon: Tony Juárez MD;  Location: [facility] Cath Lab;  Service: Cardiology    CV CORONARY ANGIOGRAM N/A 1/25/2024    Procedure: Coronary Angiogram;  Surgeon: Christopher Hogan MD;  Location: [facility] CATH LAB CV    CV HEART CATHETERIZATION WITH POSSIBLE INTERVENTION N/A 7/15/2021    Procedure: Coronary Angiogram;  Surgeon: Tony Juárez MD;  Location: [facility] CATH LAB CV    CV LEFT HEART CATH N/A 1/25/2024    Procedure: Left Heart Catheterization;  Surgeon: Christopher Hogan MD;  Location: [facility] LAB CV    CV PCI N/A 7/15/2021    Procedure: PERCUTANEOUS CORONARY INTERVENTION;  Surgeon: Tony Juárez MD;  Location: [facility] CATH LAB CV    IR MISCELLANEOUS PROCEDURE  11/9/2006     Family History   Problem Relation Age of Onset    Cerebrovascular Disease Mother     Heart Disease Mother     Cancer Father     No Known Problems Sister     Other - See Comments Brother 77.00    No Known Problems Brother     No Known Problems Sister         Social History     Socioeconomic History    Marital status:      Spouse name: Not on file    Number of children: Not on file    Years of education: Not on file    Highest education level: Not on file   Occupational History    Not on file   Tobacco Use    Smoking status: Former     Current packs/day: 0.00     Types: Cigarettes     Quit date: 11/17/2008     Years since quitting: 15.8    Smokeless tobacco: Never   Substance and Sexual Activity    Alcohol use: Never    Drug use: Not Currently    Sexual activity: Not Currently   Other Topics Concern    Not  on file   Social History Narrative    Not on file     Social Determinants of Health     Financial Resource Strain: Low Risk  (9/24/2024)    Received from [facility] [facility]    Financial Resource Strain     Difficulty of Paying Living Expenses: 3     Difficulty of Paying Living Expenses: Not on file   Food Insecurity: No Food Insecurity (9/24/2024)    Received from [employer] [facility] [employer] [facility]    Food Insecurity     Worried About Running Out of Food in the Last Year: 1   Transportation Needs: No Transportation Needs (9/24/2024)    Received from [facility]    Transportation Needs     Lack of Transportation (Medical): 1   Physical Activity: Not on file   Stress: Not on file   Social Connections: Socially Integrated (9/24/2024)    Received from [facility]    Social Connections     Frequency of Communication with Friends and Family: 0   Interpersonal Safety: Not on file   Housing Stability: Low Risk  (9/24/2024)    Received from [employer] [facility] [employer] [facility]    Housing Stability     Unable to Pay for Housing in the Last Year: 1           Medications  Allergies   Current Outpatient Medications   Medication Sig Dispense Refill    aspirin 81 MG EC tablet [ASPIRIN 81 MG EC TABLET] Take 81 mg by mouth daily.      B Complex Vitamins (B COMPLEX PO) Take 1 capsule by mouth daily      Cholecalciferol (VITAMIN D3 PO) Take 1 tablet by mouth daily      clopidogrel (PLAVIX) 75 MG tablet Take 75 mg by mouth daily      Cyanocobalamin 2000 MCG TBCR Take 1 tablet by mouth daily.      gabapentin (NEURONTIN) 100 MG capsule Take 100 mg by mouth every 24 hours. (Patient not taking: Reported on 9/20/2024)      latanoprost (XALATAN) 0.005 % ophthalmic solution Place 1 drop into both eyes at bedtime      lisinopril (PRINIVIL,ZESTRIL) 2.5 MG tablet [LISINOPRIL (PRINIVIL,ZESTRIL) 2.5 MG TABLET] Take 2.5 mg by mouth daily.    "   metoprolol succinate (TOPROL-XL) 50 MG 24 hr tablet [METOPROLOL SUCCINATE (TOPROL-XL) 50 MG 24 HR TABLET] Take 1 tablet (50 mg total) by mouth daily. 90 tablet 1    nitroGLYcerin (NITROSTAT) 0.4 MG sublingual tablet [NITROGLYCERIN (NITROSTAT) 0.4 MG SL TABLET] PLACE 1 TABLET UNDER THE TONGUE EVERY 5MIN AS NEEDED FOR CHEST PAIN. IF SYMPTOMS DO NOT RESOLVE IN 5MIN, REPEAT DOSE AND CALL 911 90 tablet 1    omeprazole (PRILOSEC) 20 MG capsule Take 20 mg by mouth 2 times daily (before meals)       rosuvastatin (CRESTOR) 40 MG tablet Take 1 tablet (40 mg) by mouth daily 90 tablet 3       Allergies   Allergen Reactions    Ticagrelor Shortness Of Breath    Isosorbide Headache          Lab Results    Chemistry/lipid CBC Cardiac Enzymes/BNP/TSH/INR   Recent Labs   Lab Test 07/15/21  0717   CHOL 126   HDL 42   LDL 57   TRIG 136     Recent Labs   Lab Test 07/15/21  0717 03/07/20  0708 03/06/20  1005   LDL 57 83 89     Recent Labs   Lab Test 01/25/24  0445      POTASSIUM 4.1   CHLORIDE 109*   CO2 24   *   BUN 18.9   CR 0.97   GFRESTIMATED 85   LESLIE 8.6*     Recent Labs   Lab Test 01/25/24 0445 01/24/24 2036 07/17/21  0121   CR 0.97 0.89 0.82     No results for input(s): \"A1C\" in the last 81771 hours.       Recent Labs   Lab Test 01/25/24  0445   WBC 10.1   HGB 15.6   HCT 46.7   MCV 88        Recent Labs   Lab Test 01/25/24  0445 01/24/24 2036 07/17/21  0121   HGB 15.6 16.3 15.7    Recent Labs   Lab Test 07/17/21  0121 07/16/21  0554 02/29/20  0651   TROPONINI 0.08 0.05 0.02     Recent Labs   Lab Test 01/24/24 2036   NTBNPI <36     No results for input(s): \"TSH\" in the last 16431 hours.  No results for input(s): \"INR\" in the last 10250 hours.     Anthony Valentine MD                                    "

## 2024-09-30 NOTE — LETTER
9/30/2024    Mayela Rayo MD  [address]  [place] [address]    RE: Bertrand Palacios       Dear Colleague,     I had the pleasure of seeing Bertrand Palacios in the [facility].    HEART CARE ENCOUNTER NOTE      [facility]  [phone]      Assessment/Recommendations   Assessment:   1.  Known coronary disease with anatomical assessment by invasive coronary angiography January 2024 showing widely patent stents in LAD and RCA and no new obstructive lesions.  No anginal symptoms currently and no apparent limitation of exercise tolerance.  Patient is a low risk for cardiac complications associated with his planned hernia surgery.  Since he is still on dual antiplatelet therapy I would recommend discontinuing the Plavix for 7 days prior to the surgery and the Plavix can be resumed after surgery as long as there are no immediate bleeding complications.      Plan:   1.  Proceed with surgery as scheduled.  2.  Stop taking Plavix beginning 7 days prior to the surgery.  May resume the day after surgery.  3.  Continue all other routine medications including aspirin up to the day of surgery.           History of Present Illness/Subjective    HPI: Mr. Bertrand Palacios is a 69 y.o. male with a PMHx significant for CAD s/p PCI (most recent PCI to the LAD on 3/6/20) and multiple stents (on ASA, ACE-I, statin) who presents to Heart Care clinic for preoperative risk assessment prior to planned surgery to reduce a hiatal hernia.  He is new to me today.  Usually he is followed by my colleague Dr. Choi.  He has known coronary disease.  Last January he underwent coronary angiography which disclosed widely patent stents in his LAD and right coronary artery with no new stenosis since his prior angio in 2020.  He continues to do well.  He is having no anginal symptoms but does have dyspepsia/reflux type symptoms.  He remains on dual antiplatelet therapy due to the presence of multiple  stents.  He is very active physically.  He is planning to go fishing next week in [place].  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    Studies reviewed:  ECG:  Echo: 2/10/2022:  1. Normal left ventricular size and systolic performance with a visually  estimated ejection fraction of 50-55%.  2. There is mild distal lateral and distal anterior hypokinesis.  3. No significant valvular heart disease is identified on this study.  4. Normal right ventricular size and systolic performance.  Coronary angios 1/25/2024:  LM:no obstruction  LAD:patent pLAD/mLAD and mid-distal LAD stent w/ minimal ISR  Lcx:mildly irregular  RCA:dominant, patent proximal and rPDA stents     LVEDP:17            Physical Examination  Review of Systems   There were no vitals taken for this visit.  There is no height or weight on file to calculate BMI.  Wt Readings from Last 3 Encounters:   09/20/24 102.5 kg (226 lb)   02/16/24 100.7 kg (222 lb)   01/24/24 97.5 kg (215 lb)       General Appearance:   Pleasant gentleman appears stated age. no acute distress, normal body habitus   ENT/Mouth: Oropharynx normal    EYES:  no scleral icterus, normal conjunctivae. No eyelid xanthelasma   Neck: No cervical lymphadenopathy  Thyroid not enlarged or nodular  No JVD   Respiratory:   lungs clear , no rales or wheezing, Normal chest wall expansion    Cardiovascular:   Regular rhythm, normal rate. Normal 1st and 2nd heart sounds.  No significant systolic murmurs, no rubs or gallops;   radial pulses are full and equal   Jugular venous pressure is not elevated   Abdomen/GI:  No tenderness, no organomegaly, no pulsatile masses. NBS.   Extremities: No cyanosis or clubbing.  No peripheral edema   Skin: No rash or lesions   Heme/lymph/ Immunology No lymphadenopathy, petechiae   Neurologic: Alert and oriented. No focal motor weakness, gait appears normal.       Psychiatric: Pleasant, calm, appropriate affect.          No  known hepatic, renal, pulmonary, or CNS disorders. The remainder of the complete ROS is negative except as noted above.         Medical History  Surgical History Family History Social History   Past Medical History:   Diagnosis Date     Cancer (H)     bladder     Coronary artery disease     S/P PCI in 2006 Per H&P     Hiatal hernia     Per H&P     History of ASCVD     per H&P     Hypertension      Past Surgical History:   Procedure Laterality Date     BLADDER SURGERY  2008    tumor      CARDIAC CATHETERIZATION      2006 Per H&P     CV CORONARY ANGIOGRAM N/A 3/6/2020    Procedure: Coronary Angiogram;  Surgeon: Tony Juárez MD;  Location: [facility] Cath Lab;  Service: Cardiology     CV CORONARY ANGIOGRAM N/A 1/25/2024    Procedure: Coronary Angiogram;  Surgeon: Christopher Hogan MD;  Location: [facility] CATH LAB CV     CV HEART CATHETERIZATION WITH POSSIBLE INTERVENTION N/A 7/15/2021    Procedure: Coronary Angiogram;  Surgeon: Tony Juárez MD;  Location: [facility] CATH LAB CV     CV LEFT HEART CATH N/A 1/25/2024    Procedure: Left Heart Catheterization;  Surgeon: Christopher Hogan MD;  Location: [facility] CV     CV PCI N/A 7/15/2021    Procedure: PERCUTANEOUS CORONARY INTERVENTION;  Surgeon: Tony Juárez MD;  Location: [facility] LAB CV     IR MISCELLANEOUS PROCEDURE  11/9/2006     Family History   Problem Relation Age of Onset     Cerebrovascular Disease Mother      Heart Disease Mother      Cancer Father      No Known Problems Sister      Other - See Comments Brother 77.00     No Known Problems Brother      No Known Problems Sister         Social History     Socioeconomic History     Marital status:      Spouse name: Not on file     Number of children: Not on file     Years of education: Not on file     Highest education level: Not on file   Occupational History     Not on file   Tobacco Use     Smoking status: Former     Current packs/day: 0.00     Types: Cigarettes     Quit  date: 11/17/2008     Years since quitting: 15.8     Smokeless tobacco: Never   Substance and Sexual Activity     Alcohol use: Never     Drug use: Not Currently     Sexual activity: Not Currently   Other Topics Concern     Not on file   Social History Narrative     Not on file     Social Determinants of Health     Financial Resource Strain: Low Risk  (9/24/2024)    [facility]    Financial Resource Strain      Difficulty of Paying Living Expenses: 3      Difficulty of Paying Living Expenses: Not on file   Food Insecurity: No Food Insecurity (9/24/2024)    Received from [facility]    Food Insecurity      Worried About Running Out of Food in the Last Year: 1   Transportation Needs: No Transportation Needs (9/24/2024)    Received from [facility]    Transportation Needs      Lack of Transportation (Medical): 1   Physical Activity: Not on file   Stress: Not on file   Social Connections: Socially Integrated (9/24/2024)    Received from [facility]    Social Connections      Frequency of Communication with Friends and Family: 0   Interpersonal Safety: Not on file   Housing Stability: Low Risk  (9/24/2024)    Received from [facility]    Housing Stability      Unable to Pay for Housing in the Last Year: 1           Medications  Allergies   Current Outpatient Medications   Medication Sig Dispense Refill     aspirin 81 MG EC tablet [ASPIRIN 81 MG EC TABLET] Take 81 mg by mouth daily.       B Complex Vitamins (B COMPLEX PO) Take 1 capsule by mouth daily       Cholecalciferol (VITAMIN D3 PO) Take 1 tablet by mouth daily       clopidogrel (PLAVIX) 75 MG tablet Take 75 mg by mouth daily       Cyanocobalamin 2000 MCG TBCR Take 1 tablet by mouth daily.       gabapentin (NEURONTIN) 100 MG capsule Take 100 mg by mouth every 24 hours. (Patient not taking:  "Reported on 9/20/2024)       latanoprost (XALATAN) 0.005 % ophthalmic solution Place 1 drop into both eyes at bedtime       lisinopril (PRINIVIL,ZESTRIL) 2.5 MG tablet [LISINOPRIL (PRINIVIL,ZESTRIL) 2.5 MG TABLET] Take 2.5 mg by mouth daily.       metoprolol succinate (TOPROL-XL) 50 MG 24 hr tablet [METOPROLOL SUCCINATE (TOPROL-XL) 50 MG 24 HR TABLET] Take 1 tablet (50 mg total) by mouth daily. 90 tablet 1     nitroGLYcerin (NITROSTAT) 0.4 MG sublingual tablet [NITROGLYCERIN (NITROSTAT) 0.4 MG SL TABLET] PLACE 1 TABLET UNDER THE TONGUE EVERY 5MIN AS NEEDED FOR CHEST PAIN. IF SYMPTOMS DO NOT RESOLVE IN 5MIN, REPEAT DOSE AND CALL 911 90 tablet 1     omeprazole (PRILOSEC) 20 MG capsule Take 20 mg by mouth 2 times daily (before meals)        rosuvastatin (CRESTOR) 40 MG tablet Take 1 tablet (40 mg) by mouth daily 90 tablet 3       Allergies   Allergen Reactions     Ticagrelor Shortness Of Breath     Isosorbide Headache          Lab Results    Chemistry/lipid CBC Cardiac Enzymes/BNP/TSH/INR   Recent Labs   Lab Test 07/15/21  0717   CHOL 126   HDL 42   LDL 57   TRIG 136     Recent Labs   Lab Test 07/15/21  0717 03/07/20  0708 03/06/20  1005   LDL 57 83 89     Recent Labs   Lab Test 01/25/24  0445      POTASSIUM 4.1   CHLORIDE 109*   CO2 24   *   BUN 18.9   CR 0.97   GFRESTIMATED 85   LESLIE 8.6*     Recent Labs   Lab Test 01/25/24  0445 01/24/24 2036 07/17/21  0121   CR 0.97 0.89 0.82     No results for input(s): \"A1C\" in the last 12274 hours.       Recent Labs   Lab Test 01/25/24  0445   WBC 10.1   HGB 15.6   HCT 46.7   MCV 88        Recent Labs   Lab Test 01/25/24  0445 01/24/24 2036 07/17/21  0121   HGB 15.6 16.3 15.7    Recent Labs   Lab Test 07/17/21  0121 07/16/21  0554 02/29/20  0651   TROPONINI 0.08 0.05 0.02     Recent Labs   Lab Test 01/24/24  2036   NTBNPI <36     No results for input(s): \"TSH\" in the last 70788 hours.  No results for input(s): \"INR\" in the last 50718 hours.     Anthony MENDEZ" MD Serge                                        Thank you for allowing me to participate in the care of your patient.      Sincerely,     Anthony Valentine MD     [facility]  cc:   Anthony Valentine MD  [address]  [place] [address]

## 2024-09-30 NOTE — PATIENT INSTRUCTIONS
Stop taking the Plavix 7 days before the surgery. Resume 1 day after surgery if no bleeding complications.   Continue all other meds as ordered.   Follow up 1 year

## 2024-10-17 RX ORDER — MULTIVITAMIN
1 TABLET ORAL DAILY
COMMUNITY

## 2024-10-18 ENCOUNTER — HOSPITAL ENCOUNTER (INPATIENT)
Facility: HOSPITAL | Age: 69
LOS: 1 days | Discharge: HOME | DRG: 328 | End: 2024-10-19
Attending: SURGERY | Admitting: SURGERY
Payer: COMMERCIAL

## 2024-10-18 ENCOUNTER — ANESTHESIA EVENT (OUTPATIENT)
Dept: SURGERY | Facility: HOSPITAL | Age: 69
DRG: 328 | End: 2024-10-18
Payer: COMMERCIAL

## 2024-10-18 ENCOUNTER — ANESTHESIA (OUTPATIENT)
Dept: SURGERY | Facility: HOSPITAL | Age: 69
DRG: 328 | End: 2024-10-18
Payer: COMMERCIAL

## 2024-10-18 DIAGNOSIS — K44.9 HIATAL HERNIA WITH GERD: Primary | ICD-10-CM

## 2024-10-18 DIAGNOSIS — K21.9 HIATAL HERNIA WITH GERD: Primary | ICD-10-CM

## 2024-10-18 LAB
CREAT SERPL-MCNC: 1.07 MG/DL (ref 0.67–1.17)
EGFRCR SERPLBLD CKD-EPI 2021: 75 ML/MIN/1.73M2
GLUCOSE BLDC GLUCOMTR-MCNC: 174 MG/DL (ref 70–99)
HOLD SPECIMEN: NORMAL

## 2024-10-18 PROCEDURE — 8E0W4CZ ROBOTIC ASSISTED PROCEDURE OF TRUNK REGION, PERCUTANEOUS ENDOSCOPIC APPROACH: ICD-10-PCS | Performed by: SURGERY

## 2024-10-18 PROCEDURE — 93005 ELECTROCARDIOGRAM TRACING: CPT

## 2024-10-18 PROCEDURE — 258N000003 HC RX IP 258 OP 636: Performed by: ANESTHESIOLOGY

## 2024-10-18 PROCEDURE — 93010 ELECTROCARDIOGRAM REPORT: CPT | Performed by: STUDENT IN AN ORGANIZED HEALTH CARE EDUCATION/TRAINING PROGRAM

## 2024-10-18 PROCEDURE — 999N000141 HC STATISTIC PRE-PROCEDURE NURSING ASSESSMENT: Performed by: SURGERY

## 2024-10-18 PROCEDURE — S2900 ROBOTIC SURGICAL SYSTEM: HCPCS | Performed by: SURGERY

## 2024-10-18 PROCEDURE — 258N000003 HC RX IP 258 OP 636: Performed by: SURGERY

## 2024-10-18 PROCEDURE — C1781 MESH (IMPLANTABLE): HCPCS | Performed by: SURGERY

## 2024-10-18 PROCEDURE — 250N000011 HC RX IP 250 OP 636: Performed by: ANESTHESIOLOGY

## 2024-10-18 PROCEDURE — 250N000011 HC RX IP 250 OP 636: Performed by: STUDENT IN AN ORGANIZED HEALTH CARE EDUCATION/TRAINING PROGRAM

## 2024-10-18 PROCEDURE — 258N000003 HC RX IP 258 OP 636: Performed by: NURSE ANESTHETIST, CERTIFIED REGISTERED

## 2024-10-18 PROCEDURE — 360N000080 HC SURGERY LEVEL 7, PER MIN: Performed by: SURGERY

## 2024-10-18 PROCEDURE — 250N000009 HC RX 250: Performed by: NURSE ANESTHETIST, CERTIFIED REGISTERED

## 2024-10-18 PROCEDURE — 250N000025 HC SEVOFLURANE, PER MIN: Performed by: SURGERY

## 2024-10-18 PROCEDURE — 93005 ELECTROCARDIOGRAM TRACING: CPT | Performed by: ANESTHESIOLOGY

## 2024-10-18 PROCEDURE — 370N000017 HC ANESTHESIA TECHNICAL FEE, PER MIN: Performed by: SURGERY

## 2024-10-18 PROCEDURE — 250N000011 HC RX IP 250 OP 636: Performed by: SURGERY

## 2024-10-18 PROCEDURE — 710N000009 HC RECOVERY PHASE 1, LEVEL 1, PER MIN: Performed by: SURGERY

## 2024-10-18 PROCEDURE — 43282 LAP PARAESOPH HER RPR W/MESH: CPT | Performed by: NURSE ANESTHETIST, CERTIFIED REGISTERED

## 2024-10-18 PROCEDURE — 120N000001 HC R&B MED SURG/OB

## 2024-10-18 PROCEDURE — 272N000001 HC OR GENERAL SUPPLY STERILE: Performed by: SURGERY

## 2024-10-18 PROCEDURE — 0BQT4ZZ REPAIR DIAPHRAGM, PERCUTANEOUS ENDOSCOPIC APPROACH: ICD-10-PCS | Performed by: SURGERY

## 2024-10-18 PROCEDURE — 250N000013 HC RX MED GY IP 250 OP 250 PS 637: Performed by: SURGERY

## 2024-10-18 PROCEDURE — 250N000011 HC RX IP 250 OP 636: Performed by: NURSE ANESTHETIST, CERTIFIED REGISTERED

## 2024-10-18 PROCEDURE — 43282 LAP PARAESOPH HER RPR W/MESH: CPT | Performed by: SURGERY

## 2024-10-18 PROCEDURE — 0DV44ZZ RESTRICTION OF ESOPHAGOGASTRIC JUNCTION, PERCUTANEOUS ENDOSCOPIC APPROACH: ICD-10-PCS | Performed by: SURGERY

## 2024-10-18 PROCEDURE — 43282 LAP PARAESOPH HER RPR W/MESH: CPT | Performed by: ANESTHESIOLOGY

## 2024-10-18 PROCEDURE — 250N000009 HC RX 250: Performed by: SURGERY

## 2024-10-18 PROCEDURE — 36415 COLL VENOUS BLD VENIPUNCTURE: CPT | Performed by: SURGERY

## 2024-10-18 PROCEDURE — 250N000009 HC RX 250: Performed by: ANESTHESIOLOGY

## 2024-10-18 PROCEDURE — 82565 ASSAY OF CREATININE: CPT | Performed by: SURGERY

## 2024-10-18 DEVICE — BARD® PTFE FELT, 5.1 CM X 5.1 CM
Type: IMPLANTABLE DEVICE | Site: ABDOMEN | Status: FUNCTIONAL
Brand: BARD® PTFE FELT

## 2024-10-18 RX ORDER — CEFAZOLIN SODIUM/WATER 2 G/20 ML
2 SYRINGE (ML) INTRAVENOUS
Status: DISCONTINUED | OUTPATIENT
Start: 2024-10-18 | End: 2024-10-19

## 2024-10-18 RX ORDER — TRAMADOL HYDROCHLORIDE 50 MG/1
50 TABLET ORAL EVERY 6 HOURS PRN
Status: DISCONTINUED | OUTPATIENT
Start: 2024-10-18 | End: 2024-10-19 | Stop reason: HOSPADM

## 2024-10-18 RX ORDER — FENTANYL CITRATE 50 UG/ML
INJECTION, SOLUTION INTRAMUSCULAR; INTRAVENOUS PRN
Status: DISCONTINUED | OUTPATIENT
Start: 2024-10-18 | End: 2024-10-18

## 2024-10-18 RX ORDER — BUPIVACAINE HYDROCHLORIDE 2.5 MG/ML
INJECTION, SOLUTION INFILTRATION; PERINEURAL PRN
Status: DISCONTINUED | OUTPATIENT
Start: 2024-10-18 | End: 2024-10-18 | Stop reason: HOSPADM

## 2024-10-18 RX ORDER — CEFAZOLIN SODIUM/WATER 2 G/20 ML
2 SYRINGE (ML) INTRAVENOUS SEE ADMIN INSTRUCTIONS
Status: DISCONTINUED | OUTPATIENT
Start: 2024-10-18 | End: 2024-10-19

## 2024-10-18 RX ORDER — ONDANSETRON 4 MG/1
4 TABLET, ORALLY DISINTEGRATING ORAL EVERY 30 MIN PRN
Status: DISCONTINUED | OUTPATIENT
Start: 2024-10-18 | End: 2024-10-18 | Stop reason: HOSPADM

## 2024-10-18 RX ORDER — FENTANYL CITRATE 50 UG/ML
25 INJECTION, SOLUTION INTRAMUSCULAR; INTRAVENOUS EVERY 5 MIN PRN
Status: DISCONTINUED | OUTPATIENT
Start: 2024-10-18 | End: 2024-10-18 | Stop reason: HOSPADM

## 2024-10-18 RX ORDER — ACETAMINOPHEN 325 MG/1
975 TABLET ORAL EVERY 8 HOURS
Status: DISCONTINUED | OUTPATIENT
Start: 2024-10-18 | End: 2024-10-19 | Stop reason: HOSPADM

## 2024-10-18 RX ORDER — ONDANSETRON 2 MG/ML
INJECTION INTRAMUSCULAR; INTRAVENOUS PRN
Status: DISCONTINUED | OUTPATIENT
Start: 2024-10-18 | End: 2024-10-18

## 2024-10-18 RX ORDER — ONDANSETRON 2 MG/ML
4 INJECTION INTRAMUSCULAR; INTRAVENOUS EVERY 30 MIN PRN
Status: DISCONTINUED | OUTPATIENT
Start: 2024-10-18 | End: 2024-10-18

## 2024-10-18 RX ORDER — DEXAMETHASONE SODIUM PHOSPHATE 4 MG/ML
4 INJECTION, SOLUTION INTRA-ARTICULAR; INTRALESIONAL; INTRAMUSCULAR; INTRAVENOUS; SOFT TISSUE
Status: DISCONTINUED | OUTPATIENT
Start: 2024-10-18 | End: 2024-10-18 | Stop reason: HOSPADM

## 2024-10-18 RX ORDER — DEXAMETHASONE SODIUM PHOSPHATE 10 MG/ML
INJECTION, SOLUTION INTRAMUSCULAR; INTRAVENOUS PRN
Status: DISCONTINUED | OUTPATIENT
Start: 2024-10-18 | End: 2024-10-18

## 2024-10-18 RX ORDER — ASPIRIN 81 MG/1
81 TABLET ORAL DAILY
Status: DISCONTINUED | OUTPATIENT
Start: 2024-10-19 | End: 2024-10-19 | Stop reason: HOSPADM

## 2024-10-18 RX ORDER — EPHEDRINE SULFATE 50 MG/ML
INJECTION, SOLUTION INTRAMUSCULAR; INTRAVENOUS; SUBCUTANEOUS PRN
Status: DISCONTINUED | OUTPATIENT
Start: 2024-10-18 | End: 2024-10-18

## 2024-10-18 RX ORDER — NALOXONE HYDROCHLORIDE 0.4 MG/ML
0.1 INJECTION, SOLUTION INTRAMUSCULAR; INTRAVENOUS; SUBCUTANEOUS
Status: DISCONTINUED | OUTPATIENT
Start: 2024-10-18 | End: 2024-10-18

## 2024-10-18 RX ORDER — ONDANSETRON 2 MG/ML
4 INJECTION INTRAMUSCULAR; INTRAVENOUS EVERY 6 HOURS PRN
Status: DISCONTINUED | OUTPATIENT
Start: 2024-10-18 | End: 2024-10-19 | Stop reason: HOSPADM

## 2024-10-18 RX ORDER — LIDOCAINE HYDROCHLORIDE 10 MG/ML
INJECTION, SOLUTION INFILTRATION; PERINEURAL PRN
Status: DISCONTINUED | OUTPATIENT
Start: 2024-10-18 | End: 2024-10-18

## 2024-10-18 RX ORDER — BISACODYL 10 MG
10 SUPPOSITORY, RECTAL RECTAL DAILY PRN
Status: DISCONTINUED | OUTPATIENT
Start: 2024-10-21 | End: 2024-10-19 | Stop reason: HOSPADM

## 2024-10-18 RX ORDER — DEXTROSE MONOHYDRATE, SODIUM CHLORIDE, AND POTASSIUM CHLORIDE 50; 1.49; 4.5 G/1000ML; G/1000ML; G/1000ML
INJECTION, SOLUTION INTRAVENOUS CONTINUOUS
Status: DISCONTINUED | OUTPATIENT
Start: 2024-10-18 | End: 2024-10-19 | Stop reason: HOSPADM

## 2024-10-18 RX ORDER — NALOXONE HYDROCHLORIDE 0.4 MG/ML
0.4 INJECTION, SOLUTION INTRAMUSCULAR; INTRAVENOUS; SUBCUTANEOUS
Status: DISCONTINUED | OUTPATIENT
Start: 2024-10-18 | End: 2024-10-19 | Stop reason: HOSPADM

## 2024-10-18 RX ORDER — ONDANSETRON 4 MG/1
4 TABLET, ORALLY DISINTEGRATING ORAL EVERY 6 HOURS PRN
Status: DISCONTINUED | OUTPATIENT
Start: 2024-10-18 | End: 2024-10-19 | Stop reason: HOSPADM

## 2024-10-18 RX ORDER — POLYETHYLENE GLYCOL 3350 17 G/17G
17 POWDER, FOR SOLUTION ORAL DAILY
Status: DISCONTINUED | OUTPATIENT
Start: 2024-10-19 | End: 2024-10-19 | Stop reason: HOSPADM

## 2024-10-18 RX ORDER — SODIUM CHLORIDE, SODIUM LACTATE, POTASSIUM CHLORIDE, CALCIUM CHLORIDE 600; 310; 30; 20 MG/100ML; MG/100ML; MG/100ML; MG/100ML
INJECTION, SOLUTION INTRAVENOUS CONTINUOUS PRN
Status: DISCONTINUED | OUTPATIENT
Start: 2024-10-18 | End: 2024-10-18

## 2024-10-18 RX ORDER — NALOXONE HYDROCHLORIDE 0.4 MG/ML
0.2 INJECTION, SOLUTION INTRAMUSCULAR; INTRAVENOUS; SUBCUTANEOUS
Status: DISCONTINUED | OUTPATIENT
Start: 2024-10-18 | End: 2024-10-19 | Stop reason: HOSPADM

## 2024-10-18 RX ORDER — OXYCODONE HYDROCHLORIDE 5 MG/1
5 TABLET ORAL
Status: DISCONTINUED | OUTPATIENT
Start: 2024-10-18 | End: 2024-10-19

## 2024-10-18 RX ORDER — HYDROMORPHONE HCL IN WATER/PF 6 MG/30 ML
0.4 PATIENT CONTROLLED ANALGESIA SYRINGE INTRAVENOUS
Status: DISCONTINUED | OUTPATIENT
Start: 2024-10-18 | End: 2024-10-19

## 2024-10-18 RX ORDER — PROCHLORPERAZINE MALEATE 5 MG/1
5 TABLET ORAL EVERY 6 HOURS PRN
Status: DISCONTINUED | OUTPATIENT
Start: 2024-10-18 | End: 2024-10-19 | Stop reason: HOSPADM

## 2024-10-18 RX ORDER — ACETAMINOPHEN 325 MG/1
975 TABLET ORAL ONCE
Status: COMPLETED | OUTPATIENT
Start: 2024-10-18 | End: 2024-10-18

## 2024-10-18 RX ORDER — OXYCODONE HYDROCHLORIDE 5 MG/1
10 TABLET ORAL
Status: DISCONTINUED | OUTPATIENT
Start: 2024-10-18 | End: 2024-10-19

## 2024-10-18 RX ORDER — KETOROLAC TROMETHAMINE 30 MG/ML
INJECTION, SOLUTION INTRAMUSCULAR; INTRAVENOUS PRN
Status: DISCONTINUED | OUTPATIENT
Start: 2024-10-18 | End: 2024-10-18

## 2024-10-18 RX ORDER — AMOXICILLIN 250 MG
1 CAPSULE ORAL 2 TIMES DAILY
Status: DISCONTINUED | OUTPATIENT
Start: 2024-10-18 | End: 2024-10-19 | Stop reason: HOSPADM

## 2024-10-18 RX ORDER — PROPOFOL 10 MG/ML
INJECTION, EMULSION INTRAVENOUS PRN
Status: DISCONTINUED | OUTPATIENT
Start: 2024-10-18 | End: 2024-10-18

## 2024-10-18 RX ORDER — DEXAMETHASONE SODIUM PHOSPHATE 10 MG/ML
4 INJECTION, SOLUTION INTRAMUSCULAR; INTRAVENOUS
Status: DISCONTINUED | OUTPATIENT
Start: 2024-10-18 | End: 2024-10-19

## 2024-10-18 RX ORDER — LIDOCAINE 40 MG/G
CREAM TOPICAL
Status: DISCONTINUED | OUTPATIENT
Start: 2024-10-18 | End: 2024-10-19 | Stop reason: HOSPADM

## 2024-10-18 RX ORDER — LIDOCAINE 40 MG/G
CREAM TOPICAL
Status: DISCONTINUED | OUTPATIENT
Start: 2024-10-18 | End: 2024-10-19

## 2024-10-18 RX ORDER — ONDANSETRON 4 MG/1
4 TABLET, ORALLY DISINTEGRATING ORAL EVERY 30 MIN PRN
Status: DISCONTINUED | OUTPATIENT
Start: 2024-10-18 | End: 2024-10-18

## 2024-10-18 RX ORDER — METOPROLOL SUCCINATE 50 MG/1
50 TABLET, EXTENDED RELEASE ORAL DAILY
Status: DISCONTINUED | OUTPATIENT
Start: 2024-10-19 | End: 2024-10-19 | Stop reason: HOSPADM

## 2024-10-18 RX ORDER — FENTANYL CITRATE 50 UG/ML
50 INJECTION, SOLUTION INTRAMUSCULAR; INTRAVENOUS EVERY 5 MIN PRN
Status: DISCONTINUED | OUTPATIENT
Start: 2024-10-18 | End: 2024-10-18 | Stop reason: HOSPADM

## 2024-10-18 RX ORDER — ENOXAPARIN SODIUM 100 MG/ML
40 INJECTION SUBCUTANEOUS EVERY 24 HOURS
Status: DISCONTINUED | OUTPATIENT
Start: 2024-10-19 | End: 2024-10-19 | Stop reason: HOSPADM

## 2024-10-18 RX ORDER — NALOXONE HYDROCHLORIDE 1 MG/ML
0.1 INJECTION INTRAMUSCULAR; INTRAVENOUS; SUBCUTANEOUS
Status: DISCONTINUED | OUTPATIENT
Start: 2024-10-18 | End: 2024-10-18 | Stop reason: HOSPADM

## 2024-10-18 RX ORDER — ROSUVASTATIN CALCIUM 40 MG/1
40 TABLET, COATED ORAL DAILY
Status: DISCONTINUED | OUTPATIENT
Start: 2024-10-19 | End: 2024-10-19 | Stop reason: HOSPADM

## 2024-10-18 RX ORDER — ACETAMINOPHEN 325 MG/1
650 TABLET ORAL EVERY 4 HOURS PRN
Status: DISCONTINUED | OUTPATIENT
Start: 2024-10-21 | End: 2024-10-19 | Stop reason: HOSPADM

## 2024-10-18 RX ORDER — HYDROMORPHONE HCL IN WATER/PF 6 MG/30 ML
0.2 PATIENT CONTROLLED ANALGESIA SYRINGE INTRAVENOUS
Status: DISCONTINUED | OUTPATIENT
Start: 2024-10-18 | End: 2024-10-19

## 2024-10-18 RX ORDER — ONDANSETRON 2 MG/ML
4 INJECTION INTRAMUSCULAR; INTRAVENOUS EVERY 30 MIN PRN
Status: DISCONTINUED | OUTPATIENT
Start: 2024-10-18 | End: 2024-10-18 | Stop reason: HOSPADM

## 2024-10-18 RX ORDER — PANTOPRAZOLE SODIUM 40 MG/1
40 TABLET, DELAYED RELEASE ORAL
Status: DISCONTINUED | OUTPATIENT
Start: 2024-10-18 | End: 2024-10-19 | Stop reason: HOSPADM

## 2024-10-18 RX ORDER — LISINOPRIL 2.5 MG/1
2.5 TABLET ORAL DAILY
Status: DISCONTINUED | OUTPATIENT
Start: 2024-10-19 | End: 2024-10-19 | Stop reason: HOSPADM

## 2024-10-18 RX ADMIN — PHENYLEPHRINE HYDROCHLORIDE 200 MCG: 10 INJECTION INTRAVENOUS at 14:21

## 2024-10-18 RX ADMIN — PROPOFOL 150 MG: 10 INJECTION, EMULSION INTRAVENOUS at 14:06

## 2024-10-18 RX ADMIN — HYDROMORPHONE HYDROCHLORIDE 0.4 MG: 1 INJECTION, SOLUTION INTRAMUSCULAR; INTRAVENOUS; SUBCUTANEOUS at 16:55

## 2024-10-18 RX ADMIN — Medication 10 MG: at 15:43

## 2024-10-18 RX ADMIN — ONDANSETRON 4 MG: 2 INJECTION INTRAMUSCULAR; INTRAVENOUS at 16:05

## 2024-10-18 RX ADMIN — HYDROMORPHONE HYDROCHLORIDE 0.5 MG: 1 INJECTION, SOLUTION INTRAMUSCULAR; INTRAVENOUS; SUBCUTANEOUS at 15:10

## 2024-10-18 RX ADMIN — PHENYLEPHRINE HYDROCHLORIDE 200 MCG: 10 INJECTION INTRAVENOUS at 14:38

## 2024-10-18 RX ADMIN — ROCURONIUM BROMIDE 20 MG: 50 INJECTION, SOLUTION INTRAVENOUS at 14:56

## 2024-10-18 RX ADMIN — FENTANYL CITRATE 50 MCG: 50 INJECTION, SOLUTION INTRAMUSCULAR; INTRAVENOUS at 16:46

## 2024-10-18 RX ADMIN — ROCURONIUM BROMIDE 20 MG: 50 INJECTION, SOLUTION INTRAVENOUS at 15:46

## 2024-10-18 RX ADMIN — KETOROLAC TROMETHAMINE 15 MG: 30 INJECTION, SOLUTION INTRAMUSCULAR at 16:15

## 2024-10-18 RX ADMIN — Medication 5 MG: at 14:55

## 2024-10-18 RX ADMIN — FENTANYL CITRATE 50 MCG: 50 INJECTION, SOLUTION INTRAMUSCULAR; INTRAVENOUS at 16:39

## 2024-10-18 RX ADMIN — DEXMEDETOMIDINE HYDROCHLORIDE 8 MCG: 100 INJECTION, SOLUTION INTRAVENOUS at 16:22

## 2024-10-18 RX ADMIN — HYDROMORPHONE HYDROCHLORIDE 0.5 MG: 1 INJECTION, SOLUTION INTRAMUSCULAR; INTRAVENOUS; SUBCUTANEOUS at 16:16

## 2024-10-18 RX ADMIN — FENTANYL CITRATE 50 MCG: 50 INJECTION INTRAMUSCULAR; INTRAVENOUS at 13:49

## 2024-10-18 RX ADMIN — PHENYLEPHRINE HYDROCHLORIDE 50 MCG: 10 INJECTION INTRAVENOUS at 14:06

## 2024-10-18 RX ADMIN — TRAMADOL HYDROCHLORIDE 50 MG: 50 TABLET, COATED ORAL at 21:05

## 2024-10-18 RX ADMIN — FENTANYL CITRATE 50 MCG: 50 INJECTION INTRAMUSCULAR; INTRAVENOUS at 14:06

## 2024-10-18 RX ADMIN — SODIUM CHLORIDE, POTASSIUM CHLORIDE, SODIUM LACTATE AND CALCIUM CHLORIDE: 600; 310; 30; 20 INJECTION, SOLUTION INTRAVENOUS at 13:57

## 2024-10-18 RX ADMIN — DEXMEDETOMIDINE HYDROCHLORIDE 8 MCG: 100 INJECTION, SOLUTION INTRAVENOUS at 16:27

## 2024-10-18 RX ADMIN — ACETAMINOPHEN 975 MG: 325 TABLET ORAL at 09:31

## 2024-10-18 RX ADMIN — DEXAMETHASONE SODIUM PHOSPHATE 10 MG: 10 INJECTION, SOLUTION INTRAMUSCULAR; INTRAVENOUS at 14:06

## 2024-10-18 RX ADMIN — SUGAMMADEX 200 MG: 100 INJECTION, SOLUTION INTRAVENOUS at 16:09

## 2024-10-18 RX ADMIN — LIDOCAINE HYDROCHLORIDE 5 ML: 10 INJECTION, SOLUTION INFILTRATION; PERINEURAL at 14:06

## 2024-10-18 RX ADMIN — ROCURONIUM BROMIDE 10 MG: 50 INJECTION, SOLUTION INTRAVENOUS at 15:29

## 2024-10-18 RX ADMIN — ROCURONIUM BROMIDE 70 MG: 50 INJECTION, SOLUTION INTRAVENOUS at 14:06

## 2024-10-18 RX ADMIN — HYDROMORPHONE HYDROCHLORIDE 0.4 MG: 1 INJECTION, SOLUTION INTRAMUSCULAR; INTRAVENOUS; SUBCUTANEOUS at 17:09

## 2024-10-18 RX ADMIN — PHENYLEPHRINE HYDROCHLORIDE 100 MCG: 10 INJECTION INTRAVENOUS at 14:18

## 2024-10-18 RX ADMIN — SENNOSIDES AND DOCUSATE SODIUM 1 TABLET: 8.6; 5 TABLET ORAL at 21:05

## 2024-10-18 RX ADMIN — Medication 2 G: at 14:08

## 2024-10-18 RX ADMIN — Medication 10 MG: at 14:41

## 2024-10-18 RX ADMIN — POTASSIUM CHLORIDE, DEXTROSE MONOHYDRATE AND SODIUM CHLORIDE 50 ML/HR: 150; 5; 450 INJECTION, SOLUTION INTRAVENOUS at 17:13

## 2024-10-18 RX ADMIN — HYDROMORPHONE HYDROCHLORIDE 0.5 MG: 1 INJECTION, SOLUTION INTRAMUSCULAR; INTRAVENOUS; SUBCUTANEOUS at 16:29

## 2024-10-18 RX ADMIN — PANTOPRAZOLE SODIUM 40 MG: 40 TABLET, DELAYED RELEASE ORAL at 18:13

## 2024-10-18 RX ADMIN — ACETAMINOPHEN 975 MG: 325 TABLET ORAL at 18:13

## 2024-10-18 RX ADMIN — HYDROMORPHONE HYDROCHLORIDE 0.4 MG: 1 INJECTION, SOLUTION INTRAMUSCULAR; INTRAVENOUS; SUBCUTANEOUS at 17:15

## 2024-10-18 RX ADMIN — HYDROMORPHONE HYDROCHLORIDE 0.4 MG: 1 INJECTION, SOLUTION INTRAMUSCULAR; INTRAVENOUS; SUBCUTANEOUS at 17:01

## 2024-10-18 ASSESSMENT — ACTIVITIES OF DAILY LIVING (ADL)
ADLS_ACUITY_SCORE: 22
ADLS_ACUITY_SCORE: 29
ADLS_ACUITY_SCORE: 29
ADLS_ACUITY_SCORE: 22
ADLS_ACUITY_SCORE: 35
ADLS_ACUITY_SCORE: 22
ADLS_ACUITY_SCORE: 29
ADLS_ACUITY_SCORE: 22
ADLS_ACUITY_SCORE: 29

## 2024-10-18 NOTE — ANESTHESIA PROCEDURE NOTES
Airway       Patient location during procedure: OR       Procedure Start/Stop Times: 10/18/2024 2:09 PM  Staff -        CRNA: Judson Khan APRN CRNA       Performed By: CRNA  Consent for Airway        Urgency: elective  Indications and Patient Condition       Indications for airway management: sharon-procedural       Induction type:intravenous       Mask difficulty assessment: 1 - vent by mask    Final Airway Details       Final airway type: endotracheal airway       Successful airway: ETT - single  Endotracheal Airway Details        ETT size (mm): 8.0       Cuffed: yes       Successful intubation technique: direct laryngoscopy       DL Blade Type: Dowell 2       Grade View of Cords: 1       Adjucts: stylet       Position: Right       Measured from: lips       Secured at (cm): 23       Bite block used: None    Post intubation assessment        Placement verified by: capnometry, equal breath sounds and chest rise        Number of attempts at approach: 1       Number of other approaches attempted: 0       Secured with: silk tape       Ease of procedure: easy       Dentition: Intact    Medication(s) Administered   Medication Administration Time: 10/18/2024 2:09 PM

## 2024-10-18 NOTE — PHARMACY-ADMISSION MEDICATION HISTORY
Pharmacist Admission Medication History    Admission medication history is complete. The information provided in this note is only as accurate as the sources available at the time of the update.    Information Source(s): Patient, Clinic records, and CareEverywhere/SureScripts via in-person    Pertinent Information: none    Allergies reviewed with patient and updates made in EHR: yes    Medication History Completed By: Francis Styles RP 10/18/2024 8:14 AM    PTA Med List   Medication Sig Note Last Dose    aspirin 81 MG EC tablet [ASPIRIN 81 MG EC TABLET] Take 81 mg by mouth daily.  10/12/2024    B Complex Vitamins (B COMPLEX PO) Take 1 capsule by mouth daily  10/12/2024    Cholecalciferol (VITAMIN D3 PO) Take 1,000 Units by mouth daily.  10/12/2024    clopidogrel (PLAVIX) 75 MG tablet Take 75 mg by mouth daily 10/16/2024: Stopped 10/11 10/11/2024    Cyanocobalamin 2000 MCG TBCR Take 1 tablet by mouth daily.  10/12/2024    latanoprost (XALATAN) 0.005 % ophthalmic solution Place 1 drop into both eyes at bedtime  10/17/2024    lisinopril (PRINIVIL,ZESTRIL) 2.5 MG tablet [LISINOPRIL (PRINIVIL,ZESTRIL) 2.5 MG TABLET] Take 2.5 mg by mouth daily.  10/18/2024    metoprolol succinate (TOPROL-XL) 50 MG 24 hr tablet [METOPROLOL SUCCINATE (TOPROL-XL) 50 MG 24 HR TABLET] Take 1 tablet (50 mg total) by mouth daily.  10/18/2024    multivitamin w/minerals (MULTI-VITAMIN) tablet Take 1 tablet by mouth daily.  Past Month    nitroGLYcerin (NITROSTAT) 0.4 MG sublingual tablet [NITROGLYCERIN (NITROSTAT) 0.4 MG SL TABLET] PLACE 1 TABLET UNDER THE TONGUE EVERY 5MIN AS NEEDED FOR CHEST PAIN. IF SYMPTOMS DO NOT RESOLVE IN 5MIN, REPEAT DOSE AND CALL 911      omeprazole (PRILOSEC) 20 MG capsule Take 20 mg by mouth 2 times daily (before meals)   10/18/2024 at AM    rosuvastatin (CRESTOR) 40 MG tablet Take 1 tablet (40 mg) by mouth daily  10/18/2024

## 2024-10-18 NOTE — OP NOTE
[facility]  Operative Note    Pre-operative diagnosis: Hiatal hernia [K44.9]   Post-operative diagnosis Hiatal hernia with GERD   Procedure: Procedure(s):  HIATAL HERNIA REPAIR WITH FUNDOPLICATION, PARTIAL POSTERIOR, ROBOT-ASSISTED, LAPAROSCOPIC, USING DA VENESSA XI   Surgeon: Emile Mcintyre MD   Assistants(s): None   Anesthesia: General    Estimated blood loss: Less than 50 ml    Total IV fluids: (See anesthesia record)   Blood transfusion: No transfusion was given during surgery   Total urine output: (See anesthesia record)   Drains: None   Specimens: None   Implants: None   Findings: Large hiatal hernia .   Complications: None.   Condition: Stable       Description of procedure:  The patient was brought to the operating room where after induction of general anesthesia with endotracheal intubation he was positioned with both arms out.  He was then prepped and draped in sterile fashion.  After procedural pause, we began by accessing the abdomen with a Veress needle in the right mid abdomen.  After insufflation for 8 mm trocars were placed across the mid abdomen and a Yuliana liver retractor in the epigastrium.  Bilateral tap blocks were then performed.  The patient was then put into reverse Trendelenburg body positioning and the da Venessa X Xi was docked.  A large hiatal hernia was present containing roughly 1/3-1/2 of the stomach.  We began by dividing the pars flaccida with the LigaSure device.  This allowed us to identify the right branch of the diaphragmatic christina.  We then proceeded to divide along this,  the christina on the right from the herniating fatty tissues along the lesser curvature of the stomach.  Proceeding in the avascular plane into the mediastinum, we then proceeded to divide the peritoneal lining along the anterior arch of the diaphragmatic hiatus with the vessel sealer, reducing the hernia sac as we progressed.  This was carried out in a clockwise fashion reducing  the hernia sac out of the mediastinum until we were down along the left branch of the diaphragmatic christina at 4:00.  We then turned our attention to the greater curvature of the stomach.  Roughly 10 to 15 cm distal to the angle of Hiss we began dividing the short gastric vessels with the vessel sealer, carrying this up to the angle of Hiss and the hernia sac along the left branch of the diaphragmatic christina.  With the anterior hernia sac thus reduced in for the most part mobilized, we proceeded to excise it off the gastroesophageal junction with the vessel sealer.  This was then tucked into the left upper quadrant for retrieval at the end of the case.  We were then able to dissect posterior to the esophagus and the mediastinum, enabling us to pass a Penrose drain posterior to it for downward traction.  With the esophagus thus retracted anteriorly we able to clear away the avascular connective tissues posterior to it well into the mediastinum, facilitating the positioning of the gastroesophageal junction in the abdomen without requiring tension.  All connective tissues posterior to the esophagus down to the christina of the diaphragm posteriorly was cleared away.  Given the moderate size hiatal defect, we lifted to close this with felt pledgets on either side of the pragmatically was.  Utilizing a 0 nonabsorbable V-Loc suture the diaphragmatic christina was reapproximated with the felt pledget strips on either side to reduce tension and tearing of the muscle fibers of the diaphragm.  This was closed from the christina anteriorly, leaving room for the esophagus and a 42 Panamanian bougie with alongside for one of ou laparoscopic instruments.  Satisfied with the diaphragmatic closure, we then proceeded to pull the fundus of the stomach posterior to the esophagus, performing a shoeshine maneuver to ensure there was no twisting.  The fundus of the stomach was then sutured along the right side of the esophagus over a 3 cm span, affixing it  proximally to the diaphragm with a running 3-0 absorbable V-Loc suture.  Along the left side of the esophagus, the greater curvature of the fundus was also affixed to the lateral side of the esophagus over a 3 cm span and affixed proximally to the diaphragm with a second 3-0 absorbable V-Loc suture.  This completed a posterior 180 degree fundoplication.  At this point our bougie was removed from the esophagus.  The Penrose drain was removed from the abdomen along with all the needles.  The hernia sac was then removed.  The Yuliana liver retractor was taken down, insufflation released and trocars removed.  The skin incisions were then closed with interrupted 4-0 Vicryl sutures.    Emile Mcintyre MD, FACS  [phone]  [facility]  General and Bariatric Surgery

## 2024-10-18 NOTE — ANESTHESIA PREPROCEDURE EVALUATION
Anesthesia Pre-Procedure Evaluation    Patient: Bertrand Palacios   MRN: [id number] : 1955        Procedure : Procedure(s):  HIATAL HERNIA REPAIR WITH FUNDOPLICATION, PARTIAL POSTERIOR, ROBOT-ASSISTED, LAPAROSCOPIC, USING DA SARITHA XI          Past Medical History:   Diagnosis Date     Cancer (H)     bladder     Coronary artery disease     S/P PCI in  Per H&P     Hiatal hernia     Per H&P     History of ASCVD     per H&P     HLD (hyperlipidemia)      Hypertension      Stented coronary artery       Past Surgical History:   Procedure Laterality Date     BLADDER SURGERY  2008    tumor      CARDIAC CATHETERIZATION       Per H&P     CHOLECYSTECTOMY       CV CORONARY ANGIOGRAM N/A 2020    Procedure: Coronary Angiogram;  Surgeon: Tony Juárez MD;  Location: [facility] Cath Lab;  Service: Cardiology     CV CORONARY ANGIOGRAM N/A 2024    Procedure: Coronary Angiogram;  Surgeon: Christopher Hogan MD;  Location: [facility] CATH LAB CV     CV HEART CATHETERIZATION WITH POSSIBLE INTERVENTION N/A 07/15/2021    Procedure: Coronary Angiogram;  Surgeon: Tony Juárez MD;  Location: [facility] CATH LAB CV     CV LEFT HEART CATH N/A 2024    Procedure: Left Heart Catheterization;  Surgeon: Christopher Hogan MD;  Location: [facility] CV     CV PCI N/A 07/15/2021    Procedure: PERCUTANEOUS CORONARY INTERVENTION;  Surgeon: Tony Juárez MD;  Location: [facility] CATH LAB CV     GENITOURINARY SURGERY       IR MISCELLANEOUS PROCEDURE  2006      Allergies   Allergen Reactions     Ticagrelor Shortness Of Breath     Isosorbide Headache      Social History     Tobacco Use     Smoking status: Former     Current packs/day: 0.00     Types: Cigarettes     Quit date: 2008     Years since quitting: 15.9     Smokeless tobacco: Never   Substance Use Topics     Alcohol use: Never      Wt Readings from Last 1 Encounters:   10/18/24 102.8 kg (226 lb 11.2 oz)        Anesthesia Evaluation    "         ROS/MED HX  ENT/Pulmonary:       Neurologic:       Cardiovascular: Comment: Interpretation Summary     1. Normal left ventricular size and systolic performance with a visually  estimated ejection fraction of 50-55%.  2. There is mild distal lateral and distal anterior hypokinesis.  3. No significant valvular heart disease is identified on this study.  4. Normal right ventricular size and systolic performance.     The prior real-time echocardiogram dated 17 July 2020 could not be accessed  from the digital archive for direct comparison.      (+)  hypertension- -  CAD -  - stent- 7          GALINDO.                        (-) angina and angina   METS/Exercise Tolerance:     Hematologic:       Musculoskeletal:       GI/Hepatic:     (+)      hiatal hernia,              Renal/Genitourinary:       Endo:       Psychiatric/Substance Use:       Infectious Disease:       Malignancy:       Other:            Physical Exam    Airway        Mallampati: II   TM distance: > 3 FB   Neck ROM: full     Respiratory Devices and Support         Dental       (+) Minor Abnormalities - some fillings, tiny chips      Cardiovascular   cardiovascular exam normal          Pulmonary   pulmonary exam normal            OUTSIDE LABS:  CBC:   Lab Results   Component Value Date    WBC 10.1 01/25/2024    WBC 9.7 01/24/2024    HGB 15.6 01/25/2024    HGB 16.3 01/24/2024    HCT 46.7 01/25/2024    HCT 48.1 01/24/2024     01/25/2024     01/24/2024     BMP:   Lab Results   Component Value Date     01/25/2024     01/24/2024    POTASSIUM 4.1 01/25/2024    POTASSIUM 4.1 01/24/2024    CHLORIDE 109 (H) 01/25/2024    CHLORIDE 110 (H) 01/24/2024    CO2 24 01/25/2024    CO2 23 01/24/2024    BUN 18.9 01/25/2024    BUN 17.7 01/24/2024    CR 0.97 01/25/2024    CR 0.89 01/24/2024     (H) 01/25/2024     (H) 01/24/2024     COAGS: No results found for: \"PTT\", \"INR\", \"FIBR\"  POC: No results found for: \"BGM\", \"HCG\", " "\"HCGS\"  HEPATIC:   Lab Results   Component Value Date    ALBUMIN 3.6 01/25/2024    PROTTOTAL 6.2 (L) 01/25/2024    ALT 20 01/25/2024    AST 17 01/25/2024    ALKPHOS 71 01/25/2024    BILITOTAL 0.4 01/25/2024     OTHER:   Lab Results   Component Value Date    LESLIE 8.6 (L) 01/25/2024    MAG 1.9 02/13/2020    LIPASE 28 01/24/2024       Anesthesia Plan    ASA Status:  3    NPO Status:  NPO Appropriate    Anesthesia Type: General.              Consents    Anesthesia Plan(s) and associated risks, benefits, and realistic alternatives discussed. Questions answered and patient/representative(s) expressed understanding.     - Discussed: Risks, Benefits and Alternatives for BOTH SEDATION and the PROCEDURE were discussed     - Discussed with:  Patient      - Extended Intubation/Ventilatory Support Discussed: No.      - Patient is DNR/DNI Status: No     Use of blood products discussed: No .     Postoperative Care    Pain management: IV analgesics, Oral pain medications.   PONV prophylaxis: Ondansetron (or other 5HT-3), Dexamethasone or Solumedrol     Comments:             Nithin Velazquez,     I have reviewed the pertinent notes and labs in the chart from the past 30 days and (re)examined the patient.  Any updates or changes from those notes are reflected in this note.             # Drug Induced Platelet Defect: home medication list includes an antiplatelet medication   # Hypertension: Noted on problem list         # Obesity: Estimated body mass index is 34.47 kg/m  as calculated from the following:    Height as of this encounter: 1.727 m (5' 8\").    Weight as of this encounter: 102.8 kg (226 lb 11.2 oz).       # Financial/Environmental Concerns:          "

## 2024-10-18 NOTE — INTERVAL H&P NOTE
"I have reviewed the surgical (or preoperative) H&P that is linked to this encounter, and examined the patient. There are no significant changes    Emile Mcintyre MD, FACS  Office: [phone]  [facility]   General and Bariatric Surgery      Clinical Conditions Present on Arrival:  Clinically Significant Risk Factors Present on Admission                  # Drug Induced Platelet Defect: home medication list includes an antiplatelet medication      # Obesity: Estimated body mass index is 34.47 kg/m  as calculated from the following:    Height as of this encounter: 1.727 m (5' 8\").    Weight as of this encounter: 102.8 kg (226 lb 11.2 oz).       "

## 2024-10-18 NOTE — OR NURSING
Patient arrived to PACU with chest/epigastric pain 9-10/10.  Dr. Baldwin notified and 12 lead EKG done and read.  Patient given 100mcg fentanyl and 1.6mg dilauadid. Pain. Pain now 2/10. Resting between checks, RR 12 remains on o2 NC at 3 liters , sats 96-97%

## 2024-10-19 VITALS
SYSTOLIC BLOOD PRESSURE: 133 MMHG | DIASTOLIC BLOOD PRESSURE: 71 MMHG | OXYGEN SATURATION: 90 % | RESPIRATION RATE: 18 BRPM | HEART RATE: 84 BPM | TEMPERATURE: 98.1 F | WEIGHT: 224.21 LBS | BODY MASS INDEX: 33.98 KG/M2 | HEIGHT: 68 IN

## 2024-10-19 LAB
ATRIAL RATE - MUSE: 85 BPM
CREAT SERPL-MCNC: 0.99 MG/DL (ref 0.67–1.17)
DIASTOLIC BLOOD PRESSURE - MUSE: NORMAL MMHG
EGFRCR SERPLBLD CKD-EPI 2021: 82 ML/MIN/1.73M2
GLUCOSE BLDC GLUCOMTR-MCNC: 190 MG/DL (ref 70–99)
INTERPRETATION ECG - MUSE: NORMAL
P AXIS - MUSE: 58 DEGREES
PLATELET # BLD AUTO: 170 10E3/UL (ref 150–450)
PR INTERVAL - MUSE: 184 MS
QRS DURATION - MUSE: 88 MS
QT - MUSE: 380 MS
QTC - MUSE: 452 MS
R AXIS - MUSE: -7 DEGREES
SYSTOLIC BLOOD PRESSURE - MUSE: NORMAL MMHG
T AXIS - MUSE: 51 DEGREES
VENTRICULAR RATE- MUSE: 85 BPM

## 2024-10-19 PROCEDURE — 36415 COLL VENOUS BLD VENIPUNCTURE: CPT | Performed by: SURGERY

## 2024-10-19 PROCEDURE — 250N000013 HC RX MED GY IP 250 OP 250 PS 637: Performed by: SURGERY

## 2024-10-19 PROCEDURE — 250N000011 HC RX IP 250 OP 636: Performed by: SURGERY

## 2024-10-19 PROCEDURE — 82565 ASSAY OF CREATININE: CPT | Performed by: SURGERY

## 2024-10-19 PROCEDURE — 85049 AUTOMATED PLATELET COUNT: CPT | Performed by: SURGERY

## 2024-10-19 RX ORDER — AMOXICILLIN 250 MG
1 CAPSULE ORAL DAILY
Qty: 30 TABLET | Refills: 0 | Status: SHIPPED | OUTPATIENT
Start: 2024-10-19

## 2024-10-19 RX ORDER — ACETAMINOPHEN 500 MG
500-1000 TABLET ORAL EVERY 6 HOURS PRN
COMMUNITY
Start: 2024-10-19

## 2024-10-19 RX ORDER — TRAMADOL HYDROCHLORIDE 50 MG/1
50 TABLET ORAL EVERY 6 HOURS PRN
Qty: 10 TABLET | Refills: 0 | Status: SHIPPED | OUTPATIENT
Start: 2024-10-19 | End: 2024-10-22

## 2024-10-19 RX ADMIN — ENOXAPARIN SODIUM 40 MG: 40 INJECTION SUBCUTANEOUS at 10:22

## 2024-10-19 RX ADMIN — PANTOPRAZOLE SODIUM 40 MG: 40 TABLET, DELAYED RELEASE ORAL at 15:35

## 2024-10-19 RX ADMIN — HYDROMORPHONE HYDROCHLORIDE 0.4 MG: 0.2 INJECTION, SOLUTION INTRAMUSCULAR; INTRAVENOUS; SUBCUTANEOUS at 09:01

## 2024-10-19 RX ADMIN — HYDROMORPHONE HYDROCHLORIDE 0.4 MG: 0.2 INJECTION, SOLUTION INTRAMUSCULAR; INTRAVENOUS; SUBCUTANEOUS at 00:56

## 2024-10-19 RX ADMIN — ACETAMINOPHEN 975 MG: 325 TABLET ORAL at 01:03

## 2024-10-19 RX ADMIN — ACETAMINOPHEN 975 MG: 325 TABLET ORAL at 16:05

## 2024-10-19 RX ADMIN — PANTOPRAZOLE SODIUM 40 MG: 40 TABLET, DELAYED RELEASE ORAL at 09:04

## 2024-10-19 RX ADMIN — POLYETHYLENE GLYCOL 3350 17 G: 17 POWDER, FOR SOLUTION ORAL at 09:04

## 2024-10-19 RX ADMIN — ROSUVASTATIN 40 MG: 40 TABLET, FILM COATED ORAL at 09:04

## 2024-10-19 RX ADMIN — METOPROLOL SUCCINATE 50 MG: 50 TABLET, EXTENDED RELEASE ORAL at 09:03

## 2024-10-19 RX ADMIN — ASPIRIN 81 MG: 81 TABLET, COATED ORAL at 09:03

## 2024-10-19 RX ADMIN — TRAMADOL HYDROCHLORIDE 50 MG: 50 TABLET, COATED ORAL at 15:35

## 2024-10-19 RX ADMIN — SENNOSIDES AND DOCUSATE SODIUM 1 TABLET: 8.6; 5 TABLET ORAL at 09:04

## 2024-10-19 RX ADMIN — ACETAMINOPHEN 975 MG: 325 TABLET ORAL at 09:03

## 2024-10-19 RX ADMIN — LISINOPRIL 2.5 MG: 2.5 TABLET ORAL at 09:03

## 2024-10-19 ASSESSMENT — ACTIVITIES OF DAILY LIVING (ADL)
ADLS_ACUITY_SCORE: 29
ADLS_ACUITY_SCORE: 22
ADLS_ACUITY_SCORE: 29
ADLS_ACUITY_SCORE: 29
ADLS_ACUITY_SCORE: 24
ADLS_ACUITY_SCORE: 29
ADLS_ACUITY_SCORE: 22
ADLS_ACUITY_SCORE: 29
ADLS_ACUITY_SCORE: 29
ADLS_ACUITY_SCORE: 22
ADLS_ACUITY_SCORE: 22
ADLS_ACUITY_SCORE: 29
ADLS_ACUITY_SCORE: 22
ADLS_ACUITY_SCORE: 29

## 2024-10-19 NOTE — DISCHARGE SUMMARY
Discharge Provider: Sarah Tony PA-C  Admission Date: 10/18/2024  Discharge Date: 10/19/2024     Primary Diagnosis at Discharge:   Patient Active Problem List   Diagnosis    CAD (coronary artery disease)    Essential hypertension, benign    Dyslipidemia, goal LDL below 70    Status post coronary angiogram    Dyspnea on exertion    Chest pain, unspecified type    Benign essential hypertension    Bladder cancer (H)    Unstable angina (H)    Hiatal hernia with GERD      Disposition: Home or Self Care  Condition at Discharge: Stable     Surgery: Robotic hiatal hernia repair with fundoplication on 10/18/24     Hospital Summary:   Bertrand Palacios was admitted for hiatal hernia with GERD and underwent an uncomplicated robotic hiatal hernia repair with fundoplication on 10/18/24. Following a brief recovery in the PACU, he was transferred to the Med/Surg floor for the remainder of his stay. His post operative course was uneventful, and his diet was advanced as tolerated. On POD # 1 he was discharged home tolerating a full liquid diet, ambulating without assistance, and pain controlled with oral analgesics.        Discharge Medications:      Medication List        Started      acetaminophen 500 MG tablet  Commonly known as: TYLENOL  500-1,000 mg, Oral, EVERY 6 HOURS PRN     senna-docusate 8.6-50 MG tablet  Commonly known as: SENOKOT-S/PERICOLACE  1 tablet, Oral, DAILY     traMADol 50 MG tablet  Commonly known as: ULTRAM  50 mg, Oral, EVERY 6 HOURS PRN          - OK to continue aspirin and can resume Plavix on Monday 10/21/24.     Discharge Instructions:  Follow up appointment with Primary Care Physician: Mayela Rayo  Follow up appointment with Dr. Mcintyre  in: 2-3 weeks    It is our practice to have all patients follow up with us 2-3 weeks after their surgery to ensure they are recovering well.  For straightforward laparoscopic procedures, this can be done either in clinic as a scheduled follow up appointment, or over  the phone.  If you would like a scheduled follow up appointment in clinic, please call us at [phone] to schedule an appointment at your convenience.  If you would prefer to follow up with us by phone please let us know so that we may contact you 2-3 weeks following your procedure.    Post operative instructions:   Diet: Full liquid diet. See diet handout provided.    Activity: You should continue to be active at home including ambulating frequently.  If possible try to limit the amount of time spent in bed.    Restrictions: You should avoid lifting anything more than 20 pounds or strenuous physical activity for a minimum of 6 weeks.  This is to help reduce the risk of hernia  formation at your port sites.  Walking does not count as strenuous physical activity.  You are safe to walk up and down stairs.  Following 2 weeks he may resume all normal physical activity.    Wound / drain care: You may remove your outer dressings after a period of 48 hours. The small white strips on the incisions act like artificial scabs, and will begin to peel at the edges at around 7-10 days. These can then be removed.    Bathing: You may shower after 48 hours from surgery. It is ok to get your incisions wet, but avoid rubbing them. Avoid soaking in bath tubs, or swimming in lakes, pools, or streams for 4 weeks following surgery.      Sarah Tony PA-C  [facility]  [address] [address]

## 2024-10-19 NOTE — PROGRESS NOTES
"General Surgery Progress Note:    Hospital Day # 1    ASSESSMENT:  No diagnosis found.    Bertrand Palacios is a 69 year old male who is s/p robotic hiatal hernia repair with fundoplication on 10/18/24 for with Dr. Mcintyre. Afebrile and no tachycardia. Pain adequately controlled and discussed transitioning to PO meds. Not passing much gas and no BMs. Continue with full liquid diet and will discharge on this diet. Plan to re-evaluate later today for possible discharge.     PLAN:  - Full liquid diet, will discharge on this diet, handout provided  - Encourage activity and ambulation to promote bowel function  - PO pain medications  - Wean O2 as able  - Possibly discharge later today or tomorrow pending PO pain control    SUBJECTIVE:   He is doing well today. Reports mild upper abdominal pain and feels bloated. Had cream of wheat for breakfast with no nausea or vomiting. Not passing much gas and no BMs. Ambulating in his room independently.     Patient Vitals for the past 24 hrs:   BP Temp Temp src Pulse Resp SpO2 Height Weight   10/19/24 0729 137/65 97.7  F (36.5  C) Oral 74 18 92 % -- --   10/19/24 0400 137/58 97.7  F (36.5  C) Oral 73 18 92 % -- --   10/19/24 0006 134/70 97.8  F (36.6  C) Oral 81 18 90 % -- --   10/18/24 2108 120/56 97.4  F (36.3  C) Oral 71 18 92 % -- --   10/18/24 2003 123/69 -- -- 78 20 92 % -- --   10/18/24 1854 (!) 140/65 (!) 96.3  F (35.7  C) Axillary 75 18 91 % -- --   10/18/24 1830 129/60 (!) 96.5  F (35.8  C) Axillary 76 18 92 % -- --   10/18/24 1800 118/59 97.8  F (36.6  C) Oral 77 18 92 % -- --   10/18/24 1758 -- -- -- -- -- -- 1.727 m (5' 8\") 101.7 kg (224 lb 3.3 oz)   10/18/24 1730 129/61 -- -- 72 15 95 % -- --   10/18/24 1715 132/63 97.5  F (36.4  C) Temporal 80 13 96 % -- --   10/18/24 1709 -- -- -- 74 10 96 % -- --   10/18/24 1701 138/63 97.8  F (36.6  C) -- 86 21 97 % -- --   10/18/24 1655 -- -- -- 87 15 97 % -- --   10/18/24 1649 (!) 156/66 -- -- 83 14 96 % -- --   10/18/24 1646 -- -- -- " 87 14 96 % -- --   10/18/24 1645 (!) 156/66 -- -- 87 14 96 % -- --   10/18/24 1639 (!) 141/90 97.7  F (36.5  C) -- 91 12 94 % -- --     PHYSICAL EXAM:  General: patient seen resting in bed, no acute distress  Resp: no respiratory distress, breathing comfortably on 2L O2  Abdomen: soft, non-distended, mild upper abdominal tenderness to palpitation. Incisions clean, dry, intact.  Extremities: warm and well perfused    10/18 0700 - 10/19 0659  In: 300 [I.V.:300]  Out: -     Admission on 10/18/2024   Component Date Value    Ventricular Rate 10/18/2024 85     Atrial Rate 10/18/2024 85     ME Interval 10/18/2024 184     QRS Duration 10/18/2024 88     QT 10/18/2024 380     QTc 10/18/2024 452     P Axis 10/18/2024 58     R AXIS 10/18/2024 -7     T Axis 10/18/2024 51     Interpretation ECG 10/18/2024                      Value:Sinus rhythm  Normal ECG  When compared with ECG of 24-Jan-2024 21:11,  No significant change was found      Creatinine 10/18/2024 1.07     GFR Estimate 10/18/2024 75     Hold Specimen 10/18/2024 JIC     Hold Specimen 10/18/2024 JIC     Hold Specimen 10/18/2024 JIC     GLUCOSE BY METER POCT 10/18/2024 174 (H)     Platelet Count 10/19/2024 170     Creatinine 10/19/2024 0.99     GFR Estimate 10/19/2024 82         Sarah Tony PA-C  [facility] Surgery  [address]  [place] [address]

## 2024-10-19 NOTE — PLAN OF CARE
Goal Outcome Evaluation:       Patient discharged home with daughter. Went over discharge paperwork with patient, belongings returned to patient at discharge.

## 2024-10-19 NOTE — PLAN OF CARE
Goal Outcome Evaluation:         Problem: Adult Inpatient Plan of Care  Goal: Plan of Care Review  Outcome: Progressing     Problem: Adult Inpatient Plan of Care  Goal: Optimal Comfort and Wellbeing  Outcome: Progressing  Intervention: Monitor Pain and Promote Comfort  Recent Flowsheet Documentation  Taken 10/19/2024 1200 by Chyna Villa RN  Pain Management Interventions:   declines   emotional support  Taken 10/19/2024 0901 by Chyna Villa RN  Pain Management Interventions:   medication (see MAR)   emotional support     Problem: Surgery Nonspecified  Goal: Effective Bowel Elimination  Outcome: Progressing     Problem: Surgery Nonspecified  Goal: Effective Oxygenation and Ventilation  Intervention: Optimize Oxygenation and Ventilation  Recent Flowsheet Documentation  Taken 10/19/2024 0901 by Chyna Villa RN  Head of Bed (HOB) Positioning: HOB at 20-30 degrees         Pt is alert and oriented X4, calm and cooperative, rating upper abdominal pain #5, Dilaudid given X1 with good pain relief, states pain decreased to #2.  Pt ambulating in room independently, gait steady.  Pt passing flatus and BM X1.  Lap sites X5 dry and intact.  Lung sounds diminished, able to titrate O2 off, sating 92% on room air.  IV SL, PO well, tolerating full liquid diet.  Pt hopeful to discharge home later today.    Chyna Villa, LADY

## 2024-10-19 NOTE — ANESTHESIA POSTPROCEDURE EVALUATION
Patient: Bertrand Palacios    Procedure: Procedure(s):  HIATAL HERNIA REPAIR WITH FUNDOPLICATION, PARTIAL POSTERIOR, ROBOT-ASSISTED, LAPAROSCOPIC, USING DA SARITHA XI       Anesthesia Type:  General    Note:  Disposition: Inpatient   Postop Pain Control: Uneventful            Sign Out: Well controlled pain   PONV: No   Neuro/Psych: Uneventful            Sign Out: Acceptable/Baseline neuro status   Airway/Respiratory: Uneventful            Sign Out: Acceptable/Baseline resp. status   CV/Hemodynamics: Uneventful            Sign Out: Acceptable CV status; No obvious hypovolemia; No obvious fluid overload   Other NRE:    DID A NON-ROUTINE EVENT OCCUR?        Last vitals:  Vitals Value Taken Time   /61 10/18/24 1730   Temp 36.4  C (97.5  F) 10/18/24 1715   Pulse 68 10/18/24 1734   Resp 10 10/18/24 1734   SpO2 96 % 10/18/24 1734   Vitals shown include unfiled device data.    Electronically Signed By: Radu Baldwin MD  October 18, 2024  10:06 PM

## 2024-10-19 NOTE — PLAN OF CARE
Problem: Adult Inpatient Plan of Care  Goal: Absence of Hospital-Acquired Illness or Injury  Intervention: Identify and Manage Fall Risk  Recent Flowsheet Documentation  Taken 10/18/2024 2346 by Earnest Palacios RN  Safety Promotion/Fall Prevention:   safety round/check completed   nonskid shoes/slippers when out of bed  Intervention: Prevent Skin Injury  Recent Flowsheet Documentation  Taken 10/18/2024 2346 by Earnest Palacios, RN  Body Position: position changed independently  Intervention: Prevent and Manage VTE (Venous Thromboembolism) Risk  Recent Flowsheet Documentation  Taken 10/18/2024 2346 by Earnest Palacios RN  VTE Prevention/Management: (Pt ambuating) SCDs off (sequential compression devices)  Goal: Optimal Comfort and Wellbeing  Outcome: Progressing     Problem: Adult Inpatient Plan of Care  Goal: Optimal Comfort and Wellbeing  Outcome: Progressing     Problem: Risk for Delirium  Goal: Improved Sleep  Outcome: Progressing   Goal Outcome Evaluation:    Patient alert and oriented x4, and pleasant. VSS on 3L via nasal canula with O2 sat more than 90% Mid epigastric pain managed with PRN Dilaudid x1 and Tramadol x1. Lap sites dressing clean, dry and intact. Dextrose 5% + 0.45 NaCl + KCL infusing at 50 ml/hr.

## 2024-10-23 NOTE — ADDENDUM NOTE
Addendum  created 10/23/24 0840 by Nithin Velazquez,     Attestation recorded in Intraprocedure, Intraprocedure Attestations filed

## 2024-11-01 ENCOUNTER — TELEPHONE (OUTPATIENT)
Dept: CARDIOLOGY | Facility: CLINIC | Age: 69
End: 2024-11-01
Payer: COMMERCIAL

## 2024-11-01 NOTE — TELEPHONE ENCOUNTER
Patient was read instructions as per EPIC notes that state progress diet as tolerated. Chaparrita KENNEY RN BSN, CHFN, PCCN-K

## 2024-11-01 NOTE — TELEPHONE ENCOUNTER
Health Call Center    Phone Message    May a detailed message be left on voicemail: yes     Reason for Call: Other: Bertrand called requesting to speak with his care team about some chest pain he has had that may be related to a procedure he had but wants to check with his care team to be safe. Please reach out to Bertrand to discuss. Thank you!     Action Taken: Other: Cardiology    Travel Screening: Not Applicable    Thank you!  [facility]       Date of Service:

## 2024-11-12 ENCOUNTER — OFFICE VISIT (OUTPATIENT)
Dept: SURGERY | Facility: CLINIC | Age: 69
End: 2024-11-12
Payer: COMMERCIAL

## 2024-11-12 DIAGNOSIS — Z98.890 POST-OPERATIVE STATE: Primary | ICD-10-CM

## 2024-11-12 PROCEDURE — 99024 POSTOP FOLLOW-UP VISIT: CPT | Performed by: NURSE PRACTITIONER

## 2024-11-12 NOTE — PROGRESS NOTES
HPI: Pt is here for follow up robot assisted paraesophageal hernia repair with partial posterior fundoplication with Dr. Mcintyre on 10/18/24.   he is doing well.  Pain is well controlled.  No difficulties with the surgical wound/wounds.  he is eating well and denies fever and chills.         There were no vitals taken for this visit.    EXAM:  GENERAL:Appears well  ABDOMEN:  Soft, +BS  SURGICAL WOUNDS:  Incisions healing well, no induration or drainage.      Assessment/Plan: Doing well after surgery. He can advance to a regular diet. He should follow up as needed.    CHARLES Zazueta [facility] [place] , [facility]

## 2024-11-12 NOTE — LETTER
11/12/2024      Bertrand Palacios  [address]  [place] [address]      Dear Colleague,    Thank you for referring your patient, Bertrand Palacios, to the [facility] [place]. Please see a copy of my visit note below.    HPI: Pt is here for follow up robot assisted paraesophageal hernia repair with partial posterior fundoplication with Dr. Mcintyre on 10/18/24.   he is doing well.  Pain is well controlled.  No difficulties with the surgical wound/wounds.  he is eating well and denies fever and chills.         There were no vitals taken for this visit.    EXAM:  GENERAL:Appears well  ABDOMEN:  Soft, +BS  SURGICAL WOUNDS:  Incisions healing well, no induration or drainage.      Assessment/Plan: Doing well after surgery. He can advance to a regular diet. He should follow up as needed.    CHARLES Zazueta CNP , [facility]        Again, thank you for allowing me to participate in the care of your patient.        Sincerely,        CHARLES Zazueta CNP

## 2024-11-16 ENCOUNTER — HOSPITAL ENCOUNTER (EMERGENCY)
Facility: HOSPITAL | Age: 69
Discharge: HOME | End: 2024-11-16
Attending: EMERGENCY MEDICINE | Admitting: EMERGENCY MEDICINE
Payer: COMMERCIAL

## 2024-11-16 ENCOUNTER — APPOINTMENT (OUTPATIENT)
Dept: CT IMAGING | Facility: HOSPITAL | Age: 69
End: 2024-11-16
Attending: EMERGENCY MEDICINE
Payer: COMMERCIAL

## 2024-11-16 VITALS
HEIGHT: 68 IN | TEMPERATURE: 97.7 F | OXYGEN SATURATION: 94 % | HEART RATE: 103 BPM | SYSTOLIC BLOOD PRESSURE: 120 MMHG | DIASTOLIC BLOOD PRESSURE: 69 MMHG | BODY MASS INDEX: 33.34 KG/M2 | WEIGHT: 220 LBS | RESPIRATION RATE: 25 BRPM

## 2024-11-16 DIAGNOSIS — N20.1 RIGHT URETERAL STONE: ICD-10-CM

## 2024-11-16 DIAGNOSIS — N23 RENAL COLIC: ICD-10-CM

## 2024-11-16 LAB
ALBUMIN SERPL BCG-MCNC: 3.9 G/DL (ref 3.5–5.2)
ALBUMIN UR-MCNC: 30 MG/DL
ALP SERPL-CCNC: 104 U/L (ref 40–150)
ALT SERPL W P-5'-P-CCNC: 42 U/L (ref 0–70)
ANION GAP SERPL CALCULATED.3IONS-SCNC: 13 MMOL/L (ref 7–15)
APPEARANCE UR: ABNORMAL
AST SERPL W P-5'-P-CCNC: 30 U/L (ref 0–45)
BASOPHILS # BLD AUTO: 0 10E3/UL (ref 0–0.2)
BASOPHILS NFR BLD AUTO: 0 %
BILIRUB DIRECT SERPL-MCNC: <0.2 MG/DL (ref 0–0.3)
BILIRUB SERPL-MCNC: 0.4 MG/DL
BILIRUB UR QL STRIP: NEGATIVE
BUN SERPL-MCNC: 15.3 MG/DL (ref 8–23)
CALCIUM SERPL-MCNC: 8.8 MG/DL (ref 8.8–10.4)
CHLORIDE SERPL-SCNC: 108 MMOL/L (ref 98–107)
COLOR UR AUTO: ABNORMAL
CREAT SERPL-MCNC: 1.03 MG/DL (ref 0.67–1.17)
CRP SERPL-MCNC: <3 MG/L
D DIMER PPP FEU-MCNC: 0.56 UG/ML FEU (ref 0–0.5)
EGFRCR SERPLBLD CKD-EPI 2021: 78 ML/MIN/1.73M2
EOSINOPHIL # BLD AUTO: 0.1 10E3/UL (ref 0–0.7)
EOSINOPHIL NFR BLD AUTO: 1 %
ERYTHROCYTE [DISTWIDTH] IN BLOOD BY AUTOMATED COUNT: 13.7 % (ref 10–15)
GLUCOSE SERPL-MCNC: 183 MG/DL (ref 70–99)
GLUCOSE UR STRIP-MCNC: 50 MG/DL
HCO3 SERPL-SCNC: 22 MMOL/L (ref 22–29)
HCT VFR BLD AUTO: 45.6 % (ref 40–53)
HGB BLD-MCNC: 15.4 G/DL (ref 13.3–17.7)
HGB UR QL STRIP: ABNORMAL
IMM GRANULOCYTES # BLD: 0 10E3/UL
IMM GRANULOCYTES NFR BLD: 0 %
KETONES UR STRIP-MCNC: 20 MG/DL
LEUKOCYTE ESTERASE UR QL STRIP: NEGATIVE
LIPASE SERPL-CCNC: 20 U/L (ref 13–60)
LYMPHOCYTES # BLD AUTO: 1.6 10E3/UL (ref 0.8–5.3)
LYMPHOCYTES NFR BLD AUTO: 15 %
MCH RBC QN AUTO: 29.1 PG (ref 26.5–33)
MCHC RBC AUTO-ENTMCNC: 33.8 G/DL (ref 31.5–36.5)
MCV RBC AUTO: 86 FL (ref 78–100)
MONOCYTES # BLD AUTO: 0.6 10E3/UL (ref 0–1.3)
MONOCYTES NFR BLD AUTO: 5 %
MUCOUS THREADS #/AREA URNS LPF: PRESENT /LPF
NEUTROPHILS # BLD AUTO: 8.4 10E3/UL (ref 1.6–8.3)
NEUTROPHILS NFR BLD AUTO: 78 %
NITRATE UR QL: NEGATIVE
NRBC # BLD AUTO: 0 10E3/UL
NRBC BLD AUTO-RTO: 0 /100
PH UR STRIP: 5.5 [PH] (ref 5–7)
PLATELET # BLD AUTO: 157 10E3/UL (ref 150–450)
POTASSIUM SERPL-SCNC: 3.5 MMOL/L (ref 3.4–5.3)
PROT SERPL-MCNC: 6.9 G/DL (ref 6.4–8.3)
RBC # BLD AUTO: 5.3 10E6/UL (ref 4.4–5.9)
RBC URINE: >182 /HPF
SODIUM SERPL-SCNC: 143 MMOL/L (ref 135–145)
SP GR UR STRIP: <=1 (ref 1–1.03)
TROPONIN T SERPL HS-MCNC: 10 NG/L
TROPONIN T SERPL HS-MCNC: 13 NG/L
UROBILINOGEN UR STRIP-MCNC: <2 MG/DL
WBC # BLD AUTO: 10.7 10E3/UL (ref 4–11)
WBC URINE: 19 /HPF

## 2024-11-16 PROCEDURE — 96375 TX/PRO/DX INJ NEW DRUG ADDON: CPT

## 2024-11-16 PROCEDURE — 80053 COMPREHEN METABOLIC PANEL: CPT | Performed by: EMERGENCY MEDICINE

## 2024-11-16 PROCEDURE — 86140 C-REACTIVE PROTEIN: CPT | Performed by: EMERGENCY MEDICINE

## 2024-11-16 PROCEDURE — 99285 EMERGENCY DEPT VISIT HI MDM: CPT | Mod: 25

## 2024-11-16 PROCEDURE — 87086 URINE CULTURE/COLONY COUNT: CPT | Performed by: EMERGENCY MEDICINE

## 2024-11-16 PROCEDURE — 85004 AUTOMATED DIFF WBC COUNT: CPT | Performed by: EMERGENCY MEDICINE

## 2024-11-16 PROCEDURE — 85018 HEMOGLOBIN: CPT | Performed by: EMERGENCY MEDICINE

## 2024-11-16 PROCEDURE — 93005 ELECTROCARDIOGRAM TRACING: CPT | Performed by: EMERGENCY MEDICINE

## 2024-11-16 PROCEDURE — 250N000011 HC RX IP 250 OP 636: Performed by: EMERGENCY MEDICINE

## 2024-11-16 PROCEDURE — 82248 BILIRUBIN DIRECT: CPT | Performed by: EMERGENCY MEDICINE

## 2024-11-16 PROCEDURE — 81001 URINALYSIS AUTO W/SCOPE: CPT | Performed by: EMERGENCY MEDICINE

## 2024-11-16 PROCEDURE — 74177 CT ABD & PELVIS W/CONTRAST: CPT

## 2024-11-16 PROCEDURE — 36415 COLL VENOUS BLD VENIPUNCTURE: CPT | Performed by: EMERGENCY MEDICINE

## 2024-11-16 PROCEDURE — 85379 FIBRIN DEGRADATION QUANT: CPT | Performed by: EMERGENCY MEDICINE

## 2024-11-16 PROCEDURE — 84484 ASSAY OF TROPONIN QUANT: CPT | Performed by: EMERGENCY MEDICINE

## 2024-11-16 PROCEDURE — 83690 ASSAY OF LIPASE: CPT | Performed by: EMERGENCY MEDICINE

## 2024-11-16 PROCEDURE — 96374 THER/PROPH/DIAG INJ IV PUSH: CPT | Mod: 59

## 2024-11-16 RX ORDER — ONDANSETRON 2 MG/ML
4 INJECTION INTRAMUSCULAR; INTRAVENOUS ONCE
Status: COMPLETED | OUTPATIENT
Start: 2024-11-16 | End: 2024-11-16

## 2024-11-16 RX ORDER — METOCLOPRAMIDE HYDROCHLORIDE 5 MG/ML
10 INJECTION INTRAMUSCULAR; INTRAVENOUS ONCE
Status: COMPLETED | OUTPATIENT
Start: 2024-11-16 | End: 2024-11-16

## 2024-11-16 RX ORDER — IOPAMIDOL 755 MG/ML
90 INJECTION, SOLUTION INTRAVASCULAR ONCE
Status: COMPLETED | OUTPATIENT
Start: 2024-11-16 | End: 2024-11-16

## 2024-11-16 RX ORDER — MORPHINE SULFATE 4 MG/ML
4 INJECTION, SOLUTION INTRAMUSCULAR; INTRAVENOUS ONCE
Status: COMPLETED | OUTPATIENT
Start: 2024-11-16 | End: 2024-11-16

## 2024-11-16 RX ORDER — ACETAMINOPHEN 500 MG
1000 TABLET ORAL EVERY 6 HOURS
Qty: 56 TABLET | Refills: 0 | Status: SHIPPED | OUTPATIENT
Start: 2024-11-16 | End: 2024-11-23

## 2024-11-16 RX ORDER — MORPHINE SULFATE 4 MG/ML
4 INJECTION, SOLUTION INTRAMUSCULAR; INTRAVENOUS ONCE
Status: DISCONTINUED | OUTPATIENT
Start: 2024-11-16 | End: 2024-11-16 | Stop reason: HOSPADM

## 2024-11-16 RX ORDER — DIMENHYDRINATE 50 MG
50 TABLET ORAL AT BEDTIME
Qty: 7 TABLET | Refills: 0 | Status: SHIPPED | OUTPATIENT
Start: 2024-11-16 | End: 2024-11-23

## 2024-11-16 RX ORDER — KETOROLAC TROMETHAMINE 15 MG/ML
15 INJECTION, SOLUTION INTRAMUSCULAR; INTRAVENOUS ONCE
Status: COMPLETED | OUTPATIENT
Start: 2024-11-16 | End: 2024-11-16

## 2024-11-16 RX ORDER — OXYCODONE HYDROCHLORIDE 5 MG/1
5 TABLET ORAL EVERY 6 HOURS PRN
Qty: 12 TABLET | Refills: 0 | Status: SHIPPED | OUTPATIENT
Start: 2024-11-16 | End: 2024-11-19

## 2024-11-16 RX ORDER — DIMENHYDRINATE 50 MG
50 TABLET ORAL EVERY 6 HOURS PRN
Qty: 28 TABLET | Refills: 0 | Status: SHIPPED | OUTPATIENT
Start: 2024-11-16 | End: 2024-11-23

## 2024-11-16 RX ORDER — DOCUSATE SODIUM 100 MG/1
100 CAPSULE, LIQUID FILLED ORAL 2 TIMES DAILY
Qty: 20 CAPSULE | Refills: 0 | Status: SHIPPED | OUTPATIENT
Start: 2024-11-16 | End: 2024-11-26

## 2024-11-16 RX ORDER — ONDANSETRON 4 MG/1
4 TABLET, ORALLY DISINTEGRATING ORAL EVERY 8 HOURS PRN
Qty: 10 TABLET | Refills: 0 | Status: SHIPPED | OUTPATIENT
Start: 2024-11-16 | End: 2024-11-19

## 2024-11-16 RX ADMIN — MORPHINE SULFATE 4 MG: 4 INJECTION, SOLUTION INTRAMUSCULAR; INTRAVENOUS at 06:53

## 2024-11-16 RX ADMIN — IOPAMIDOL 90 ML: 755 INJECTION, SOLUTION INTRAVENOUS at 08:12

## 2024-11-16 RX ADMIN — ONDANSETRON 4 MG: 2 INJECTION INTRAMUSCULAR; INTRAVENOUS at 06:54

## 2024-11-16 RX ADMIN — KETOROLAC TROMETHAMINE 15 MG: 15 INJECTION, SOLUTION INTRAMUSCULAR; INTRAVENOUS at 07:39

## 2024-11-16 RX ADMIN — METOCLOPRAMIDE 10 MG: 5 INJECTION, SOLUTION INTRAMUSCULAR; INTRAVENOUS at 07:45

## 2024-11-16 ASSESSMENT — ACTIVITIES OF DAILY LIVING (ADL)
ADLS_ACUITY_SCORE: 0

## 2024-11-16 ASSESSMENT — COLUMBIA-SUICIDE SEVERITY RATING SCALE - C-SSRS
1. IN THE PAST MONTH, HAVE YOU WISHED YOU WERE DEAD OR WISHED YOU COULD GO TO SLEEP AND NOT WAKE UP?: NO
6. HAVE YOU EVER DONE ANYTHING, STARTED TO DO ANYTHING, OR PREPARED TO DO ANYTHING TO END YOUR LIFE?: NO
2. HAVE YOU ACTUALLY HAD ANY THOUGHTS OF KILLING YOURSELF IN THE PAST MONTH?: NO

## 2024-11-16 NOTE — ED NOTES
Patient remains pain free. Ready for discharge. Strainer, given. Instructed to save stone if obtained.

## 2024-11-16 NOTE — DISCHARGE INSTRUCTIONS
Read and follow the discharge instructions.    Take lots of liquids stay well-hydrated.    Take the medications as instructed.    The narcotic medication will give you constipation make sure you use a stool softener.    I am referring you to the kidney stone [facility] that should be giving you a call next week.    Return for inability to urinate fever or pain is not controlled or any other concerns.

## 2024-11-16 NOTE — ED NOTES
Bed: JNED-24  Expected date: 11/16/24  Expected time: 6:04 AM  Means of arrival: Ambulance  Comments:  SPF  68 yo M   Flank pain hx stones

## 2024-11-16 NOTE — ED NOTES
Patient requesting analgesic, morphine given earlier had poor results. Sitting upright in bed, rocking, pain now lower right flank. MD updated. Toradol given with reglan, pain decreasing within 10 mins. Nausea improved. Able to lie back in bed, awaiting CT

## 2024-11-16 NOTE — ED TRIAGE NOTES
Complaint is right flank pain since about 0400 while standing to urinate. HX of kidney stones. A&O, VSS, ambulating.   Triage Assessment (Adult)       Row Name 11/16/24 0620          Triage Assessment    Airway WDL WDL        Respiratory WDL    Respiratory WDL WDL        Skin Circulation/Temperature WDL    Skin Circulation/Temperature WDL WDL        Cardiac WDL    Cardiac WDL WDL        Peripheral/Neurovascular WDL    Peripheral Neurovascular WDL WDL        Cognitive/Neuro/Behavioral WDL    Cognitive/Neuro/Behavioral WDL WDL

## 2024-11-16 NOTE — ED PROVIDER NOTES
EMERGENCY DEPARTMENT ENCOUNTER      NAME: Bertrand Palacios  AGE: 69 year old male  YOB: 1955  MRN: [id number]  EVALUATION DATE & TIME: No admission date for patient encounter.    PCP: Mayela Rayo    ED PROVIDER: Susu Barton M.D.      CHIEF COMPLAINT     Chief Complaint   Patient presents with    Right flank pain         FINAL IMPRESSION:     1. Renal colic    2. Right ureteral stone          MEDICAL DECISION MAKING:     ED Course as of 11/16/24 1514   Sat Nov 16, 2024   0628 69-year-old male presents via EMS for sudden onset right flank pain radiating to the inguinal area.  Happened as he was trying to urinate.   0629 Pain initially was higher.  Associated with chills.  Nausea.   0629 Did not see any obvious hematuria.   0629 History of coronary disease status post multiple stents and reason hiatal hernia repair.   0629 States went well no longer taking pain medications.   0629 On examination he is nontoxic appears in pain cooperative and pleasant.  Cardiopulmonary no murmurs.  Pain is located in the right flank right inguinal area with not reversibility.  No pulsatile masses 2+ femoral pulses normal  exam   0629 Differential diagnosis for sudden onset of right flank pain include but not limited to dissection aneurysm kidney stone reports pain higher up and feeling short of breath PE given recent surgery.  Testicular torsion   0630 Review external records surgery on 10/18 has had follow-up has been doing well coronary disease bladder cancer cholecystectomy.   0630 IV established will give patient nausea medications pain medication and image chest and abdomen.   0633 Vitals Hypertensive afebrile satting 92% on room air.   0637 EKG interpreted by me reveals sinus rhythm normal anterior progression normal axis low voltage   0710 Repeat Pulse ox 95%.   0734 Normal white blood cell count and hemoglobin.  Normal potassium.  Normal liver function test.   0735 DDimer is 56.  Age-adjusted is within normal  limits.   0840 2 mm Proximal right ureteral stone.  Cholecystectomy.  Coronary disease.   0846 Reevaluated states he feels much better..  Still feels much better.  Felt like the stone moved.  Housemate at bedside.   0950 Troponin 10 repeat 13 no chest pain no shortness of breath the pain instructed in the abdominal inguinal area from the stone passes.  Low suspicion for BYRON   0957 CT Abdomen pelvis independently interpreted by me reveals no free air.  Formal read by radiologist.   1010 UA  no Nitrates to the leukocyte esterase turbid urine.   1037 Patient states he feels much better feels comfortable being discharged will refer him to the kidney stone [place].       Medical Decision Making    History:  Supplemental history from: EMS  External Record(s) reviewed:  Surgery 11/12/2024 paraesophageal hernia repair      Work Up:  Chart documentation includes differential considered and any EKGs or imaging independently interpreted by provider, where specified.  In additional to work up documented, I considered the following work up: ct chest normal age adjusted ddimer denies cp or sob once flank pain improved    External consultation:  Discussion of management with another provider:     Complicating factors:  Care impacted by chronic illness: hiatal hernia with GERD, dyslipidemia, CAD, bladder cancer, HTN      Disposition considerations: Discharge. I prescribed additional prescription strength medication(s) as charted. I considered admission, but discharged the patient after share decision making conversation.    Not Applicable        ED COURSE     6:22 AM Introduced myself to the patient, obtained history of present illness, and performed initial physical exam at this time.    8:41 AM updated.  10:35 AM I rechecked the patient and discussed results, discharge, follow up, and reasons to return to the ED. We discussed the plan for discharge and the patient is agreeable. Reviewed supportive cares, symptomatic treatment,  outpatient follow up, and reasons to return to the Emergency Department. Patient to be discharged by ED RN.       At the conclusion of the encounter I discussed the results of all of the tests and the disposition. The questions were answered. The patient and acknowledged understanding and was agreeable with the care plan.         MEDICATIONS GIVEN IN THE EMERGENCY:     Medications   ondansetron (ZOFRAN) injection 4 mg (4 mg Intravenous $Given 11/16/24 0654)   morphine (PF) injection 4 mg (4 mg Intravenous $Given 11/16/24 0653)   ketorolac (TORADOL) injection 15 mg (15 mg Intravenous $Given 11/16/24 0739)   metoclopramide (REGLAN) injection 10 mg (10 mg Intravenous $Given 11/16/24 0745)   iopamidol (ISOVUE-370) solution 90 mL (90 mLs Intravenous $Given 11/16/24 0812)       NEW PRESCRIPTIONS STARTED AT TODAY'S ER VISIT     Discharge Medication List as of 11/16/2024 10:56 AM        START taking these medications    Details   !! dimenhyDRINATE (DRAMAMINE) 50 MG tablet Take 1 tablet (50 mg) by mouth at bedtime for 7 days., Disp-7 tablet, R-0, E-Prescribe      !! dimenhyDRINATE (DRAMAMINE) 50 MG tablet Take 1 tablet (50 mg) by mouth every 6 hours as needed for other (kidney stone pain management)., Disp-28 tablet, R-0, E-Prescribe      docusate sodium (COLACE) 100 MG capsule Take 1 capsule (100 mg) by mouth 2 times daily for 10 days., Disp-20 capsule, R-0, E-Prescribe      ondansetron (ZOFRAN ODT) 4 MG ODT tab Take 1 tablet (4 mg) by mouth every 8 hours as needed., Disp-10 tablet, R-0, E-Prescribe      oxyCODONE (ROXICODONE) 5 MG tablet Take 1 tablet (5 mg) by mouth every 6 hours as needed for pain., Disp-12 tablet, R-0, E-Prescribe       !! - Potential duplicate medications found. Please discuss with provider.             =================================================================    HPI     Patient information was obtained from: the patient, EMS    Use of : N/A         Bertrand Moyernett is a 69 year old  male who presents by EMS.    Per EMS, patient felt sudden onset right flank pain while urinating around 4 AM today. He has a history of kidney stones.    Per the patient, he woke up this morning at 3:45 AM to urinate and felt sudden onset right mid back pain that traveled down to the lower back and into the right groin. He endorses nausea, vomiting, right leg numbness, chills. He denies blood in the urine, testicle pain.     To note, he had a recent hernia surgery (10/18/24). He is off his pain medication now.      REVIEW OF SYSTEMS   Review of Systems  SEE HPI    PAST MEDICAL HISTORY:     Past Medical History:   Diagnosis Date    Cancer (H)     bladder    Coronary artery disease     S/P PCI in 2006 Per H&P    Hiatal hernia     Per H&P    History of ASCVD     per H&P    HLD (hyperlipidemia)     Hypertension     Stented coronary artery        PAST SURGICAL HISTORY:     Past Surgical History:   Procedure Laterality Date    BLADDER SURGERY  01/01/2008    tumor     CARDIAC CATHETERIZATION      2006 Per H&P    CHOLECYSTECTOMY      CV CORONARY ANGIOGRAM N/A 03/06/2020    Procedure: Coronary Angiogram;  Surgeon: Tony Juárez MD;  Location: [facility] Cath Lab;  Service: Cardiology    CV CORONARY ANGIOGRAM N/A 01/25/2024    Procedure: Coronary Angiogram;  Surgeon: Christopher Hogan MD;  Location: [facility] LAB CV    CV HEART CATHETERIZATION WITH POSSIBLE INTERVENTION N/A 07/15/2021    Procedure: Coronary Angiogram;  Surgeon: Tony Juárez MD;  Location: [facility] LAB CV    CV LEFT HEART CATH N/A 01/25/2024    Procedure: Left Heart Catheterization;  Surgeon: Christopher Hogan MD;  Location: [facility] LAB CV    CV PCI N/A 07/15/2021    Procedure: PERCUTANEOUS CORONARY INTERVENTION;  Surgeon: Tony Juárez MD;  Location: [facility] LAB CV    GENITOURINARY SURGERY      IR MISCELLANEOUS PROCEDURE  11/09/2006    LAPAROSCOPIC NISSEN FUNDOPLICATION N/A 10/18/2024    Procedure: HIATAL HERNIA REPAIR  WITH FUNDOPLICATION, PARTIAL POSTERIOR, ROBOT-ASSISTED, LAPAROSCOPIC, USING DA SARITHA XI;  Surgeon: Emile Mcintyre MD;  Location: [facility] Main OR         CURRENT MEDICATIONS:   acetaminophen (TYLENOL) 500 MG tablet  dimenhyDRINATE (DRAMAMINE) 50 MG tablet  dimenhyDRINATE (DRAMAMINE) 50 MG tablet  docusate sodium (COLACE) 100 MG capsule  ondansetron (ZOFRAN ODT) 4 MG ODT tab  oxyCODONE (ROXICODONE) 5 MG tablet  aspirin 81 MG EC tablet  B Complex Vitamins (B COMPLEX PO)  Cholecalciferol (VITAMIN D3 PO)  clopidogrel (PLAVIX) 75 MG tablet  Cyanocobalamin 2000 MCG TBCR  latanoprost (XALATAN) 0.005 % ophthalmic solution  lisinopril (PRINIVIL,ZESTRIL) 2.5 MG tablet  metoprolol succinate (TOPROL-XL) 50 MG 24 hr tablet  multivitamin w/minerals (MULTI-VITAMIN) tablet  nitroGLYcerin (NITROSTAT) 0.4 MG sublingual tablet  omeprazole (PRILOSEC) 20 MG capsule  rosuvastatin (CRESTOR) 40 MG tablet  senna-docusate (SENOKOT-S/PERICOLACE) 8.6-50 MG tablet         ALLERGIES:     Allergies   Allergen Reactions    Ticagrelor Shortness Of Breath    Isosorbide Headache       FAMILY HISTORY:     Family History   Problem Relation Age of Onset    Cerebrovascular Disease Mother     Heart Disease Mother     Cancer Father     No Known Problems Sister     Other - See Comments Brother 77.00    No Known Problems Brother     No Known Problems Sister        SOCIAL HISTORY:     Social History     Socioeconomic History    Marital status:    Tobacco Use    Smoking status: Former     Current packs/day: 0.00     Types: Cigarettes     Quit date: 2008     Years since quittin.0    Smokeless tobacco: Never   Substance and Sexual Activity    Alcohol use: Never    Drug use: Not Currently     Types: Marijuana     Comment: none for the past 35 years    Sexual activity: Not Currently     Social Drivers of Health     Financial Resource Strain: Low Risk  (10/19/2024)    Financial Resource Strain     Within the past 12 months, have you or your  "family members you live with been unable to get utilities (heat, electricity) when it was really needed?: No   Food Insecurity: Low Risk  (10/19/2024)    Food Insecurity     Within the past 12 months, did you worry that your food would run out before you got money to buy more?: No     Within the past 12 months, did the food you bought just not last and you didn t have money to get more?: No   Transportation Needs: Low Risk  (10/19/2024)    Transportation Needs     Within the past 12 months, has lack of transportation kept you from medical appointments, getting your medicines, non-medical meetings or appointments, work, or from getting things that you need?: No   Social Connections: Socially Integrated (9/24/2024)    [facility] from [facility]    Social Connections     Do you often feel lonely or isolated from those around you?: 0   Interpersonal Safety: Low Risk  (10/18/2024)    Interpersonal Safety     Do you feel physically and emotionally safe where you currently live?: Yes     Within the past 12 months, have you been hit, slapped, kicked or otherwise physically hurt by someone?: No     Within the past 12 months, have you been humiliated or emotionally abused in other ways by your partner or ex-partner?: No   Housing Stability: Low Risk  (10/19/2024)    Housing Stability     Do you have housing? : Yes     Are you worried about losing your housing?: No       VITALS:   /69   Pulse 103   Temp 97.7  F (36.5  C) (Oral)   Resp 25   Ht 1.727 m (5' 8\")   Wt 99.8 kg (220 lb)   SpO2 94%   BMI 33.45 kg/m      PHYSICAL EXAM     Physical Exam  Vitals and nursing note reviewed. Exam conducted with a chaperone present.   Constitutional:       General: He is not in acute distress.     Appearance: He is not ill-appearing, toxic-appearing or diaphoretic.   Neurological:      Mental Status: He is alert.         Physical Exam   Constitutional: Appears uncomfortable    Head: Atraumatic. "     Nose: Nose normal.     Mouth/Throat: Oropharynx is clear and moist.     Eyes: EOM are normal. Pupils are equal, round, and reactive to light.     Ears: Bilateral pearly white tympanic membranes.    Neck: Normal range of motion. Neck supple.     Cardiovascular: Normal rate, regular rhythm and normal heart sounds.  2+ femoral pulses/radial/DP pulses B    Pulmonary/Chest: Normal effort  and breath sounds normal.     Abdominal: soft, surgical scar.    Musculoskeletal: Normal range of motion. Right lower back pain    Neurological: Moves upper and lower extremities equally.    Lymphatics: no edema, no calves pain, no palpable cords.    : Normal male anatomy    Skin: Skin is warm and dry.     Psychiatric: Normal mood and affect. Behavior is normal.       LAB:     All pertinent labs reviewed and interpreted.  Labs Ordered and Resulted from Time of ED Arrival to Time of ED Departure   BASIC METABOLIC PANEL - Abnormal       Result Value    Sodium 143      Potassium 3.5      Chloride 108 (*)     Carbon Dioxide (CO2) 22      Anion Gap 13      Urea Nitrogen 15.3      Creatinine 1.03      GFR Estimate 78      Calcium 8.8      Glucose 183 (*)    ROUTINE UA WITH MICROSCOPIC REFLEX TO CULTURE - Abnormal    Color Urine Orange (*)     Appearance Urine Turbid (*)     Glucose Urine 50 (*)     Bilirubin Urine Negative      Ketones Urine 20 (*)     Specific Gravity Urine <=1.000 (*)     Blood Urine >1.0 mg/dL (*)     pH Urine 5.5      Protein Albumin Urine 30 (*)     Urobilinogen Urine <2.0      Nitrite Urine Negative      Leukocyte Esterase Urine Negative      Mucus Urine Present (*)     RBC Urine >182 (*)     WBC Urine 19 (*)    CBC WITH PLATELETS AND DIFFERENTIAL - Abnormal    WBC Count 10.7      RBC Count 5.30      Hemoglobin 15.4      Hematocrit 45.6      MCV 86      MCH 29.1      MCHC 33.8      RDW 13.7      Platelet Count 157      % Neutrophils 78      % Lymphocytes 15      % Monocytes 5      % Eosinophils 1      % Basophils  0      % Immature Granulocytes 0      NRBCs per 100 WBC 0      Absolute Neutrophils 8.4 (*)     Absolute Lymphocytes 1.6      Absolute Monocytes 0.6      Absolute Eosinophils 0.1      Absolute Basophils 0.0      Absolute Immature Granulocytes 0.0      Absolute NRBCs 0.0     D DIMER QUANTITATIVE - Abnormal    D-Dimer Quantitative 0.56 (*)    HEPATIC FUNCTION PANEL - Normal    Protein Total 6.9      Albumin 3.9      Bilirubin Total 0.4      Alkaline Phosphatase 104      AST 30      ALT 42      Bilirubin Direct <0.20     LIPASE - Normal    Lipase 20     TROPONIN T, HIGH SENSITIVITY - Normal    Troponin T, High Sensitivity 10     CRP INFLAMMATION - Normal    CRP Inflammation <3.00     TROPONIN T, HIGH SENSITIVITY - Normal    Troponin T, High Sensitivity 13     URINE CULTURE        RADIOLOGY:     Reviewed all pertinent imaging. Please see official radiology report.  CT Abdomen Pelvis w Contrast   Final Result   IMPRESSION:    1.  2 mm right proximal ureteral stone resulting in mild right-sided hydroureter.   2.  Atherosclerotic vascular disease.   3.  Status post cholecystectomy.              EKG:     EKG #1  Sinus rhythm normal anterior progression normal axis    Time:[id number]    Ventricular rate 79 bmp  Axis normal  NV interval 198 ms  QRS duration 88 ms  QT//440 ms    Compared to previous EKG on October 18, 2024 sinus rhythm normal anterior progression low voltage seen before and only lead II and aVF.  I have independently reviewed and interpreted the EKG(s) documented above.      PROCEDURES:     Procedures      I, Geronimo Plaza, am serving as a scribe to document services personally performed by Dr. Barton based on my observation and the provider's statements to me. I, Susu Barton MD attest that Geronimo Plaza is acting in a scribe capacity, has observed my performance of the services and has documented them in accordance with my direction.    Susu Barton M.D.  Emergency Medicine  [facility]  [facility] EMERGENCY DEPARTMENT  [address] [place] [id number]  [phone]  Dept: [phone]       Susu aBrton MD  11/16/24 4777

## 2024-11-17 LAB — BACTERIA UR CULT: NORMAL

## 2024-11-22 NOTE — ANESTHESIA CARE TRANSFER NOTE
Patient: Bertrand Palacios    Procedure: Procedure(s):  HIATAL HERNIA REPAIR WITH FUNDOPLICATION, PARTIAL POSTERIOR, ROBOT-ASSISTED, LAPAROSCOPIC, USING DA SARITHA XI       Diagnosis: Hiatal hernia [K44.9]  Diagnosis Additional Information: No value filed.    Anesthesia Type:   General     Note:    Oropharynx: oropharynx clear of all foreign objects  Level of Consciousness: awake  Oxygen Supplementation: nasal cannula  Level of Supplemental Oxygen (L/min / FiO2): 6  Independent Airway: airway patency satisfactory and stable  Dentition: dentition unchanged  Vital Signs Stable: post-procedure vital signs reviewed and stable  Report to RN Given: handoff report given  Patient transferred to: PACU  Comments: Pt c/o of unspecified chest pain. No noticeable ECG changes on the monitor. 12 lead EKG ordered.  Handoff Report: Identifed the Patient, Identified the Reponsible Provider, Reviewed the pertinent medical history, Discussed the surgical course, Reviewed Intra-OP anesthesia mangement and issues during anesthesia, Set expectations for post-procedure period and Allowed opportunity for questions and acknowledgement of understanding      Vitals:  Vitals Value Taken Time   /90 10/18/24 1633   Temp 36.4  C (97.52  F) 10/18/24 1638   Pulse 92 10/18/24 1638   Resp 13 10/18/24 1638   SpO2 93 % 10/18/24 1638   Vitals shown include unfiled device data.    Electronically Signed By: CHARLES Cain CRNA  October 18, 2024  4:39 PM   Opt out no

## 2024-11-27 LAB
ATRIAL RATE - MUSE: 99 BPM
DIASTOLIC BLOOD PRESSURE - MUSE: 67 MMHG
INTERPRETATION ECG - MUSE: NORMAL
P AXIS - MUSE: 57 DEGREES
PR INTERVAL - MUSE: 182 MS
QRS DURATION - MUSE: 90 MS
QT - MUSE: 354 MS
QTC - MUSE: 454 MS
R AXIS - MUSE: 12 DEGREES
SYSTOLIC BLOOD PRESSURE - MUSE: 147 MMHG
T AXIS - MUSE: 21 DEGREES
VENTRICULAR RATE- MUSE: 99 BPM

## 2024-12-05 ENCOUNTER — VIRTUAL VISIT (OUTPATIENT)
Dept: UROLOGY | Facility: CLINIC | Age: 69
End: 2024-12-05
Payer: COMMERCIAL

## 2024-12-05 DIAGNOSIS — N23 RENAL COLIC: ICD-10-CM

## 2024-12-05 DIAGNOSIS — N20.1 RIGHT URETERAL STONE: ICD-10-CM

## 2024-12-05 ASSESSMENT — PAIN SCALES - GENERAL: PAINLEVEL_OUTOF10: NO PAIN (0)

## 2024-12-05 NOTE — NURSING NOTE
Is the patient currently in the state of [place]? YES    Visit mode:VIDEO    If the visit is dropped, the patient can be reconnected by: VIDEO VISIT: Send to e-mail at: [email or web address]    Will anyone else be joining the visit? NO  (If patient encounters technical issues they should call [phone]    How would you like to obtain your AVS? MyChart    Are changes needed to the allergy or medication list? No    Are refills needed on medications prescribed by this physician? NO    Reason for visit: Consult    Tiffanie HERBERT

## 2024-12-05 NOTE — PATIENT INSTRUCTIONS
"UROLOGY CLINIC VISIT PATIENT INSTRUCTIONS    -If having severe flank pain, fevers, chills, nausea, or vomiting please notify Urology clinic or be seen in the ER.       If you have any issues, questions or concerns in the meantime, do not hesitate to contact us at [facility] at [phone] or via [employer].     Ariana Keith CNP  Department of Urology       DIET & LIFESTYLE CHANGES FOR PATIENTS WITH KIDNEY STONES    If you've had a kidney stone, you are likely to form another. Risk of recurrence is 15% at 1 year, 35% to 40% at 2 years, and 50% at 10 years. Therefore, prevention is very important.   These recommendations have shown to be effective.    CALCIUM STONES (Oxalate and Phosphate)    Fluid intake is the most important prevention measure to help prevent stones. Fluid intake should be at least 2.5 liters per day or 90-120oz per day. With goal of urine output of >2.5L per day.   Increasing liquids that have citric acid may help such as low calorie orange juice, lemonade (Crystal Light Lemonade or True Lemon/Lime), or adding a citrus to your water.  You can add lemon juice or fresh nava to your water daily.  Lemon juice increases the citrate in your urine, and helps decrease the formation of stone and even breakdown certain types of stones. Add a cap full/teaspoon of pure lemon juice to each glass.   Try to limit sugar, especially if you have diabetes.    Helpful Fluid Measurements:  1 liter = 34oz  1 quart = 32 oz  24 pack water: Each bottle 16.9 oz     Low Oxalate Diet: Limit your consumption of OXALATE-rich foods including:  All nuts and nut products including peanuts, almonds,peanut butter, almond milk  Spinach  Rhubarb  Beets  Chocolate  Soybeans and soy products     Website:   www.kidneystonediet.com    Below is a link to a PDF that is based on [employer] research. Please stick to pages 6-9 of this document. My suggestion is to review the list of food that is OK. The \"avoid\" list can " be overwhelming.  [email or web address]_[email or web address]    Low Sodium Diet: Salt (sodium chloride) is found in abundance in many foods. High sodium levels in the urine can interfere with the kidney's handling of calcium.   Trying a DASH (Dietary Approaches to Stop Hypertension) diet which is eating more fruits and vegetables, limiting salt intake, moderate in low-fat diary products, and low in animal protein.   Try to decrease salt intake to <2000 mg of sodium daily.     Tips for reducing the salt in your diet:  Don't use salt at the table  Reduce the salt used in food preparation. Try 1/2 teaspoon when recipes call for 1 teaspoon.  Use herbs and spices for flavoring instead of salt.  Avoid salty foods.  Check the label before you buy or use a product. Note sodium and portion size information.  Try to consume less than 2,000 mg/day. (1 teaspoon = 2,000 mg)    Foods with high sodium content include:  Processed meat (including luncheon meats, sausage)   Crackers   Instant cereal   Processed cheese   Canned soups   Chips and snack foods   Soy sauce    Low Animal Protein: Reduce animal protein (meat) intake to no more than 8-10 ounces per day. Increase fruit and vegetables to 5 servings per day.    Maintain a normal calcium diet: Calcium rich foods are encouraged, but no more than 1000 - 1200 mg per day. Researches have found that people with low calcium intakes tend to have more stones. Foods with high calcium content are acceptable and include:  Calcium rich foods include:   Diary (cheese, milk, and yogurt)  Enriched cereals  Meat and fish  Dark green vegetables    Limit Vitamin C intake to < 1000 mg daily.    Consultation with a dietician may be helpful as well.  Please let our staff know if you are interested in this helpful option so a consult may be placed for you.

## 2024-12-05 NOTE — PROGRESS NOTES
Urology Video Office Visit    Video-Visit Details    Type of service:  Video Visit    Video Start Time: 1035    Video End Time: 1050    Originating Location (pt. Location): Home    Distant Location (provider location):  Off-site     Platform used for Video Visit: Dat[employer]           Assessment and Plan:     Assessment:69 year old male with right ureteral stone.    Plan:   -Reviewed CT scan with patient. Noted right proximal ureteral stone with nonobstructing right renal stone. Discussed option for repeat imaging to ensure stone passage. Pt deferred at this time.   -Discussed option of 24 hour urine study for further evaluation for risks of stones. Pt deferred at this time.   -Recommend to continue with general stone prevention measurements.   -If having severe flank pain, fevers, chills, nausea, or vomiting please notify Urology clinic or be seen in the ER.   -Discussed option to RTC in 1 year. He would like to come back on a PRN basis.     Ariana Keith CNP  Department of Urology  December 5, 2024    I spent a total of 25 minutes spent on the date of the encounter doing chart review, history and exam, documentation, and further activities as noted above.          Chief Complaint:   Right Ureteral Stone         History of Present Illness:    Bertrand Palacios is a pleasant 69 year old male who presents with concerns of a right ureteral stone.     Mr. Palacios was seen in the ED on 11/16/24 for concerns of right flank pain.     CT scan on 11/16/24 (images personally reviewed) revealed a right 2mm proximal ureteral stone with hydronephrosis. Noted small 1-2mm nonobstructing right renal stone. No noted left hydronephrosis or nephrolithiasis.     He has been doing well since his ED visit. Denies any flank pain, f/c/n/v, gross hematuria, or dysuria. He has not seen his stone pass as of yet.     He has a long history of nephrolithiasis with first stone about 20 years ago. He will have a stone episode every 3-5 years. He  has been able to pass all his stone on his own. He has not been able to have any previous stones analyzed for composition.      He recently had hiatal hernia surgery and believe his stone were attributed to antacid use. He is no longer needing antacids after his procedure.          Past Medical History:     Past Medical History:   Diagnosis Date    Cancer (H)     bladder    Coronary artery disease     S/P PCI in 2006 Per H&P    Hiatal hernia     Per H&P    History of ASCVD     per H&P    HLD (hyperlipidemia)     Hypertension     Stented coronary artery             Past Surgical History:     Past Surgical History:   Procedure Laterality Date    BLADDER SURGERY  01/01/2008    tumor     CARDIAC CATHETERIZATION      2006 Per H&P    CHOLECYSTECTOMY      CV CORONARY ANGIOGRAM N/A 03/06/2020    Procedure: Coronary Angiogram;  Surgeon: Tony Juárez MD;  Location: [facility] Cath Lab;  Service: Cardiology    CV CORONARY ANGIOGRAM N/A 01/25/2024    Procedure: Coronary Angiogram;  Surgeon: Christopher Hogan MD;  Location: [facility] CATH LAB CV    CV HEART CATHETERIZATION WITH POSSIBLE INTERVENTION N/A 07/15/2021    Procedure: Coronary Angiogram;  Surgeon: Tony Juárez MD;  Location: [facility] CATH LAB CV    CV LEFT HEART CATH N/A 01/25/2024    Procedure: Left Heart Catheterization;  Surgeon: Christopher Hogan MD;  Location: [facility] LAB CV    CV PCI N/A 07/15/2021    Procedure: PERCUTANEOUS CORONARY INTERVENTION;  Surgeon: Tony Juárez MD;  Location: [facility] CATH LAB CV    GENITOURINARY SURGERY      IR MISCELLANEOUS PROCEDURE  11/09/2006    LAPAROSCOPIC NISSEN FUNDOPLICATION N/A 10/18/2024    Procedure: HIATAL HERNIA REPAIR WITH FUNDOPLICATION, PARTIAL POSTERIOR, ROBOT-ASSISTED, LAPAROSCOPIC, USING DA SARITHA XI;  Surgeon: Emile Mcintyre MD;  Location: [facility] OR            Medications     Current Outpatient Medications   Medication Sig Dispense Refill    aspirin 81 MG EC tablet [ASPIRIN 81 MG EC  TABLET] Take 81 mg by mouth daily.      B Complex Vitamins (B COMPLEX PO) Take 1 capsule by mouth daily      Cholecalciferol (VITAMIN D3 PO) Take 1,000 Units by mouth daily.      clopidogrel (PLAVIX) 75 MG tablet Take 75 mg by mouth daily      Cyanocobalamin 2000 MCG TBCR Take 1 tablet by mouth daily.      latanoprost (XALATAN) 0.005 % ophthalmic solution Place 1 drop into both eyes at bedtime      lisinopril (PRINIVIL,ZESTRIL) 2.5 MG tablet [LISINOPRIL (PRINIVIL,ZESTRIL) 2.5 MG TABLET] Take 2.5 mg by mouth daily.      metoprolol succinate (TOPROL-XL) 50 MG 24 hr tablet [METOPROLOL SUCCINATE (TOPROL-XL) 50 MG 24 HR TABLET] Take 1 tablet (50 mg total) by mouth daily. 90 tablet 1    multivitamin w/minerals (MULTI-VITAMIN) tablet Take 1 tablet by mouth daily.      nitroGLYcerin (NITROSTAT) 0.4 MG sublingual tablet [NITROGLYCERIN (NITROSTAT) 0.4 MG SL TABLET] PLACE 1 TABLET UNDER THE TONGUE EVERY 5MIN AS NEEDED FOR CHEST PAIN. IF SYMPTOMS DO NOT RESOLVE IN 5MIN, REPEAT DOSE AND CALL 911 90 tablet 1    omeprazole (PRILOSEC) 20 MG capsule Take 20 mg by mouth 2 times daily (before meals)       rosuvastatin (CRESTOR) 40 MG tablet Take 1 tablet (40 mg) by mouth daily 90 tablet 3    senna-docusate (SENOKOT-S/PERICOLACE) 8.6-50 MG tablet Take 1 tablet by mouth daily. 30 tablet 0     No current facility-administered medications for this visit.            Family History:     Family History   Problem Relation Age of Onset    Cerebrovascular Disease Mother     Heart Disease Mother     Cancer Father     No Known Problems Sister     Other - See Comments Brother 77.00    No Known Problems Brother     No Known Problems Sister             Social History:     Social History     Socioeconomic History    Marital status:      Spouse name: Not on file    Number of children: Not on file    Years of education: Not on file    Highest education level: Not on file   Occupational History    Not on file   Tobacco Use    Smoking status:  Former     Current packs/day: 0.00     Types: Cigarettes     Quit date: 2008     Years since quittin.0    Smokeless tobacco: Never   Substance and Sexual Activity    Alcohol use: Never    Drug use: Not Currently     Types: Marijuana     Comment: none for the past 35 years    Sexual activity: Not Currently   Other Topics Concern    Not on file   Social History Narrative    Not on file     Social Drivers of Health     Financial Resource Strain: Low Risk  (10/19/2024)    Financial Resource Strain     Within the past 12 months, have you or your family members you live with been unable to get utilities (heat, electricity) when it was really needed?: No   Food Insecurity: Low Risk  (10/19/2024)    Food Insecurity     Within the past 12 months, did you worry that your food would run out before you got money to buy more?: No     Within the past 12 months, did the food you bought just not last and you didn t have money to get more?: No   Transportation Needs: Low Risk  (10/19/2024)    Transportation Needs     Within the past 12 months, has lack of transportation kept you from medical appointments, getting your medicines, non-medical meetings or appointments, work, or from getting things that you need?: No   Physical Activity: Not on file   Stress: Not on file   Social Connections: Socially Integrated (2024)    [facility]    Social Connections     Do you often feel lonely or isolated from those around you?: 0   Interpersonal Safety: Low Risk  (10/18/2024)    Interpersonal Safety     Do you feel physically and emotionally safe where you currently live?: Yes     Within the past 12 months, have you been hit, slapped, kicked or otherwise physically hurt by someone?: No     Within the past 12 months, have you been humiliated or emotionally abused in other ways by your partner or ex-partner?: No   Housing Stability: Low Risk  (10/19/2024)    Housing Stability     Do  you have housing? : Yes     Are you worried about losing your housing?: No            Allergies:   Ticagrelor and Isosorbide         Review of Systems:  From intake questionnaire   Negative 14 system review except as noted on HPI, nurse's note.         Physical Exam:   General Appearance: Well groomed, hygenic  Eyes: No redness, discharge  Respiratory: No cough, no respiratory distress or labored breathing  Musculoskeletal: Grossly normal, full range of motion in upper extremities, no gross deficits  Skin: No discoloration or apparent rashes  Neurologic - No tremors  Psychiatric - Alert and oriented  The rest of a comprehensive physical examination is deferred due to video visit restrictions        Labs:    I personally reviewed all applicable laboratory data and went over findings with patient  Significant for:    CBC RESULTS:  Recent Labs   Lab Test 11/16/24  0700 10/19/24  0744 01/25/24  0445 01/24/24 2036 07/17/21  0121   WBC 10.7  --  10.1 9.7 13.9*   HGB 15.4  --  15.6 16.3 15.7    170 191 210 185        BMP RESULTS:  Recent Labs   Lab Test 11/16/24  0700 10/19/24  1112 10/19/24  0744 10/18/24  1853 10/18/24  1803 01/25/24  0445 01/24/24 2036 07/17/21  0121 07/17/21  0121 07/15/21  0717 03/07/20  0708 03/06/20  1005 02/28/20  2214 02/13/20 2010     --   --   --   --  141 143  --  140   < > 139 142 142 143   POTASSIUM 3.5  --   --   --   --  4.1 4.1  --  4.2   < > 4.4 4.2 3.8 4.0   CHLORIDE 108*  --   --   --   --  109* 110*  --  111*   < > 110* 110* 106 108*   CO2 22  --   --   --   --  24 23  --  22   < > 19* 25 27 28   ANIONGAP 13  --   --   --   --  8 10  --  7   < > 10 7 9 7   * 190*  --  174*  --  119* 130*   < > 137*   < > 100 112 119 108   BUN 15.3  --   --   --   --  18.9 17.7  --  15   < > 15 16 17 15   CR 1.03  --  0.99  --  1.07 0.97 0.89  --  0.82   < > 0.77 0.87 0.98 0.82   GFRESTIMATED 78  --  82  --  75 85 >90  --  >90   < > >60 >60 >60 >60   GFRESTBLACK  --   --   --   --    --   --   --   --   --   --  >60 >60 >60 >60   LESLIE 8.8  --   --   --   --  8.6* 8.5*  --  8.7   < > 9.1 9.4 9.2 9.1    < > = values in this interval not displayed.       UA RESULTS:   Recent Labs   Lab Test 11/16/24  0910   SG <=1.000*   URINEPH 5.5   NITRITE Negative   RBCU >182*   WBCU 19*       CALCIUM RESULTS  Lab Results   Component Value Date    LESLIE 8.8 11/16/2024    LESLIE 8.6 01/25/2024    LESLIE 8.5 01/24/2024           Imaging:    I personally reviewed all applicable imaging and went over the below findings with patient.    Results for orders placed or performed during the hospital encounter of 11/16/24   CT Abdomen Pelvis w Contrast    Narrative    EXAM: CT ABDOMEN AND PELVIS WITH CONTRAST  LOCATION: [facility]  DATE: 11/16/2024    INDICATION: Pain.  Evaluate for stones. Recent hiatal hernia repair.  COMPARISON: 01/24/2024.  TECHNIQUE: CT scan of the abdomen and pelvis was performed following injection of IV contrast. Multiplanar reformats were obtained. Dose reduction techniques were used.  CONTRAST: Isovue 370, 90 mL.    FINDINGS:   LOWER CHEST: Normal.    HEPATOBILIARY: Status post cholecystectomy.    PANCREAS: Normal.    SPLEEN: Normal.    ADRENAL GLANDS: Normal.    KIDNEYS/BLADDER: 2 mm obstructing stone seen in the right proximal ureter resulting in mild right hydronephrosis. Simple bilateral renal cysts, no follow-up required. Normal bladder.    BOWEL: Status post hiatal hernia repair. No obstruction, colitis, or diverticulitis. No free air or fluid.    LYMPH NODES: Normal.    VASCULATURE: Normal.    PELVIC ORGANS: Normal.    MUSCULOSKELETAL: Mild multilevel discogenic degenerative change.      Impression    IMPRESSION:   1.  2 mm right proximal ureteral stone resulting in mild right-sided hydroureter.  2.  Atherosclerotic vascular disease.  3.  Status post cholecystectomy.

## 2024-12-05 NOTE — PROGRESS NOTES
Virtual Visit Details    Type of service:  Video Visit   Video Start Time: {video visit start/end time for provider to select:928661}  Video End Time:{video visit start/end time for provider to select:455337}    Originating Location (pt. Location): Home  {PROVIDER LOCATION On-site should be selected for visits conducted from your clinic location or adjoining [facility] hospital, academic office, or other nearby [facility] building. Off-site should be selected for all other provider locations, including home:[id number]}  Distant Location (provider location):  On-site  Platform used for Video Visit: [employer]

## 2025-01-02 ENCOUNTER — TELEPHONE (OUTPATIENT)
Dept: SURGERY | Facility: CLINIC | Age: 70
End: 2025-01-02
Payer: COMMERCIAL

## 2025-01-02 NOTE — TELEPHONE ENCOUNTER
"Patient is s/p robot assisted paraesophageal hernia repair with partial posterior fundoplication with Dr. Mcintyre on 10/18/24. Calls today with concerns for some \"heaviness and tightness\" that he feels with movement. States that is is often when he is stretching or moving in upper abdomen. Chatted with GF and he does not this this is related to the surgery and possibly had another cause. Will advise patient to monitor for the next coming weeks and reach out to his primary for any further recommendations.     Pinky Salmeron RN  [facility]  General Surgery  [address] [place] [id number]  [email or web address]  Office:[phone]  Employed by [facility]        "

## 2025-03-15 ENCOUNTER — HEALTH MAINTENANCE LETTER (OUTPATIENT)
Age: 70
End: 2025-03-15

## 2025-05-17 ENCOUNTER — HEALTH MAINTENANCE LETTER (OUTPATIENT)
Age: 70
End: 2025-05-17

## 2025-08-01 ENCOUNTER — APPOINTMENT (OUTPATIENT)
Dept: RADIOLOGY | Facility: HOSPITAL | Age: 70
End: 2025-08-01
Attending: EMERGENCY MEDICINE
Payer: COMMERCIAL

## 2025-08-01 ENCOUNTER — HOSPITAL ENCOUNTER (OUTPATIENT)
Facility: HOSPITAL | Age: 70
Setting detail: OBSERVATION
Discharge: HOME | End: 2025-08-02
Attending: EMERGENCY MEDICINE | Admitting: HOSPITALIST
Payer: COMMERCIAL

## 2025-08-01 DIAGNOSIS — I25.10 CORONARY ARTERY DISEASE DUE TO CALCIFIED CORONARY LESION: ICD-10-CM

## 2025-08-01 DIAGNOSIS — I25.84 CORONARY ARTERY DISEASE DUE TO CALCIFIED CORONARY LESION: ICD-10-CM

## 2025-08-01 DIAGNOSIS — R07.9 CHEST PAIN, UNSPECIFIED TYPE: Primary | ICD-10-CM

## 2025-08-01 DIAGNOSIS — N63.25 MASS OVERLAPPING MULTIPLE QUADRANTS OF LEFT BREAST: ICD-10-CM

## 2025-08-01 LAB
ANION GAP SERPL CALCULATED.3IONS-SCNC: 8 MMOL/L (ref 7–15)
BUN SERPL-MCNC: 12.4 MG/DL (ref 8–23)
CALCIUM SERPL-MCNC: 8.7 MG/DL (ref 8.8–10.4)
CHLORIDE SERPL-SCNC: 110 MMOL/L (ref 98–107)
CREAT SERPL-MCNC: 0.8 MG/DL (ref 0.67–1.17)
EGFRCR SERPLBLD CKD-EPI 2021: >90 ML/MIN/1.73M2
ERYTHROCYTE [DISTWIDTH] IN BLOOD BY AUTOMATED COUNT: 13.8 % (ref 10–15)
GLUCOSE SERPL-MCNC: 157 MG/DL (ref 70–99)
HCO3 SERPL-SCNC: 23 MMOL/L (ref 22–29)
HCT VFR BLD AUTO: 47.4 % (ref 40–53)
HGB BLD-MCNC: 15.6 G/DL (ref 13.3–17.7)
HOLD SPECIMEN: NORMAL
HOLD SPECIMEN: NORMAL
MCH RBC QN AUTO: 28.9 PG (ref 26.5–33)
MCHC RBC AUTO-ENTMCNC: 32.9 G/DL (ref 31.5–36.5)
MCV RBC AUTO: 88 FL (ref 78–100)
NT-PROBNP SERPL-MCNC: 56 PG/ML (ref 0–229)
PLATELET # BLD AUTO: 175 10E3/UL (ref 150–450)
POTASSIUM SERPL-SCNC: 4.1 MMOL/L (ref 3.4–5.3)
RBC # BLD AUTO: 5.39 10E6/UL (ref 4.4–5.9)
SODIUM SERPL-SCNC: 141 MMOL/L (ref 135–145)
TROPONIN T SERPL HS-MCNC: 6 NG/L
TROPONIN T SERPL HS-MCNC: 7 NG/L
WBC # BLD AUTO: 7.5 10E3/UL (ref 4–11)

## 2025-08-01 PROCEDURE — 71046 X-RAY EXAM CHEST 2 VIEWS: CPT

## 2025-08-01 PROCEDURE — 93005 ELECTROCARDIOGRAM TRACING: CPT | Performed by: STUDENT IN AN ORGANIZED HEALTH CARE EDUCATION/TRAINING PROGRAM

## 2025-08-01 PROCEDURE — 36415 COLL VENOUS BLD VENIPUNCTURE: CPT | Performed by: EMERGENCY MEDICINE

## 2025-08-01 PROCEDURE — 99223 1ST HOSP IP/OBS HIGH 75: CPT | Performed by: HOSPITALIST

## 2025-08-01 PROCEDURE — 80048 BASIC METABOLIC PNL TOTAL CA: CPT | Performed by: EMERGENCY MEDICINE

## 2025-08-01 PROCEDURE — G0378 HOSPITAL OBSERVATION PER HR: HCPCS

## 2025-08-01 PROCEDURE — 84484 ASSAY OF TROPONIN QUANT: CPT | Performed by: EMERGENCY MEDICINE

## 2025-08-01 PROCEDURE — 83880 ASSAY OF NATRIURETIC PEPTIDE: CPT | Performed by: HOSPITALIST

## 2025-08-01 PROCEDURE — 250N000013 HC RX MED GY IP 250 OP 250 PS 637: Performed by: HOSPITALIST

## 2025-08-01 PROCEDURE — 85014 HEMATOCRIT: CPT | Performed by: EMERGENCY MEDICINE

## 2025-08-01 PROCEDURE — 99285 EMERGENCY DEPT VISIT HI MDM: CPT | Mod: 25 | Performed by: EMERGENCY MEDICINE

## 2025-08-01 PROCEDURE — 250N000013 HC RX MED GY IP 250 OP 250 PS 637: Performed by: EMERGENCY MEDICINE

## 2025-08-01 RX ORDER — VITAMIN B COMPLEX
CAPSULE ORAL DAILY
Status: DISCONTINUED | OUTPATIENT
Start: 2025-08-02 | End: 2025-08-01

## 2025-08-01 RX ORDER — CLOPIDOGREL BISULFATE 75 MG/1
75 TABLET ORAL DAILY
Status: DISCONTINUED | OUTPATIENT
Start: 2025-08-02 | End: 2025-08-02 | Stop reason: HOSPADM

## 2025-08-01 RX ORDER — CHOLESTYRAMINE 4 G/9G
1 POWDER, FOR SUSPENSION ORAL 2 TIMES DAILY WITH MEALS
Status: DISCONTINUED | OUTPATIENT
Start: 2025-08-02 | End: 2025-08-02 | Stop reason: HOSPADM

## 2025-08-01 RX ORDER — ONDANSETRON 4 MG/1
4 TABLET, ORALLY DISINTEGRATING ORAL EVERY 6 HOURS PRN
Status: DISCONTINUED | OUTPATIENT
Start: 2025-08-01 | End: 2025-08-02 | Stop reason: HOSPADM

## 2025-08-01 RX ORDER — ASPIRIN 81 MG/1
81 TABLET ORAL DAILY
Status: DISCONTINUED | OUTPATIENT
Start: 2025-08-02 | End: 2025-08-02 | Stop reason: HOSPADM

## 2025-08-01 RX ORDER — LATANOPROST 50 UG/ML
1 SOLUTION/ DROPS OPHTHALMIC AT BEDTIME
Status: DISCONTINUED | OUTPATIENT
Start: 2025-08-01 | End: 2025-08-02 | Stop reason: HOSPADM

## 2025-08-01 RX ORDER — VITAMIN B COMPLEX
25 TABLET ORAL DAILY
Status: DISCONTINUED | OUTPATIENT
Start: 2025-08-02 | End: 2025-08-02 | Stop reason: HOSPADM

## 2025-08-01 RX ORDER — ASPIRIN 81 MG/1
162 TABLET, CHEWABLE ORAL ONCE
Status: COMPLETED | OUTPATIENT
Start: 2025-08-01 | End: 2025-08-01

## 2025-08-01 RX ORDER — AMOXICILLIN 250 MG
2 CAPSULE ORAL 2 TIMES DAILY PRN
Status: DISCONTINUED | OUTPATIENT
Start: 2025-08-01 | End: 2025-08-02 | Stop reason: HOSPADM

## 2025-08-01 RX ORDER — CHOLECALCIFEROL (VITAMIN D3) 1250 MCG
1250 CAPSULE ORAL DAILY
Status: DISCONTINUED | OUTPATIENT
Start: 2025-08-02 | End: 2025-08-01

## 2025-08-01 RX ORDER — LOPERAMIDE HYDROCHLORIDE 2 MG/1
4 CAPSULE ORAL 4 TIMES DAILY PRN
Status: DISCONTINUED | OUTPATIENT
Start: 2025-08-01 | End: 2025-08-02 | Stop reason: HOSPADM

## 2025-08-01 RX ORDER — ROSUVASTATIN CALCIUM 40 MG/1
40 TABLET, COATED ORAL DAILY
Status: DISCONTINUED | OUTPATIENT
Start: 2025-08-02 | End: 2025-08-02 | Stop reason: HOSPADM

## 2025-08-01 RX ORDER — ONDANSETRON 2 MG/ML
4 INJECTION INTRAMUSCULAR; INTRAVENOUS EVERY 6 HOURS PRN
Status: DISCONTINUED | OUTPATIENT
Start: 2025-08-01 | End: 2025-08-02 | Stop reason: HOSPADM

## 2025-08-01 RX ORDER — LISINOPRIL 2.5 MG/1
2.5 TABLET ORAL DAILY
Status: DISCONTINUED | OUTPATIENT
Start: 2025-08-02 | End: 2025-08-02 | Stop reason: HOSPADM

## 2025-08-01 RX ORDER — NITROGLYCERIN 0.4 MG/1
0.4 TABLET SUBLINGUAL EVERY 5 MIN PRN
Status: DISCONTINUED | OUTPATIENT
Start: 2025-08-01 | End: 2025-08-02 | Stop reason: HOSPADM

## 2025-08-01 RX ORDER — PROCHLORPERAZINE MALEATE 5 MG/1
5 TABLET ORAL EVERY 6 HOURS PRN
Status: DISCONTINUED | OUTPATIENT
Start: 2025-08-01 | End: 2025-08-02 | Stop reason: HOSPADM

## 2025-08-01 RX ORDER — AMOXICILLIN 250 MG
1 CAPSULE ORAL 2 TIMES DAILY PRN
Status: DISCONTINUED | OUTPATIENT
Start: 2025-08-01 | End: 2025-08-02 | Stop reason: HOSPADM

## 2025-08-01 RX ORDER — METOPROLOL SUCCINATE 50 MG/1
50 TABLET, EXTENDED RELEASE ORAL DAILY
Status: DISCONTINUED | OUTPATIENT
Start: 2025-08-02 | End: 2025-08-02 | Stop reason: HOSPADM

## 2025-08-01 RX ADMIN — LATANOPROST 1 DROP: 50 SOLUTION/ DROPS OPHTHALMIC at 22:07

## 2025-08-01 RX ADMIN — ASPIRIN 81 MG CHEWABLE TABLET 162 MG: 81 TABLET CHEWABLE at 13:18

## 2025-08-01 RX ADMIN — NITROGLYCERIN 0.4 MG: 0.4 TABLET, ORALLY DISINTEGRATING SUBLINGUAL at 13:19

## 2025-08-01 RX ADMIN — LOPERAMIDE HYDROCHLORIDE 4 MG: 2 CAPSULE ORAL at 22:06

## 2025-08-01 ASSESSMENT — ACTIVITIES OF DAILY LIVING (ADL)
ADLS_ACUITY_SCORE: 33
ADLS_ACUITY_SCORE: 32
ADLS_ACUITY_SCORE: 31
ADLS_ACUITY_SCORE: 51
ADLS_ACUITY_SCORE: 50
ADLS_ACUITY_SCORE: 50
ADLS_ACUITY_SCORE: 31
ADLS_ACUITY_SCORE: 50
ADLS_ACUITY_SCORE: 33
ADLS_ACUITY_SCORE: 32
ADLS_ACUITY_SCORE: 32

## 2025-08-01 ASSESSMENT — COLUMBIA-SUICIDE SEVERITY RATING SCALE - C-SSRS
1. IN THE PAST MONTH, HAVE YOU WISHED YOU WERE DEAD OR WISHED YOU COULD GO TO SLEEP AND NOT WAKE UP?: NO
2. HAVE YOU ACTUALLY HAD ANY THOUGHTS OF KILLING YOURSELF IN THE PAST MONTH?: NO
6. HAVE YOU EVER DONE ANYTHING, STARTED TO DO ANYTHING, OR PREPARED TO DO ANYTHING TO END YOUR LIFE?: NO

## 2025-08-02 ENCOUNTER — APPOINTMENT (OUTPATIENT)
Dept: CARDIOLOGY | Facility: HOSPITAL | Age: 70
End: 2025-08-02
Attending: HOSPITALIST
Payer: COMMERCIAL

## 2025-08-02 VITALS
WEIGHT: 220.6 LBS | OXYGEN SATURATION: 93 % | HEART RATE: 66 BPM | RESPIRATION RATE: 16 BRPM | SYSTOLIC BLOOD PRESSURE: 145 MMHG | BODY MASS INDEX: 33.43 KG/M2 | HEIGHT: 68 IN | DIASTOLIC BLOOD PRESSURE: 68 MMHG | TEMPERATURE: 98.4 F

## 2025-08-02 PROBLEM — J43.9 EMPHYSEMA LUNG (H): Status: ACTIVE | Noted: 2025-08-02

## 2025-08-02 PROBLEM — N63.20 LEFT BREAST LUMP: Status: ACTIVE | Noted: 2025-08-02

## 2025-08-02 PROBLEM — K44.9 HIATAL HERNIA WITH GERD: Status: ACTIVE | Noted: 2024-10-18

## 2025-08-02 PROBLEM — K21.9 HIATAL HERNIA WITH GERD: Status: ACTIVE | Noted: 2024-10-18

## 2025-08-02 LAB
D DIMER PPP FEU-MCNC: 0.3 UG/ML FEU (ref 0–0.5)
LVEF ECHO: NORMAL
TROPONIN T SERPL HS-MCNC: 7 NG/L
TROPONIN T SERPL HS-MCNC: 7 NG/L

## 2025-08-02 PROCEDURE — 250N000013 HC RX MED GY IP 250 OP 250 PS 637: Performed by: HOSPITALIST

## 2025-08-02 PROCEDURE — G0378 HOSPITAL OBSERVATION PER HR: HCPCS

## 2025-08-02 PROCEDURE — 255N000002 HC RX 255 OP 636: Performed by: HOSPITALIST

## 2025-08-02 PROCEDURE — 93306 TTE W/DOPPLER COMPLETE: CPT | Mod: 26 | Performed by: INTERNAL MEDICINE

## 2025-08-02 PROCEDURE — 85379 FIBRIN DEGRADATION QUANT: CPT | Performed by: INTERNAL MEDICINE

## 2025-08-02 PROCEDURE — 999N000208 ECHOCARDIOGRAM COMPLETE

## 2025-08-02 PROCEDURE — 99239 HOSP IP/OBS DSCHRG MGMT >30: CPT | Performed by: INTERNAL MEDICINE

## 2025-08-02 PROCEDURE — 36415 COLL VENOUS BLD VENIPUNCTURE: CPT | Performed by: INTERNAL MEDICINE

## 2025-08-02 PROCEDURE — 84484 ASSAY OF TROPONIN QUANT: CPT | Performed by: INTERNAL MEDICINE

## 2025-08-02 RX ADMIN — CLOPIDOGREL 75 MG: 75 TABLET ORAL at 07:54

## 2025-08-02 RX ADMIN — Medication 25 MCG: at 07:54

## 2025-08-02 RX ADMIN — LISINOPRIL 2.5 MG: 2.5 TABLET ORAL at 07:55

## 2025-08-02 RX ADMIN — B-COMPLEX W/ C & FOLIC ACID TAB 1 TABLET: TAB at 07:55

## 2025-08-02 RX ADMIN — PERFLUTREN 3 ML (DILUTED): 6.52 INJECTION, SUSPENSION INTRAVENOUS at 10:14

## 2025-08-02 RX ADMIN — ROSUVASTATIN 40 MG: 40 TABLET, FILM COATED ORAL at 07:54

## 2025-08-02 RX ADMIN — ASPIRIN 81 MG: 81 TABLET, COATED ORAL at 07:55

## 2025-08-02 RX ADMIN — METOPROLOL SUCCINATE 50 MG: 50 TABLET, EXTENDED RELEASE ORAL at 07:55

## 2025-08-02 ASSESSMENT — ACTIVITIES OF DAILY LIVING (ADL)
ADLS_ACUITY_SCORE: 32
ADLS_ACUITY_SCORE: 36
ADLS_ACUITY_SCORE: 36
ADLS_ACUITY_SCORE: 32
ADLS_ACUITY_SCORE: 32
ADLS_ACUITY_SCORE: 36
ADLS_ACUITY_SCORE: 36
ADLS_ACUITY_SCORE: 32
ADLS_ACUITY_SCORE: 36
ADLS_ACUITY_SCORE: 32

## 2025-08-04 ENCOUNTER — PATIENT OUTREACH (OUTPATIENT)
Dept: CARE COORDINATION | Facility: CLINIC | Age: 70
End: 2025-08-04
Payer: COMMERCIAL

## 2025-08-05 ENCOUNTER — ANCILLARY PROCEDURE (OUTPATIENT)
Dept: MAMMOGRAPHY | Facility: CLINIC | Age: 70
End: 2025-08-05
Attending: INTERNAL MEDICINE
Payer: COMMERCIAL

## 2025-08-05 DIAGNOSIS — N63.25 MASS OVERLAPPING MULTIPLE QUADRANTS OF LEFT BREAST: ICD-10-CM

## 2025-08-05 LAB
ATRIAL RATE - MUSE: 66 BPM
DIASTOLIC BLOOD PRESSURE - MUSE: 70 MMHG
INTERPRETATION ECG - MUSE: NORMAL
P AXIS - MUSE: 66 DEGREES
PR INTERVAL - MUSE: 188 MS
QRS DURATION - MUSE: 86 MS
QT - MUSE: 384 MS
QTC - MUSE: 402 MS
R AXIS - MUSE: 20 DEGREES
SYSTOLIC BLOOD PRESSURE - MUSE: 149 MMHG
T AXIS - MUSE: 62 DEGREES
VENTRICULAR RATE- MUSE: 66 BPM

## 2025-08-05 PROCEDURE — 76642 ULTRASOUND BREAST LIMITED: CPT | Mod: LT

## 2025-08-05 PROCEDURE — 77066 DX MAMMO INCL CAD BI: CPT

## 2025-08-11 ENCOUNTER — HOSPITAL ENCOUNTER (OUTPATIENT)
Dept: NUCLEAR MEDICINE | Facility: HOSPITAL | Age: 70
Discharge: HOME | End: 2025-08-11
Attending: INTERNAL MEDICINE
Payer: COMMERCIAL

## 2025-08-11 ENCOUNTER — HOSPITAL ENCOUNTER (OUTPATIENT)
Dept: CARDIOLOGY | Facility: HOSPITAL | Age: 70
Discharge: HOME | End: 2025-08-11
Attending: INTERNAL MEDICINE
Payer: COMMERCIAL

## 2025-08-11 DIAGNOSIS — I25.10 CORONARY ARTERY DISEASE DUE TO CALCIFIED CORONARY LESION: ICD-10-CM

## 2025-08-11 DIAGNOSIS — R07.9 CHEST PAIN, UNSPECIFIED TYPE: ICD-10-CM

## 2025-08-11 DIAGNOSIS — I25.84 CORONARY ARTERY DISEASE DUE TO CALCIFIED CORONARY LESION: ICD-10-CM

## 2025-08-11 LAB
CV STRESS MAX HR HE: 148
NUC STRESS EJECTION FRACTION: 62 %
RATE PRESSURE PRODUCT: NORMAL
STRESS ECHO BASELINE DIASTOLIC HE: 70
STRESS ECHO BASELINE HR: 88 BPM
STRESS ECHO BASELINE SYSTOLIC BP: 154
STRESS ECHO CALCULATED PERCENT HR: 99 %
STRESS ECHO LAST STRESS DIASTOLIC BP: 80
STRESS ECHO LAST STRESS SYSTOLIC BP: 184
STRESS ECHO POST ESTIMATED WORKLOAD: 8 METS
STRESS ECHO POST EXERCISE DUR MIN: 6 MIN
STRESS ECHO POST EXERCISE DUR SEC: 30 SEC
STRESS ECHO TARGET HR: 149

## 2025-08-11 PROCEDURE — 93016 CV STRESS TEST SUPVJ ONLY: CPT | Performed by: INTERNAL MEDICINE

## 2025-08-11 PROCEDURE — 78452 HT MUSCLE IMAGE SPECT MULT: CPT | Mod: 26 | Performed by: INTERNAL MEDICINE

## 2025-08-11 PROCEDURE — 93017 CV STRESS TEST TRACING ONLY: CPT

## 2025-08-11 PROCEDURE — 93018 CV STRESS TEST I&R ONLY: CPT | Performed by: INTERNAL MEDICINE

## 2025-08-11 PROCEDURE — A9500 TC99M SESTAMIBI: HCPCS | Performed by: INTERNAL MEDICINE

## 2025-08-11 PROCEDURE — 343N000001 HC RX 343 MED OP 636: Performed by: INTERNAL MEDICINE

## 2025-08-11 PROCEDURE — 78452 HT MUSCLE IMAGE SPECT MULT: CPT

## 2025-08-11 RX ADMIN — TECHNETIUM TC 99M SESTAMIBI 8.8 MILLICURIE: 1 INJECTION INTRAVENOUS at 12:00

## 2025-08-11 RX ADMIN — TECHNETIUM TC 99M SESTAMIBI 30.3 MILLICURIE: 1 INJECTION INTRAVENOUS at 12:55

## (undated) DEVICE — DRAPE SHEET TABLE COVER KC 42301*

## (undated) DEVICE — CATH ANGIO INFINITI JL4 4FRX100CM 538420

## (undated) DEVICE — SHEATH GLIDE RADIAL 4FR 25CM 0.021

## (undated) DEVICE — ELECTRODE DEFIB CADENCE 22550R

## (undated) DEVICE — DAVINCI XI HANDPIECE ESU VESSEL SEALER 8MM EXT 480422

## (undated) DEVICE — INTRO SHEATH 4FRX10CM PINNACLE RSS402

## (undated) DEVICE — TUBING SMOKE EVAC PNEUMOCLEAR HIGH FLOW 0620050250

## (undated) DEVICE — SLEEVE TR BAND RADIAL COMPRESSION DEVICE 24CM TRB24-REG

## (undated) DEVICE — DAVINCI XI SEAL UNIVERSAL 5-12MM 470500

## (undated) DEVICE — NDL INSUFFLATION 13GA 120MM C2201

## (undated) DEVICE — DRAPE SHEET REV FOLD 3/4 9349

## (undated) DEVICE — CUSTOM PACK CORONARY SAN5BCRHEA

## (undated) DEVICE — SYR ANGIOGRAPHY MULTIUSE KIT ACIST 014612

## (undated) DEVICE — SYR 50ML SLIP TIP W/O NDL 309654

## (undated) DEVICE — CATH ANGIO INFINITI JR4 4FRX100CM 538421

## (undated) DEVICE — DAVINCI XI OBTURATOR BLADELESS 8MM 470359

## (undated) DEVICE — GUIDEWIRE FORTE FLOPPY J TOP 34949-05J

## (undated) DEVICE — LUBRICANT INST ELECTROLUBE EL101

## (undated) DEVICE — CATH DIAG 4FR JR 5.0 538423

## (undated) DEVICE — SU WND CLOSURE VLOC 90 3-0 15CML VLOCM0604

## (undated) DEVICE — INTRO MICRO MINI STICK 4FR STD NITINOL

## (undated) DEVICE — EXCHANGE WIRE .035 260 STAR/JFC/035/260/ M001491681

## (undated) DEVICE — CATH LAUNCHER 6FR 55CM LA6JR40K

## (undated) DEVICE — CATH ANGIO INFINITI JL3.5 4FRX100CM 538418

## (undated) DEVICE — DECANTER VIAL 2006S

## (undated) DEVICE — MANIFOLD KIT ANGIO AUTOMATED 014613

## (undated) DEVICE — SOL WATER IRRIG 1000ML BOTTLE 2F7114

## (undated) DEVICE — POSITIONING KIT THE PINK PAD XL 40X20X1IN 40583 (COI)

## (undated) DEVICE — DEVICE INFLATION W/KIT & 6IN TUBING

## (undated) DEVICE — GOWN IMPERVIOUS BREATHABLE SMART XLG 89045

## (undated) DEVICE — CUSTOM PACK LAP CHOLE SBA5BLCHEA

## (undated) DEVICE — KIT HAND CONTROL ACIST 014644 AR-P54

## (undated) DEVICE — CLOSURE ANGIOSEAL 6FR 610130

## (undated) DEVICE — GLOVE UNDER INDICATOR PI SZ 6.5 LF 41665

## (undated) DEVICE — SHTH INTRO 0.021IN ID 6FR DIA

## (undated) DEVICE — TUBE PENROSE 3/8 X 12 STRL LTX 0912020

## (undated) DEVICE — GLOVE BIOGEL PI SZ 8.0 40880

## (undated) DEVICE — PREP CHLORAPREP 26ML TINTED HI-LITE ORANGE 930815

## (undated) DEVICE — DAVINCI XI DRAPE COLUMN 470341

## (undated) DEVICE — DAVINCI XI DRAPE ARM 470015

## (undated) DEVICE — GUIDEWIRE VASCULAR 0.035IN DIA 145CML 15CML/6CML STAINLESS

## (undated) DEVICE — SHEATH ULTIMUM 6FR 12CM 407845

## (undated) DEVICE — DRSG BANDAID 1X3" FABRIC CURITY LATEX FREE KC44101

## (undated) DEVICE — CATH BALLOON EMERGE 2.0X12MM H7493918912200

## (undated) DEVICE — ELECTRODE ADULT PACING MULTI P-211-M1

## (undated) DEVICE — DEVICE CLOSURE V-LOC 0 GS-21 6IN VLOCN0306

## (undated) DEVICE — DRAPE U SPLIT 74X120" 29440

## (undated) DEVICE — SU VICRYL+ 4-0 UNDYED PS-2 VCP496ZH

## (undated) DEVICE — INTRO MICRO MINI STICK 4FR STIFF NITINOL 45-753

## (undated) DEVICE — GLOVE SURGEON PI ORTHO SZ 6-1/2 LF

## (undated) RX ORDER — DIAZEPAM 5 MG
TABLET ORAL
Status: DISPENSED
Start: 2021-07-15

## (undated) RX ORDER — DIAZEPAM 5 MG
TABLET ORAL
Status: DISPENSED
Start: 2024-01-25

## (undated) RX ORDER — FENTANYL CITRATE 50 UG/ML
INJECTION, SOLUTION INTRAMUSCULAR; INTRAVENOUS
Status: DISPENSED
Start: 2024-01-25

## (undated) RX ORDER — HEPARIN SODIUM 1000 [USP'U]/ML
INJECTION, SOLUTION INTRAVENOUS; SUBCUTANEOUS
Status: DISPENSED
Start: 2024-01-25

## (undated) RX ORDER — BUPIVACAINE HYDROCHLORIDE 2.5 MG/ML
INJECTION, SOLUTION INFILTRATION; PERINEURAL
Status: DISPENSED
Start: 2024-10-18

## (undated) RX ORDER — ONDANSETRON 2 MG/ML
INJECTION INTRAMUSCULAR; INTRAVENOUS
Status: DISPENSED
Start: 2024-10-18

## (undated) RX ORDER — FENTANYL CITRATE 50 UG/ML
INJECTION, SOLUTION INTRAMUSCULAR; INTRAVENOUS
Status: DISPENSED
Start: 2024-10-18

## (undated) RX ORDER — ASPIRIN 81 MG/1
TABLET, CHEWABLE ORAL
Status: DISPENSED
Start: 2024-01-25

## (undated) RX ORDER — EPHEDRINE SULFATE 50 MG/ML
INJECTION, SOLUTION INTRAMUSCULAR; INTRAVENOUS; SUBCUTANEOUS
Status: DISPENSED
Start: 2024-10-18

## (undated) RX ORDER — FENTANYL CITRATE 50 UG/ML
INJECTION, SOLUTION INTRAMUSCULAR; INTRAVENOUS
Status: DISPENSED
Start: 2021-07-15

## (undated) RX ORDER — LIDOCAINE HYDROCHLORIDE 10 MG/ML
INJECTION, SOLUTION EPIDURAL; INFILTRATION; INTRACAUDAL; PERINEURAL
Status: DISPENSED
Start: 2024-01-25

## (undated) RX ORDER — BUPIVACAINE HYDROCHLORIDE 2.5 MG/ML
INJECTION, SOLUTION EPIDURAL; INFILTRATION; INTRACAUDAL
Status: DISPENSED
Start: 2024-10-18